# Patient Record
Sex: FEMALE | Race: WHITE | NOT HISPANIC OR LATINO | Employment: FULL TIME | URBAN - METROPOLITAN AREA
[De-identification: names, ages, dates, MRNs, and addresses within clinical notes are randomized per-mention and may not be internally consistent; named-entity substitution may affect disease eponyms.]

---

## 2017-04-07 ENCOUNTER — HOSPITAL ENCOUNTER (OUTPATIENT)
Dept: RADIOLOGY | Facility: HOSPITAL | Age: 37
Discharge: HOME | End: 2017-04-07
Attending: FAMILY MEDICINE
Payer: COMMERCIAL

## 2017-04-07 ENCOUNTER — OFFICE VISIT (OUTPATIENT)
Dept: FAMILY MEDICINE | Facility: CLINIC | Age: 37
End: 2017-04-07
Payer: COMMERCIAL

## 2017-04-07 VITALS
HEART RATE: 77 BPM | WEIGHT: 199.5 LBS | BODY MASS INDEX: 35.35 KG/M2 | TEMPERATURE: 98 F | OXYGEN SATURATION: 98 % | DIASTOLIC BLOOD PRESSURE: 80 MMHG | SYSTOLIC BLOOD PRESSURE: 100 MMHG | HEIGHT: 63 IN

## 2017-04-07 DIAGNOSIS — Z00.00 GENERAL MEDICAL EXAMINATION: ICD-10-CM

## 2017-04-07 DIAGNOSIS — R10.9 LEFT FLANK PAIN: Primary | ICD-10-CM

## 2017-04-07 DIAGNOSIS — R10.9 LEFT FLANK PAIN: ICD-10-CM

## 2017-04-07 LAB
BILIRUB SERPL-MCNC: NORMAL MG/DL
BLOOD URINE, POC: NORMAL
COLOR, POC UA: YELLOW
GLUCOSE UR QL STRIP: NORMAL
KETONES UR QL STRIP: NORMAL
LEUKOCYTE ESTERASE URINE, POC: NORMAL
NITRITE, POC UA: NORMAL
PH, POC UA: 7
PROTEIN, POC: NORMAL
SPECIFIC GRAVITY, POC UA: 1.01
UROBILINOGEN, POC UA: NORMAL

## 2017-04-07 PROCEDURE — 87088 URINE BACTERIA CULTURE: CPT

## 2017-04-07 PROCEDURE — 74020 XR ABDOMEN FLAT AND ERECT: CPT | Mod: TC,PO

## 2017-04-07 PROCEDURE — 81002 URINALYSIS NONAUTO W/O SCOPE: CPT | Mod: S$GLB,,, | Performed by: FAMILY MEDICINE

## 2017-04-07 PROCEDURE — 99204 OFFICE O/P NEW MOD 45 MIN: CPT | Mod: 25,S$GLB,, | Performed by: FAMILY MEDICINE

## 2017-04-07 PROCEDURE — 87147 CULTURE TYPE IMMUNOLOGIC: CPT

## 2017-04-07 PROCEDURE — 1160F RVW MEDS BY RX/DR IN RCRD: CPT | Mod: S$GLB,,, | Performed by: FAMILY MEDICINE

## 2017-04-07 PROCEDURE — 87086 URINE CULTURE/COLONY COUNT: CPT

## 2017-04-07 PROCEDURE — 99999 PR PBB SHADOW E&M-EST. PATIENT-LVL III: CPT | Mod: PBBFAC,,, | Performed by: FAMILY MEDICINE

## 2017-04-07 PROCEDURE — 74020 XR ABDOMEN FLAT AND ERECT: CPT | Mod: 26,,, | Performed by: RADIOLOGY

## 2017-04-07 RX ORDER — CIPROFLOXACIN 500 MG/1
500 TABLET ORAL EVERY 12 HOURS
Qty: 20 TABLET | Refills: 0 | Status: SHIPPED | OUTPATIENT
Start: 2017-04-07 | End: 2021-08-16

## 2017-04-07 NOTE — MR AVS SNAPSHOT
[facility]  [address]  [place] [address]  Phone: [phone]  Fax: [phone]                  Dede Silver   2017 2:15 PM   Office Visit    Description:  Female : 1980   Provider:  Rosetta Ellis MD   Department:  [facility]           Reason for Visit     Back Pain           Diagnoses this Visit        Comments    Left flank pain    -  Primary     General medical examination                To Do List           Future Appointments        Provider Department Dept Phone    4/10/2017 10:00 AM LAB, [facility] [phone]    2017 2:30 PM Rosetta Ellis MD [facility] [phone]      Goals (5 Years of Data)     None       These Medications        Disp Refills Start End    ciprofloxacin HCl (CIPRO) 500 MG tablet 20 tablet 0 2017     Take 1 tablet (500 mg total) by mouth every 12 (twelve) hours. - Oral    [facility] [address] [place] [address] [place] [address] AT [facility] #: [phone]         [facility]     [facility] -  Assistance  Unless otherwise directed by your provider, please contact [facility], our nurse care line that is available for  assistance.     Registered nurses in the [facility] provide: appointment scheduling, clinical advisement, health education, and other advisory services.  Call: [phone] (toll free)               Medications           Message regarding Medications     Verify the changes and/or additions to your medication regime listed below are the same as discussed with your clinician today.  If any of these changes or additions are incorrect, please notify your healthcare provider.        START taking these NEW medications        Refills    ciprofloxacin HCl (CIPRO) 500 MG tablet 0    Sig: Take 1 tablet (500 mg total) by mouth every 12 (twelve) hours.    Class: Normal    Route: Oral          "  Verify that the below list of medications is an accurate representation of the medications you are currently taking.  If none reported, the list may be blank. If incorrect, please contact your healthcare provider. Carry this list with you in case of emergency.           Current Medications     ciprofloxacin HCl (CIPRO) 500 MG tablet Take 1 tablet (500 mg total) by mouth every 12 (twelve) hours.           Clinical Reference Information           Your Vitals Were     BP Pulse Temp Height Weight Last Period    100/80 (BP Location: Right arm, Patient Position: Sitting, BP Method: Manual) 77 98 °F (36.7 °C) (Oral) 5' 3" (1.6 m) 90.5 kg (199 lb 8.3 oz) 03/22/2017    SpO2 BMI             98% 35.34 kg/m2         Blood Pressure          Most Recent Value    BP  100/80      Allergies as of 4/7/2017     No Known Allergies      Immunizations Administered on Date of Encounter - 4/7/2017     None      Orders Placed During Today's Visit      Normal Orders This Visit    POCT URINE DIPSTICK WITHOUT MICROSCOPE     Urine culture     Future Labs/Procedures Expected by Expires    CBC auto differential  4/7/2017 4/7/2018    Comprehensive metabolic panel  4/7/2017 4/7/2018    Lipid panel  4/7/2017 4/7/2018    TSH  4/7/2017 4/7/2018    Vitamin D  4/7/2017 4/7/2018    X-Ray Abdomen Flat And Erect  4/7/2017 4/7/2018      Language Assistance Services     ATTENTION: Language assistance services are available, free of charge. Please call [phone].      ATENCIÓN: Si habla español, tiene a montez disposición servicios gratuitos de asistencia lingüística. Llame al [phone].     CHÚ Ý: N?u b?n nói Ti?ng Vi?t, có các d?ch v? h? tr? ngôn ng? mi?n phí dành cho b?n. G?i s? [phone].         [facility] complies with applicable Federal civil rights laws and does not discriminate on the basis of race, color, national origin, age, disability, or sex.        "

## 2017-04-09 LAB — BACTERIA UR CULT: NORMAL

## 2017-04-11 ENCOUNTER — LAB VISIT (OUTPATIENT)
Dept: LAB | Facility: HOSPITAL | Age: 37
End: 2017-04-11
Attending: FAMILY MEDICINE
Payer: COMMERCIAL

## 2017-04-11 DIAGNOSIS — Z00.00 GENERAL MEDICAL EXAMINATION: ICD-10-CM

## 2017-04-11 LAB
25(OH)D3+25(OH)D2 SERPL-MCNC: 26 NG/ML
ALBUMIN SERPL BCP-MCNC: 3.9 G/DL
ALP SERPL-CCNC: 61 U/L
ALT SERPL W/O P-5'-P-CCNC: 12 U/L
ANION GAP SERPL CALC-SCNC: 9 MMOL/L
AST SERPL-CCNC: 10 U/L
BASOPHILS # BLD AUTO: 0.02 K/UL
BASOPHILS NFR BLD: 0.3 %
BILIRUB SERPL-MCNC: 0.3 MG/DL
BUN SERPL-MCNC: 11 MG/DL
CALCIUM SERPL-MCNC: 9.3 MG/DL
CHLORIDE SERPL-SCNC: 104 MMOL/L
CHOLEST/HDLC SERPL: 5 {RATIO}
CO2 SERPL-SCNC: 23 MMOL/L
CREAT SERPL-MCNC: 0.8 MG/DL
DIFFERENTIAL METHOD: NORMAL
EOSINOPHIL # BLD AUTO: 0.4 K/UL
EOSINOPHIL NFR BLD: 6.5 %
ERYTHROCYTE [DISTWIDTH] IN BLOOD BY AUTOMATED COUNT: 12.8 %
EST. GFR  (AFRICAN AMERICAN): >60 ML/MIN/1.73 M^2
EST. GFR  (NON AFRICAN AMERICAN): >60 ML/MIN/1.73 M^2
GLUCOSE SERPL-MCNC: 113 MG/DL
HCT VFR BLD AUTO: 40.3 %
HDL/CHOLESTEROL RATIO: 19.9 %
HDLC SERPL-MCNC: 206 MG/DL
HDLC SERPL-MCNC: 41 MG/DL
HGB BLD-MCNC: 13.8 G/DL
LDLC SERPL CALC-MCNC: 140.2 MG/DL
LYMPHOCYTES # BLD AUTO: 1.7 K/UL
LYMPHOCYTES NFR BLD: 25.8 %
MCH RBC QN AUTO: 30.9 PG
MCHC RBC AUTO-ENTMCNC: 34.2 %
MCV RBC AUTO: 90 FL
MONOCYTES # BLD AUTO: 0.4 K/UL
MONOCYTES NFR BLD: 5.6 %
NEUTROPHILS # BLD AUTO: 4 K/UL
NEUTROPHILS NFR BLD: 61.5 %
NONHDLC SERPL-MCNC: 165 MG/DL
PLATELET # BLD AUTO: 194 K/UL
PMV BLD AUTO: 10.8 FL
POTASSIUM SERPL-SCNC: 4.3 MMOL/L
PROT SERPL-MCNC: 7.5 G/DL
RBC # BLD AUTO: 4.47 M/UL
SODIUM SERPL-SCNC: 136 MMOL/L
TRIGL SERPL-MCNC: 124 MG/DL
TSH SERPL DL<=0.005 MIU/L-ACNC: 0.86 UIU/ML
WBC # BLD AUTO: 6.44 K/UL

## 2017-04-11 PROCEDURE — 36415 COLL VENOUS BLD VENIPUNCTURE: CPT | Mod: PO

## 2017-04-11 PROCEDURE — 80061 LIPID PANEL: CPT

## 2017-04-11 PROCEDURE — 82306 VITAMIN D 25 HYDROXY: CPT

## 2017-04-11 PROCEDURE — 84443 ASSAY THYROID STIM HORMONE: CPT

## 2017-04-11 PROCEDURE — 80053 COMPREHEN METABOLIC PANEL: CPT

## 2017-04-11 PROCEDURE — 85025 COMPLETE CBC W/AUTO DIFF WBC: CPT

## 2017-04-21 ENCOUNTER — OFFICE VISIT (OUTPATIENT)
Dept: FAMILY MEDICINE | Facility: CLINIC | Age: 37
End: 2017-04-21
Payer: COMMERCIAL

## 2017-04-21 VITALS
OXYGEN SATURATION: 98 % | HEART RATE: 82 BPM | DIASTOLIC BLOOD PRESSURE: 80 MMHG | BODY MASS INDEX: 34.91 KG/M2 | SYSTOLIC BLOOD PRESSURE: 100 MMHG | HEIGHT: 63 IN | WEIGHT: 197.06 LBS | TEMPERATURE: 99 F

## 2017-04-21 DIAGNOSIS — E55.9 VITAMIN D DEFICIENCY: ICD-10-CM

## 2017-04-21 DIAGNOSIS — N39.0 URINARY TRACT INFECTION WITHOUT HEMATURIA, SITE UNSPECIFIED: ICD-10-CM

## 2017-04-21 DIAGNOSIS — Z00.00 ANNUAL PHYSICAL EXAM: Primary | ICD-10-CM

## 2017-04-21 DIAGNOSIS — R10.9 LEFT FLANK PAIN: ICD-10-CM

## 2017-04-21 PROCEDURE — 96372 THER/PROPH/DIAG INJ SC/IM: CPT | Mod: S$GLB,,, | Performed by: FAMILY MEDICINE

## 2017-04-21 PROCEDURE — 99999 PR PBB SHADOW E&M-EST. PATIENT-LVL IV: CPT | Mod: PBBFAC,,, | Performed by: FAMILY MEDICINE

## 2017-04-21 PROCEDURE — 87086 URINE CULTURE/COLONY COUNT: CPT

## 2017-04-21 PROCEDURE — 99395 PREV VISIT EST AGE 18-39: CPT | Mod: 25,S$GLB,, | Performed by: FAMILY MEDICINE

## 2017-04-21 RX ORDER — ERGOCALCIFEROL 1.25 MG/1
50000 CAPSULE ORAL
Qty: 4 CAPSULE | Refills: 2 | Status: SHIPPED | OUTPATIENT
Start: 2017-04-21 | End: 2017-05-21

## 2017-04-21 RX ORDER — PENICILLIN V POTASSIUM 500 MG/1
500 TABLET, FILM COATED ORAL EVERY 8 HOURS
Qty: 15 TABLET | Refills: 0 | Status: SHIPPED | OUTPATIENT
Start: 2017-04-21 | End: 2017-04-26

## 2017-04-21 NOTE — PATIENT INSTRUCTIONS
Understanding Urinary Tract Infections (UTIs)  Most UTIs are caused by bacteria, although they may also be caused by viruses or fungi. Bacteria from the bowel are the most common source of infection. The infection may begin because of any of the following:  · Sexual activity. During sex, germs can travel from the penis, vagina, or rectum into the urethra.   · Germs on the skin outside the rectum may travel into the urethra. This is more common in women since the rectum and urethra are closer to each other than in men. Wiping from front to back after using the toilet and keeping the area clean can help prevent germs from getting to the urethra.  · Blockage of urine flow through the urinary tract. If urine sits too long, germs may begin to grow out of control.      Parts of the urinary tract  The infection can occur in any part of the urinary tract.  · The kidneys collect and store urine.  · The ureters carry urine from the kidneys to the bladder.  · The bladder holds urine until you are ready to let it out.  · The urethra carries urine from the bladder out of the body. It is shorter in women, so bacteria can move through it more easily. The urethra is longer in men, so a UTI is less likely to reach the bladder or kidneys in men.  Date Last Reviewed: 9/8/2014  © 8268-3988 [employer]. [address] [place] [address], [place] [address]. All rights reserved. This information is not intended as a substitute for professional medical care. Always follow your healthcare professional's instructions.

## 2017-04-21 NOTE — PROGRESS NOTES
Patient tolerated injection well.  Instructed to remain in lobby for 15 minutes and to report any adverse reactions right away.   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

## 2017-04-21 NOTE — MR AVS SNAPSHOT
[facility]  [address] [facility]  [place] [address]  Phone: [phone]  Fax: [phone]                  Dede Silver   2017 2:30 PM   Office Visit    Description:  Female : 1980   Provider:  Rosetta Ellis MD   Department:  [facility]           Reason for Visit     Results     Annual Exam           Diagnoses this Visit        Comments    Left flank pain    -  Primary     Urinary tract infection without hematuria, site unspecified                To Do List           Goals (5 Years of Data)     None       These Medications        Disp Refills Start End    ergocalciferol (ERGOCALCIFEROL) 50,000 unit Cap 4 capsule 2 2017    Take 1 capsule (50,000 Units total) by mouth every 7 days. - Oral    Pharmacy: [facility] [address] [place] [address] AT [facility] #: [phone]       penicillin v potassium (VEETID) 500 MG tablet 15 tablet 0 2017    Take 1 tablet (500 mg total) by mouth every 8 (eight) hours. - Oral    Pharmacy: [facility] [address] [place] [address] AT [facility] #: [phone]         [facility]     [facility] - 24/ Assistance  Unless otherwise directed by your provider, please contact [facility], our nurse care line that is available for 24/ assistance.     Registered nurses in the [facility] provide: appointment scheduling, clinical advisement, health education, and other advisory services.  Call: [phone] (toll free)               Medications           Message regarding Medications     Verify the changes and/or additions to your medication regime listed below are the same as discussed with your clinician today.  If any of these changes or additions are incorrect, please notify your healthcare provider.        START taking these NEW medications        Refills    ergocalciferol (ERGOCALCIFEROL) 50,000 unit  "Cap 2    Sig: Take 1 capsule (50,000 Units total) by mouth every 7 days.    Class: Normal    Route: Oral    penicillin v potassium (VEETID) 500 MG tablet 0    Sig: Take 1 tablet (500 mg total) by mouth every 8 (eight) hours.    Class: Normal    Route: Oral      These medications were administered today        Dose Freq    penicillin G benzathine (BICILLIN LA) injection 1.2 Million Units 1.2 Million Units Clinic/HOD 1 time    Sig: Inject 2 mLs (1.2 Million Units total) into the muscle one time.    Class: Normal    Route: Intramuscular           Verify that the below list of medications is an accurate representation of the medications you are currently taking.  If none reported, the list may be blank. If incorrect, please contact your healthcare provider. Carry this list with you in case of emergency.           Current Medications     ciprofloxacin HCl (CIPRO) 500 MG tablet Take 1 tablet (500 mg total) by mouth every 12 (twelve) hours.    ergocalciferol (ERGOCALCIFEROL) 50,000 unit Cap Take 1 capsule (50,000 Units total) by mouth every 7 days.    penicillin v potassium (VEETID) 500 MG tablet Take 1 tablet (500 mg total) by mouth every 8 (eight) hours.           Clinical Reference Information           Your Vitals Were     BP Pulse Temp Height Weight Last Period    100/80 (BP Location: Right arm, Patient Position: Sitting, BP Method: Manual) 82 98.5 °F (36.9 °C) (Oral) 5' 3" (1.6 m) 89.4 kg (197 lb 1.5 oz) 03/22/2017    SpO2 BMI             98% 34.91 kg/m2         Blood Pressure          Most Recent Value    BP  100/80      Allergies as of 4/21/2017     No Known Allergies      Immunizations Administered on Date of Encounter - 4/21/2017     None      Orders Placed During Today's Visit      Normal Orders This Visit    CULTURE, URINE     Future Labs/Procedures Expected by Expires    US Retroperitoneal Complete (Kidney and  4/21/2017 4/21/2018      Instructions      Understanding Urinary Tract Infections (UTIs)  Most UTIs " are caused by bacteria, although they may also be caused by viruses or fungi. Bacteria from the bowel are the most common source of infection. The infection may begin because of any of the following:  · Sexual activity. During sex, germs can travel from the penis, vagina, or rectum into the urethra.   · Germs on the skin outside the rectum may travel into the urethra. This is more common in women since the rectum and urethra are closer to each other than in men. Wiping from front to back after using the toilet and keeping the area clean can help prevent germs from getting to the urethra.  · Blockage of urine flow through the urinary tract. If urine sits too long, germs may begin to grow out of control.      Parts of the urinary tract  The infection can occur in any part of the urinary tract.  · The kidneys collect and store urine.  · The ureters carry urine from the kidneys to the bladder.  · The bladder holds urine until you are ready to let it out.  · The urethra carries urine from the bladder out of the body. It is shorter in women, so bacteria can move through it more easily. The urethra is longer in men, so a UTI is less likely to reach the bladder or kidneys in men.  Date Last Reviewed: 9/8/2014  © 9261-2967 [employer]. [address] [place] [address], [place] [address]. All rights reserved. This information is not intended as a substitute for professional medical care. Always follow your healthcare professional's instructions.             Language Assistance Services     ATTENTION: Language assistance services are available, free of charge. Please call [phone].      ATENCIÓN: Si habla español, tiene a montez disposición servicios gratuitos de asistencia lingüística. Llame al [phone].     [facility] Ý: N?u b?n nói Ti?ng Vi?t, có các d?ch v? h? tr? ngôn ng? mi?n phí dành cho b?n. G?i s? [phone].         [facility] complies with applicable Federal civil rights laws and does not  discriminate on the basis of race, color, national origin, age, disability, or sex.

## 2017-04-22 LAB — BACTERIA UR CULT: NO GROWTH

## 2017-04-23 NOTE — PROGRESS NOTES
Routine Office Visit    Patient Name: Dede Silver    : 1980  MRN: [id number]    Subjective:  Dede is a 36 y.o. female who presents today for     1. New patient / establish care   2. Left flank pain - started 2 weeks ago - pain occurs in her left flank whenever she urinates. She states that whenever she urinates, she feels that she has an uncomfortable sensation and painful sensation in her left flank pain. 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 She prescribed herself bactrim x 3 days and completed course of antibiotic. She has increase her fluid intake but symptoms have not improved.     Review of Systems   Constitutional: Negative for chills and fever.   HENT: Negative for congestion.    Eyes: Negative for blurred vision.   Respiratory: Negative for cough.    Cardiovascular: Negative for chest pain.   Gastrointestinal: Negative for abdominal pain, constipation, diarrhea, heartburn, nausea and vomiting.   Genitourinary: Negative for dysuria.   Musculoskeletal: Negative for myalgias.   Skin: Negative for itching and rash.   Neurological: Negative for dizziness and headaches.   Psychiatric/Behavioral: Negative for depression.       Active Problem List  There is no problem list on file for this patient.      Past Surgical History  History reviewed. No pertinent surgical history.    Family History  History reviewed. No pertinent family history.    Social History  Social History     Social History    Marital status:      Spouse name: N/A    Number of children: N/A    Years of education: N/A     Occupational History    Not on file.     Social History Main Topics    Smoking status: Never Smoker    Smokeless tobacco: Not on file    Alcohol use No    Drug use: Not on file    Sexual activity: Not on file     Other Topics Concern    Not on file     Social History Narrative       Medications and Allergies  Reviewed and updated.   Current Outpatient Prescriptions   Medication Sig     "ciprofloxacin HCl (CIPRO) 500 MG tablet Take 1 tablet (500 mg total) by mouth every 12 (twelve) hours.    ergocalciferol (ERGOCALCIFEROL) 50,000 unit Cap Take 1 capsule (50,000 Units total) by mouth every 7 days.    penicillin v potassium (VEETID) 500 MG tablet Take 1 tablet (500 mg total) by mouth every 8 (eight) hours.     No current facility-administered medications for this visit.        Physical Exam  /80 (BP Location: Right arm, Patient Position: Sitting, BP Method: Manual)  Pulse 77  Temp 98 °F (36.7 °C) (Oral)   Ht 5' 3" (1.6 m)  Wt 90.5 kg (199 lb 8.3 oz)  LMP 03/22/2017  SpO2 98%  BMI 35.34 kg/m2  Physical Exam   Constitutional: She is oriented to person, place, and time. She appears well-developed and well-nourished.   HENT:   Head: Normocephalic and atraumatic.   Eyes: Conjunctivae and EOM are normal. Pupils are equal, round, and reactive to light.   Neck: Normal range of motion. Neck supple.   Cardiovascular: Normal rate, regular rhythm and normal heart sounds.  Exam reveals no gallop and no friction rub.    No murmur heard.  Pulmonary/Chest: Breath sounds normal. No respiratory distress.   Abdominal: Soft. Bowel sounds are normal. She exhibits no distension. There is no tenderness.   Musculoskeletal: Normal range of motion.   Lymphadenopathy:     She has no cervical adenopathy.   Neurological: She is alert and oriented to person, place, and time.   Skin: Skin is warm.   Psychiatric: She has a normal mood and affect.         Assessment/Plan:  Dede Silver is a 36 y.o. female who presents today for :    Left flank pain  -     POCT URINE DIPSTICK WITHOUT MICROSCOPE  -     Urine culture  -     X-Ray Abdomen Flat And Erect; Future; Expected date: 4/7/17  -     ciprofloxacin HCl (CIPRO) 500 MG tablet; Take 1 tablet (500 mg total) by mouth every 12 (twelve) hours.  Dispense: 20 tablet; Refill: 0    General medical examination  -     CBC auto differential; Future; Expected date: 4/7/17  -     " Comprehensive metabolic panel; Future; Expected date: 4/7/17  -     Lipid panel; Future; Expected date: 4/7/17  -     Vitamin D; Future; Expected date: 4/7/17  -     TSH; Future; Expected date: 4/7/17        Return if symptoms worsen or fail to improve.

## 2017-04-23 NOTE — PROGRESS NOTES
Routine Office Visit    Patient Name: Dede Silver    : 1980  MRN: [id number]    Subjective:  Dede is a 36 y.o. female who presents today for     1. Annual visit   2. Left flank pain - still present since last visit. Pt states symptoms started 5 weeks ago. 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 She states that she only feels the pain whenever she urinates. She says it feels like the urine is refluxing up.     Review of Systems   Constitutional: Negative for chills and fever.   HENT: Negative for congestion.    Eyes: Negative for blurred vision.   Respiratory: Negative for cough.    Cardiovascular: Negative for chest pain.   Gastrointestinal: Negative for abdominal pain, constipation, diarrhea, heartburn, nausea and vomiting.   Genitourinary: Negative for dysuria.   Musculoskeletal: Negative for myalgias.   Skin: Negative for itching and rash.   Neurological: Negative for dizziness and headaches.   Psychiatric/Behavioral: Negative for depression.       Active Problem List  There is no problem list on file for this patient.      Past Surgical History  History reviewed. No pertinent surgical history.    Family History  History reviewed. No pertinent family history.    Social History  Social History     Social History    Marital status:      Spouse name: N/A    Number of children: N/A    Years of education: N/A     Occupational History    Not on file.     Social History Main Topics    Smoking status: Never Smoker    Smokeless tobacco: Not on file    Alcohol use No    Drug use: Not on file    Sexual activity: Not on file     Other Topics Concern    Not on file     Social History Narrative       Medications and Allergies  Reviewed and updated.   Current Outpatient Prescriptions   Medication Sig    ciprofloxacin HCl (CIPRO) 500 MG tablet Take 1 tablet (500 mg total) by mouth every 12 (twelve) hours.    ergocalciferol  "(ERGOCALCIFEROL) 50,000 unit Cap Take 1 capsule (50,000 Units total) by mouth every 7 days.    penicillin v potassium (VEETID) 500 MG tablet Take 1 tablet (500 mg total) by mouth every 8 (eight) hours.     No current facility-administered medications for this visit.        Physical Exam  /80 (BP Location: Right arm, Patient Position: Sitting, BP Method: Manual)  Pulse 82  Temp 98.5 °F (36.9 °C) (Oral)   Ht 5' 3" (1.6 m)  Wt 89.4 kg (197 lb 1.5 oz)  LMP 03/22/2017  SpO2 98%  BMI 34.91 kg/m2  Physical Exam   Constitutional: She is oriented to person, place, and time. She appears well-developed and well-nourished.   HENT:   Head: Normocephalic and atraumatic.   Eyes: Conjunctivae and EOM are normal. Pupils are equal, round, and reactive to light.   Neck: Normal range of motion. Neck supple.   Cardiovascular: Normal rate, regular rhythm and normal heart sounds.  Exam reveals no gallop and no friction rub.    No murmur heard.  Pulmonary/Chest: Breath sounds normal. No respiratory distress.   Abdominal: Soft. Bowel sounds are normal. She exhibits no distension. There is no tenderness.   Musculoskeletal: Normal range of motion.   Lymphadenopathy:     She has no cervical adenopathy.   Neurological: She is alert and oriented to person, place, and time.   Skin: Skin is warm.   Psychiatric: She has a normal mood and affect.         Assessment/Plan:  Dede Silver is a 36 y.o. female who presents today for :    Annual physical exam  I reviewed labs with patient     Left flank pain  -     US Retroperitoneal Complete (Kidney and; Future; Expected date: 4/21/17  -     CULTURE, URINE  -     Ambulatory referral to Urology    Urinary tract infection without hematuria, site unspecified  -     penicillin G benzathine (BICILLIN LA) injection 1.2 Million Units; Inject 2 mLs (1.2 Million Units total) into the muscle one time.  -     penicillin v potassium (VEETID) 500 MG tablet; Take 1 tablet (500 mg total) by mouth every 8 " (eight) hours.  Dispense: 15 tablet; Refill: 0  Previous culture showed strep agalactiae - pt still with pain - will treat   Will repeat culture      Vitamin D deficiency  -     ergocalciferol (ERGOCALCIFEROL) 50,000 unit Cap; Take 1 capsule (50,000 Units total) by mouth every 7 days.  Dispense: 4 capsule; Refill: 2        Return if symptoms worsen or fail to improve.

## 2017-04-25 ENCOUNTER — TELEPHONE (OUTPATIENT)
Dept: FAMILY MEDICINE | Facility: CLINIC | Age: 37
End: 2017-04-25

## 2017-04-25 NOTE — TELEPHONE ENCOUNTER
Patient has an appointment 5/9/17 1:30pm with Dr. Haji at the [facility], patient was notified.

## 2018-07-16 ENCOUNTER — OFFICE VISIT (OUTPATIENT)
Dept: DERMATOLOGY | Facility: CLINIC | Age: 38
End: 2018-07-16
Payer: COMMERCIAL

## 2018-07-16 DIAGNOSIS — D18.00 ANGIOMA: Primary | ICD-10-CM

## 2018-07-16 PROCEDURE — 99999 PR PBB SHADOW E&M-EST. PATIENT-LVL II: CPT | Mod: PBBFAC,,, | Performed by: DERMATOLOGY

## 2018-07-16 PROCEDURE — 99201 PR OFFICE/OUTPT VISIT,NEW,LEVL I: CPT | Mod: S$GLB,,, | Performed by: DERMATOLOGY

## 2018-07-16 NOTE — PROGRESS NOTES
Subjective:       Patient ID:  Dede Silver is a 37 y.o. female who presents for   Chief Complaint   Patient presents with    Mole     neck      Mole  - Initial  Affected locations: neck  Duration: 1 year  Signs / symptoms: growing  Aggravated by: nothing  Relieving factors/Treatments tried: nothing        Review of Systems   Hematologic/Lymphatic: Does not bruise/bleed easily.        Objective:    Physical Exam   Constitutional: She appears well-developed and well-nourished. No distress.   Neurological: She is alert and oriented to person, place, and time. She is not disoriented.   Psychiatric: She has a normal mood and affect.   Skin:   Areas Examined (abnormalities noted in diagram):   Head / Face Inspection Performed  Neck Inspection Performed              Diagram Legend     Erythematous scaling macule/papule c/w actinic keratosis       Vascular papule c/w angioma      Pigmented verrucoid papule/plaque c/w seborrheic keratosis      Yellow umbilicated papule c/w sebaceous hyperplasia      Irregularly shaped tan macule c/w lentigo     1-2 mm smooth white papules consistent with Milia      Movable subcutaneous cyst with punctum c/w epidermal inclusion cyst      Subcutaneous movable cyst c/w pilar cyst      Firm pink to brown papule c/w dermatofibroma      Pedunculated fleshy papule(s) c/w skin tag(s)      Evenly pigmented macule c/w junctional nevus     Mildly variegated pigmented, slightly irregular-bordered macule c/w mildly atypical nevus      Flesh colored to evenly pigmented papule c/w intradermal nevus       Pink pearly papule/plaque c/w basal cell carcinoma      Erythematous hyperkeratotic cursted plaque c/w SCC      Surgical scar with no sign of skin cancer recurrence      Open and closed comedones      Inflammatory papules and pustules      Verrucoid papule consistent consistent with wart     Erythematous eczematous patches and plaques     Dystrophic onycholytic nail with subungual debris c/w  onychomycosis     Umbilicated papule    Erythematous-base heme-crusted tan verrucoid plaque consistent with inflamed seborrheic keratosis     Erythematous Silvery Scaling Plaque c/w Psoriasis     See annotation      Assessment / Plan:        Angioma  Reassurance given to patient. No treatment is necessary.   Treatment of benign, asymptomatic lesions may be considered cosmetic.    Discussed ABCDE's of nevi.  Monitor for new mole or moles that are becoming bigger, darker, irritated, or developing irregular borders.                Follow-up if symptoms worsen or fail to improve.

## 2021-08-06 ENCOUNTER — TELEPHONE (OUTPATIENT)
Dept: NEUROLOGY | Facility: CLINIC | Age: 41
End: 2021-08-06

## 2021-08-13 ENCOUNTER — TELEPHONE (OUTPATIENT)
Dept: NEUROLOGY | Facility: CLINIC | Age: 41
End: 2021-08-13

## 2021-08-16 ENCOUNTER — DOCUMENTATION ONLY (OUTPATIENT)
Dept: NEUROLOGY | Facility: CLINIC | Age: 41
End: 2021-08-16

## 2021-08-16 ENCOUNTER — OFFICE VISIT (OUTPATIENT)
Dept: NEUROLOGY | Facility: CLINIC | Age: 41
End: 2021-08-16
Payer: COMMERCIAL

## 2021-08-16 VITALS
WEIGHT: 227.19 LBS | HEART RATE: 75 BPM | DIASTOLIC BLOOD PRESSURE: 90 MMHG | BODY MASS INDEX: 40.25 KG/M2 | SYSTOLIC BLOOD PRESSURE: 129 MMHG | HEIGHT: 63 IN

## 2021-08-16 DIAGNOSIS — D47.2 MGUS (MONOCLONAL GAMMOPATHY OF UNKNOWN SIGNIFICANCE): ICD-10-CM

## 2021-08-16 DIAGNOSIS — M25.60 MORNING STIFFNESS OF JOINTS: ICD-10-CM

## 2021-08-16 DIAGNOSIS — G62.9 POLYNEUROPATHY: Primary | ICD-10-CM

## 2021-08-16 PROCEDURE — 99205 OFFICE O/P NEW HI 60 MIN: CPT | Mod: S$PBB,,, | Performed by: STUDENT IN AN ORGANIZED HEALTH CARE EDUCATION/TRAINING PROGRAM

## 2021-08-16 PROCEDURE — 99205 PR OFFICE/OUTPT VISIT, NEW, LEVL V, 60-74 MIN: ICD-10-PCS | Mod: S$PBB,,, | Performed by: STUDENT IN AN ORGANIZED HEALTH CARE EDUCATION/TRAINING PROGRAM

## 2021-08-16 PROCEDURE — 99999 PR PBB SHADOW E&M-EST. PATIENT-LVL III: ICD-10-PCS | Mod: PBBFAC,,, | Performed by: STUDENT IN AN ORGANIZED HEALTH CARE EDUCATION/TRAINING PROGRAM

## 2021-08-16 PROCEDURE — 99999 PR PBB SHADOW E&M-EST. PATIENT-LVL III: CPT | Mod: PBBFAC,,, | Performed by: STUDENT IN AN ORGANIZED HEALTH CARE EDUCATION/TRAINING PROGRAM

## 2021-08-16 RX ORDER — IBUPROFEN 200 MG
200 TABLET ORAL
COMMUNITY
End: 2021-10-11 | Stop reason: ALTCHOICE

## 2021-08-16 RX ORDER — ASCORBIC ACID, CHOLECALCIFEROL, DL-.ALPHA.-TOCOPHEROL ACETATE, THIAMINE HYDROCHLORIDE, RIBOFLAVIN, NIACINAMIDE, PYRIDOXINE HYDROCHLORIDE, FOLIC ACID, CYANOCOBALAMIN, CALCIUM CARBONATE, FERROUS FUMARATE, ZINC OXIDE, CUPRIC SULFATE 100; 400; 30; 1.6; 1.6; 20; 3.1; 1; 12; 200; 27; 10; 2 MG/1; [IU]/1; [IU]/1; MG/1; MG/1; MG/1; MG/1; MG/1; UG/1; MG/1; MG/1; MG/1; MG/1
1 TABLET, COATED ORAL DAILY
COMMUNITY
Start: 2021-03-30 | End: 2022-08-30

## 2021-08-20 ENCOUNTER — PATIENT MESSAGE (OUTPATIENT)
Dept: NEUROLOGY | Facility: CLINIC | Age: 41
End: 2021-08-20

## 2021-08-26 ENCOUNTER — TELEPHONE (OUTPATIENT)
Dept: NEUROLOGY | Facility: CLINIC | Age: 41
End: 2021-08-26

## 2021-08-26 NOTE — TELEPHONE ENCOUNTER
----- Message from Roseanna Garza sent at 8/26/2021 10:03 AM CDT -----  Contact: self @ [phone]  Pt says she is returning Brunilda's call concerning an appt.

## 2021-09-08 ENCOUNTER — TELEPHONE (OUTPATIENT)
Dept: RHEUMATOLOGY | Facility: CLINIC | Age: 41
End: 2021-09-08

## 2021-09-14 ENCOUNTER — TELEPHONE (OUTPATIENT)
Dept: NEUROLOGY | Facility: CLINIC | Age: 41
End: 2021-09-14

## 2021-09-27 ENCOUNTER — PROCEDURE VISIT (OUTPATIENT)
Dept: NEUROLOGY | Facility: CLINIC | Age: 41
End: 2021-09-27
Payer: COMMERCIAL

## 2021-09-27 ENCOUNTER — LAB VISIT (OUTPATIENT)
Dept: LAB | Facility: OTHER | Age: 41
End: 2021-09-27
Attending: STUDENT IN AN ORGANIZED HEALTH CARE EDUCATION/TRAINING PROGRAM
Payer: COMMERCIAL

## 2021-09-27 DIAGNOSIS — G62.9 POLYNEUROPATHY: ICD-10-CM

## 2021-09-27 DIAGNOSIS — D47.2 MGUS (MONOCLONAL GAMMOPATHY OF UNKNOWN SIGNIFICANCE): ICD-10-CM

## 2021-09-27 LAB
CCP AB SER IA-ACNC: <0.5 U/ML
ESTIMATED AVG GLUCOSE: 111 MG/DL (ref 68–131)
HBA1C MFR BLD: 5.5 % (ref 4–5.6)
RHEUMATOID FACT SERPL-ACNC: <13 IU/ML (ref 0–15)

## 2021-09-27 PROCEDURE — 86431 RHEUMATOID FACTOR QUANT: CPT | Performed by: STUDENT IN AN ORGANIZED HEALTH CARE EDUCATION/TRAINING PROGRAM

## 2021-09-27 PROCEDURE — 86225 DNA ANTIBODY NATIVE: CPT | Performed by: STUDENT IN AN ORGANIZED HEALTH CARE EDUCATION/TRAINING PROGRAM

## 2021-09-27 PROCEDURE — 95913 PR NERVE CONDUCTION STUDY; 13 OR MORE STUDIES: ICD-10-PCS | Mod: S$GLB,,, | Performed by: PSYCHIATRY & NEUROLOGY

## 2021-09-27 PROCEDURE — 86618 LYME DISEASE ANTIBODY: CPT | Performed by: STUDENT IN AN ORGANIZED HEALTH CARE EDUCATION/TRAINING PROGRAM

## 2021-09-27 PROCEDURE — 86147 CARDIOLIPIN ANTIBODY EA IG: CPT | Performed by: STUDENT IN AN ORGANIZED HEALTH CARE EDUCATION/TRAINING PROGRAM

## 2021-09-27 PROCEDURE — 86235 NUCLEAR ANTIGEN ANTIBODY: CPT | Mod: 59 | Performed by: STUDENT IN AN ORGANIZED HEALTH CARE EDUCATION/TRAINING PROGRAM

## 2021-09-27 PROCEDURE — 86592 SYPHILIS TEST NON-TREP QUAL: CPT | Performed by: STUDENT IN AN ORGANIZED HEALTH CARE EDUCATION/TRAINING PROGRAM

## 2021-09-27 PROCEDURE — 84425 ASSAY OF VITAMIN B-1: CPT | Performed by: STUDENT IN AN ORGANIZED HEALTH CARE EDUCATION/TRAINING PROGRAM

## 2021-09-27 PROCEDURE — 86706 HEP B SURFACE ANTIBODY: CPT | Performed by: STUDENT IN AN ORGANIZED HEALTH CARE EDUCATION/TRAINING PROGRAM

## 2021-09-27 PROCEDURE — 95913 NRV CNDJ TEST 13/> STUDIES: CPT | Mod: S$GLB,,, | Performed by: PSYCHIATRY & NEUROLOGY

## 2021-09-27 PROCEDURE — 86803 HEPATITIS C AB TEST: CPT | Performed by: STUDENT IN AN ORGANIZED HEALTH CARE EDUCATION/TRAINING PROGRAM

## 2021-09-27 PROCEDURE — 95886 PR EMG COMPLETE, W/ NERVE CONDUCTION STUDIES, 5+ MUSCLES: ICD-10-PCS | Mod: S$GLB,,, | Performed by: PSYCHIATRY & NEUROLOGY

## 2021-09-27 PROCEDURE — 86705 HEP B CORE ANTIBODY IGM: CPT | Performed by: STUDENT IN AN ORGANIZED HEALTH CARE EDUCATION/TRAINING PROGRAM

## 2021-09-27 PROCEDURE — 87389 HIV-1 AG W/HIV-1&-2 AB AG IA: CPT | Performed by: STUDENT IN AN ORGANIZED HEALTH CARE EDUCATION/TRAINING PROGRAM

## 2021-09-27 PROCEDURE — 83036 HEMOGLOBIN GLYCOSYLATED A1C: CPT | Performed by: STUDENT IN AN ORGANIZED HEALTH CARE EDUCATION/TRAINING PROGRAM

## 2021-09-27 PROCEDURE — 82300 ASSAY OF CADMIUM: CPT | Performed by: STUDENT IN AN ORGANIZED HEALTH CARE EDUCATION/TRAINING PROGRAM

## 2021-09-27 PROCEDURE — 86200 CCP ANTIBODY: CPT | Performed by: STUDENT IN AN ORGANIZED HEALTH CARE EDUCATION/TRAINING PROGRAM

## 2021-09-27 PROCEDURE — 86235 NUCLEAR ANTIGEN ANTIBODY: CPT | Performed by: STUDENT IN AN ORGANIZED HEALTH CARE EDUCATION/TRAINING PROGRAM

## 2021-09-27 PROCEDURE — 86038 ANTINUCLEAR ANTIBODIES: CPT | Performed by: STUDENT IN AN ORGANIZED HEALTH CARE EDUCATION/TRAINING PROGRAM

## 2021-09-27 PROCEDURE — 95886 MUSC TEST DONE W/N TEST COMP: CPT | Mod: S$GLB,,, | Performed by: PSYCHIATRY & NEUROLOGY

## 2021-09-28 ENCOUNTER — TELEPHONE (OUTPATIENT)
Dept: RHEUMATOLOGY | Facility: CLINIC | Age: 41
End: 2021-09-28

## 2021-09-28 ENCOUNTER — PATIENT MESSAGE (OUTPATIENT)
Dept: RHEUMATOLOGY | Facility: CLINIC | Age: 41
End: 2021-09-28

## 2021-09-28 LAB
ANA SER QL IF: NORMAL
DSDNA AB SER-ACNC: NORMAL [IU]/ML
HBV CORE IGM SERPL QL IA: NEGATIVE
HBV SURFACE AB SER-ACNC: NEGATIVE M[IU]/ML
HCV AB SERPL QL IA: NEGATIVE
HIV 1+2 AB+HIV1 P24 AG SERPL QL IA: NEGATIVE
RPR SER QL: NORMAL

## 2021-09-29 LAB
ANTI-SSA ANTIBODY: 0.04 RATIO (ref 0–0.99)
ANTI-SSA INTERPRETATION: NEGATIVE
ANTI-SSB ANTIBODY: 0.04 RATIO (ref 0–0.99)
ANTI-SSB INTERPRETATION: NEGATIVE
ARSENIC BLD-MCNC: 1 NG/ML
CADMIUM BLD-MCNC: <0.2 NG/ML
CITY: NORMAL
COUNTY: NORMAL
GUARDIAN FIRST NAME: NORMAL
GUARDIAN LAST NAME: NORMAL
HOME PHONE: NORMAL
LEAD BLD-MCNC: 1.2 MCG/DL
MERCURY BLD-MCNC: 2 NG/ML
RACE: NORMAL
STATE: NORMAL
STREET ADDRESS: NORMAL
VENOUS/CAPILLARY: NORMAL
ZIP: NORMAL

## 2021-09-30 LAB
B BURGDOR AB SER IA-ACNC: 0.08 INDEX VALUE
CARDIOLIPIN IGG SER IA-ACNC: <9.4 GPL (ref 0–14.99)
CARDIOLIPIN IGM SER IA-ACNC: 12.3 MPL (ref 0–12.49)

## 2021-10-01 PROBLEM — G62.9 PERIPHERAL NEUROPATHY: Status: ACTIVE | Noted: 2021-10-01

## 2021-10-01 LAB — VIT B1 BLD-MCNC: 70 UG/L (ref 38–122)

## 2021-10-04 ENCOUNTER — IMMUNIZATION (OUTPATIENT)
Dept: OBSTETRICS AND GYNECOLOGY | Facility: CLINIC | Age: 41
End: 2021-10-04
Payer: COMMERCIAL

## 2021-10-04 DIAGNOSIS — Z23 NEED FOR VACCINATION: Primary | ICD-10-CM

## 2021-10-04 PROCEDURE — 0001A COVID-19, MRNA, LNP-S, PF, 30 MCG/0.3 ML DOSE VACCINE: CPT | Mod: PBBFAC | Performed by: FAMILY MEDICINE

## 2021-10-06 ENCOUNTER — TELEPHONE (OUTPATIENT)
Dept: RHEUMATOLOGY | Facility: CLINIC | Age: 41
End: 2021-10-06

## 2021-10-11 ENCOUNTER — OFFICE VISIT (OUTPATIENT)
Dept: RHEUMATOLOGY | Facility: CLINIC | Age: 41
End: 2021-10-11
Payer: COMMERCIAL

## 2021-10-11 ENCOUNTER — HOSPITAL ENCOUNTER (OUTPATIENT)
Dept: RADIOLOGY | Facility: HOSPITAL | Age: 41
Discharge: HOME | End: 2021-10-11
Attending: INTERNAL MEDICINE
Payer: COMMERCIAL

## 2021-10-11 VITALS
BODY MASS INDEX: 40.46 KG/M2 | DIASTOLIC BLOOD PRESSURE: 88 MMHG | SYSTOLIC BLOOD PRESSURE: 121 MMHG | HEIGHT: 63 IN | HEART RATE: 73 BPM | WEIGHT: 228.38 LBS

## 2021-10-11 DIAGNOSIS — M25.50 POLYARTHRALGIA: Primary | ICD-10-CM

## 2021-10-11 DIAGNOSIS — G60.9 IDIOPATHIC PERIPHERAL NEUROPATHY: ICD-10-CM

## 2021-10-11 DIAGNOSIS — M25.50 POLYARTHRALGIA: ICD-10-CM

## 2021-10-11 PROCEDURE — 1160F PR REVIEW ALL MEDS BY PRESCRIBER/CLIN PHARMACIST DOCUMENTED: ICD-10-PCS | Mod: CPTII,S$GLB,, | Performed by: INTERNAL MEDICINE

## 2021-10-11 PROCEDURE — 73130 X-RAY EXAM OF HAND: CPT | Mod: TC,50

## 2021-10-11 PROCEDURE — 3079F PR MOST RECENT DIASTOLIC BLOOD PRESSURE 80-89 MM HG: ICD-10-PCS | Mod: CPTII,S$GLB,, | Performed by: INTERNAL MEDICINE

## 2021-10-11 PROCEDURE — 3074F PR MOST RECENT SYSTOLIC BLOOD PRESSURE < 130 MM HG: ICD-10-PCS | Mod: CPTII,S$GLB,, | Performed by: INTERNAL MEDICINE

## 2021-10-11 PROCEDURE — 99203 PR OFFICE/OUTPT VISIT, NEW, LEVL III, 30-44 MIN: ICD-10-PCS | Mod: S$GLB,,, | Performed by: INTERNAL MEDICINE

## 2021-10-11 PROCEDURE — 99203 OFFICE O/P NEW LOW 30 MIN: CPT | Mod: S$GLB,,, | Performed by: INTERNAL MEDICINE

## 2021-10-11 PROCEDURE — 3044F HG A1C LEVEL LT 7.0%: CPT | Mod: CPTII,S$GLB,, | Performed by: INTERNAL MEDICINE

## 2021-10-11 PROCEDURE — 99999 PR PBB SHADOW E&M-EST. PATIENT-LVL III: CPT | Mod: PBBFAC,,, | Performed by: INTERNAL MEDICINE

## 2021-10-11 PROCEDURE — 3008F PR BODY MASS INDEX (BMI) DOCUMENTED: ICD-10-PCS | Mod: CPTII,S$GLB,, | Performed by: INTERNAL MEDICINE

## 2021-10-11 PROCEDURE — 73130 X-RAY EXAM OF HAND: CPT | Mod: 26,LT,, | Performed by: RADIOLOGY

## 2021-10-11 PROCEDURE — 73130 X-RAY EXAM OF HAND: CPT | Mod: 26,RT,, | Performed by: RADIOLOGY

## 2021-10-11 PROCEDURE — 99999 PR PBB SHADOW E&M-EST. PATIENT-LVL III: ICD-10-PCS | Mod: PBBFAC,,, | Performed by: INTERNAL MEDICINE

## 2021-10-11 PROCEDURE — 1160F RVW MEDS BY RX/DR IN RCRD: CPT | Mod: CPTII,S$GLB,, | Performed by: INTERNAL MEDICINE

## 2021-10-11 PROCEDURE — 3008F BODY MASS INDEX DOCD: CPT | Mod: CPTII,S$GLB,, | Performed by: INTERNAL MEDICINE

## 2021-10-11 PROCEDURE — 3074F SYST BP LT 130 MM HG: CPT | Mod: CPTII,S$GLB,, | Performed by: INTERNAL MEDICINE

## 2021-10-11 PROCEDURE — 3044F PR MOST RECENT HEMOGLOBIN A1C LEVEL <7.0%: ICD-10-PCS | Mod: CPTII,S$GLB,, | Performed by: INTERNAL MEDICINE

## 2021-10-11 PROCEDURE — 1159F PR MEDICATION LIST DOCUMENTED IN MEDICAL RECORD: ICD-10-PCS | Mod: CPTII,S$GLB,, | Performed by: INTERNAL MEDICINE

## 2021-10-11 PROCEDURE — 1159F MED LIST DOCD IN RCRD: CPT | Mod: CPTII,S$GLB,, | Performed by: INTERNAL MEDICINE

## 2021-10-11 PROCEDURE — 3079F DIAST BP 80-89 MM HG: CPT | Mod: CPTII,S$GLB,, | Performed by: INTERNAL MEDICINE

## 2021-10-11 PROCEDURE — 73130 PR  X-RAY HAND 3+ VW: ICD-10-PCS | Mod: 26,LT,, | Performed by: RADIOLOGY

## 2021-10-11 RX ORDER — MELOXICAM 15 MG/1
15 TABLET ORAL DAILY
Qty: 30 TABLET | Refills: 2 | Status: SHIPPED | OUTPATIENT
Start: 2021-10-11 | End: 2022-01-19

## 2021-10-11 ASSESSMENT — ROUTINE ASSESSMENT OF PATIENT INDEX DATA (RAPID3)
MDHAQ FUNCTION SCORE: 0.2
WHEN YOU AWAKENED IN THE MORNING OVER THE LAST WEEK, PLEASE INDICATE THE AMOUNT OF TIME IT TAKES UNTIL YOU ARE AS LIMBER AS YOU WILL BE FOR THE DAY: 4 HOURS
PATIENT GLOBAL ASSESSMENT SCORE: 5.5
PSYCHOLOGICAL DISTRESS SCORE: 0
TOTAL RAPID3 SCORE: 4.56
PAIN SCORE: 7.5
FATIGUE SCORE: 4
AM STIFFNESS SCORE: 1, YES

## 2021-10-13 PROBLEM — M25.50 POLYARTHRALGIA: Status: ACTIVE | Noted: 2021-10-13

## 2021-10-14 ENCOUNTER — PATIENT MESSAGE (OUTPATIENT)
Dept: RHEUMATOLOGY | Facility: CLINIC | Age: 41
End: 2021-10-14

## 2021-10-25 ENCOUNTER — IMMUNIZATION (OUTPATIENT)
Dept: OBSTETRICS AND GYNECOLOGY | Facility: CLINIC | Age: 41
End: 2021-10-25
Payer: COMMERCIAL

## 2021-10-25 DIAGNOSIS — Z23 NEED FOR VACCINATION: Primary | ICD-10-CM

## 2021-10-25 PROCEDURE — 91300 COVID-19, MRNA, LNP-S, PF, 30 MCG/0.3 ML DOSE VACCINE: CPT | Mod: PBBFAC | Performed by: FAMILY MEDICINE

## 2021-10-25 PROCEDURE — 0002A COVID-19, MRNA, LNP-S, PF, 30 MCG/0.3 ML DOSE VACCINE: CPT | Mod: PBBFAC | Performed by: FAMILY MEDICINE

## 2021-10-26 ENCOUNTER — OFFICE VISIT (OUTPATIENT)
Dept: NEUROLOGY | Facility: CLINIC | Age: 41
End: 2021-10-26
Payer: COMMERCIAL

## 2021-10-26 ENCOUNTER — OFFICE VISIT (OUTPATIENT)
Dept: DERMATOLOGY | Facility: CLINIC | Age: 41
End: 2021-10-26
Payer: COMMERCIAL

## 2021-10-26 VITALS
WEIGHT: 218.25 LBS | HEART RATE: 107 BPM | SYSTOLIC BLOOD PRESSURE: 104 MMHG | BODY MASS INDEX: 38.67 KG/M2 | HEIGHT: 63 IN | DIASTOLIC BLOOD PRESSURE: 61 MMHG

## 2021-10-26 DIAGNOSIS — L40.9 SCALP PSORIASIS: Primary | ICD-10-CM

## 2021-10-26 DIAGNOSIS — G60.3 IDIOPATHIC PROGRESSIVE POLYNEUROPATHY: Primary | ICD-10-CM

## 2021-10-26 PROCEDURE — 99215 PR OFFICE/OUTPT VISIT, EST, LEVL V, 40-54 MIN: ICD-10-PCS | Mod: S$GLB,,, | Performed by: PSYCHIATRY & NEUROLOGY

## 2021-10-26 PROCEDURE — 99999 PR PBB SHADOW E&M-EST. PATIENT-LVL III: ICD-10-PCS | Mod: PBBFAC,,, | Performed by: PSYCHIATRY & NEUROLOGY

## 2021-10-26 PROCEDURE — 99215 OFFICE O/P EST HI 40 MIN: CPT | Mod: S$GLB,,, | Performed by: PSYCHIATRY & NEUROLOGY

## 2021-10-26 PROCEDURE — 99204 PR OFFICE/OUTPT VISIT, NEW, LEVL IV, 45-59 MIN: ICD-10-PCS | Mod: S$GLB,,, | Performed by: DERMATOLOGY

## 2021-10-26 PROCEDURE — 99999 PR PBB SHADOW E&M-EST. PATIENT-LVL II: ICD-10-PCS | Mod: PBBFAC,,, | Performed by: DERMATOLOGY

## 2021-10-26 PROCEDURE — 99204 OFFICE O/P NEW MOD 45 MIN: CPT | Mod: S$GLB,,, | Performed by: DERMATOLOGY

## 2021-10-26 PROCEDURE — 3008F BODY MASS INDEX DOCD: CPT | Mod: CPTII,S$GLB,, | Performed by: PSYCHIATRY & NEUROLOGY

## 2021-10-26 PROCEDURE — 3008F PR BODY MASS INDEX (BMI) DOCUMENTED: ICD-10-PCS | Mod: CPTII,S$GLB,, | Performed by: PSYCHIATRY & NEUROLOGY

## 2021-10-26 PROCEDURE — 3044F PR MOST RECENT HEMOGLOBIN A1C LEVEL <7.0%: ICD-10-PCS | Mod: CPTII,S$GLB,, | Performed by: PSYCHIATRY & NEUROLOGY

## 2021-10-26 PROCEDURE — 3078F PR MOST RECENT DIASTOLIC BLOOD PRESSURE < 80 MM HG: ICD-10-PCS | Mod: CPTII,S$GLB,, | Performed by: PSYCHIATRY & NEUROLOGY

## 2021-10-26 PROCEDURE — 1160F RVW MEDS BY RX/DR IN RCRD: CPT | Mod: CPTII,S$GLB,, | Performed by: DERMATOLOGY

## 2021-10-26 PROCEDURE — 3074F SYST BP LT 130 MM HG: CPT | Mod: CPTII,S$GLB,, | Performed by: PSYCHIATRY & NEUROLOGY

## 2021-10-26 PROCEDURE — 1159F PR MEDICATION LIST DOCUMENTED IN MEDICAL RECORD: ICD-10-PCS | Mod: CPTII,S$GLB,, | Performed by: DERMATOLOGY

## 2021-10-26 PROCEDURE — 3044F PR MOST RECENT HEMOGLOBIN A1C LEVEL <7.0%: ICD-10-PCS | Mod: CPTII,S$GLB,, | Performed by: DERMATOLOGY

## 2021-10-26 PROCEDURE — 99999 PR PBB SHADOW E&M-EST. PATIENT-LVL II: CPT | Mod: PBBFAC,,, | Performed by: DERMATOLOGY

## 2021-10-26 PROCEDURE — 3078F DIAST BP <80 MM HG: CPT | Mod: CPTII,S$GLB,, | Performed by: PSYCHIATRY & NEUROLOGY

## 2021-10-26 PROCEDURE — 1159F MED LIST DOCD IN RCRD: CPT | Mod: CPTII,S$GLB,, | Performed by: DERMATOLOGY

## 2021-10-26 PROCEDURE — 99999 PR PBB SHADOW E&M-EST. PATIENT-LVL III: CPT | Mod: PBBFAC,,, | Performed by: PSYCHIATRY & NEUROLOGY

## 2021-10-26 PROCEDURE — 1160F PR REVIEW ALL MEDS BY PRESCRIBER/CLIN PHARMACIST DOCUMENTED: ICD-10-PCS | Mod: CPTII,S$GLB,, | Performed by: DERMATOLOGY

## 2021-10-26 PROCEDURE — 3044F HG A1C LEVEL LT 7.0%: CPT | Mod: CPTII,S$GLB,, | Performed by: DERMATOLOGY

## 2021-10-26 PROCEDURE — 3074F PR MOST RECENT SYSTOLIC BLOOD PRESSURE < 130 MM HG: ICD-10-PCS | Mod: CPTII,S$GLB,, | Performed by: PSYCHIATRY & NEUROLOGY

## 2021-10-26 PROCEDURE — 3044F HG A1C LEVEL LT 7.0%: CPT | Mod: CPTII,S$GLB,, | Performed by: PSYCHIATRY & NEUROLOGY

## 2021-10-26 RX ORDER — CLOBETASOL PROPIONATE 0.46 MG/ML
SOLUTION TOPICAL 2 TIMES DAILY
Qty: 50 ML | Refills: 3 | Status: SHIPPED | OUTPATIENT
Start: 2021-10-26 | End: 2021-11-19 | Stop reason: SDUPTHER

## 2021-10-26 RX ORDER — KETOCONAZOLE 20 MG/ML
SHAMPOO, SUSPENSION TOPICAL
Qty: 120 ML | Refills: 5 | Status: SHIPPED | OUTPATIENT
Start: 2021-10-28 | End: 2021-11-19 | Stop reason: SDUPTHER

## 2021-10-27 ENCOUNTER — PATIENT MESSAGE (OUTPATIENT)
Dept: RHEUMATOLOGY | Facility: CLINIC | Age: 41
End: 2021-10-27
Payer: COMMERCIAL

## 2021-11-18 ENCOUNTER — PATIENT MESSAGE (OUTPATIENT)
Dept: DERMATOLOGY | Facility: CLINIC | Age: 41
End: 2021-11-18
Payer: COMMERCIAL

## 2021-11-18 DIAGNOSIS — L40.9 SCALP PSORIASIS: ICD-10-CM

## 2021-11-19 RX ORDER — KETOCONAZOLE 20 MG/ML
SHAMPOO, SUSPENSION TOPICAL
Qty: 120 ML | Refills: 5 | Status: SHIPPED | OUTPATIENT
Start: 2021-11-22 | End: 2022-08-02 | Stop reason: SDUPTHER

## 2021-11-19 RX ORDER — CLOBETASOL PROPIONATE 0.46 MG/ML
SOLUTION TOPICAL 2 TIMES DAILY
Qty: 50 ML | Refills: 3 | Status: SHIPPED | OUTPATIENT
Start: 2021-11-19 | End: 2022-08-02 | Stop reason: SDUPTHER

## 2022-03-03 ENCOUNTER — TELEPHONE (OUTPATIENT)
Dept: RHEUMATOLOGY | Facility: CLINIC | Age: 42
End: 2022-03-03
Payer: COMMERCIAL

## 2022-03-03 NOTE — TELEPHONE ENCOUNTER
Left voicemail asking patient to call the office back regarding an appointment to be seen early.      ----- Message from Jennifer Arenas sent at 3/3/2022 10:34 AM CST -----  Contact: pt  Pt requesting call back for sooner appt . Epic has 6-24 for next available , [place] . Pt state that symptoms have gotten worst and needs to be seen right away. Please call               Confirmed patient's contact info below:  Contact Name: Dede Salmeronjoyce  Phone Number: [phone]

## 2022-03-08 ENCOUNTER — OFFICE VISIT (OUTPATIENT)
Dept: RHEUMATOLOGY | Facility: CLINIC | Age: 42
End: 2022-03-08
Payer: COMMERCIAL

## 2022-03-08 DIAGNOSIS — L40.50 PSORIATIC ARTHRITIS: Primary | Chronic | ICD-10-CM

## 2022-03-08 DIAGNOSIS — Z79.899 HIGH RISK MEDICATION USE: ICD-10-CM

## 2022-03-08 PROCEDURE — 1160F PR REVIEW ALL MEDS BY PRESCRIBER/CLIN PHARMACIST DOCUMENTED: ICD-10-PCS | Mod: CPTII,95,, | Performed by: INTERNAL MEDICINE

## 2022-03-08 PROCEDURE — 1159F PR MEDICATION LIST DOCUMENTED IN MEDICAL RECORD: ICD-10-PCS | Mod: CPTII,95,, | Performed by: INTERNAL MEDICINE

## 2022-03-08 PROCEDURE — 99214 OFFICE O/P EST MOD 30 MIN: CPT | Mod: 95,,, | Performed by: INTERNAL MEDICINE

## 2022-03-08 PROCEDURE — 1160F RVW MEDS BY RX/DR IN RCRD: CPT | Mod: CPTII,95,, | Performed by: INTERNAL MEDICINE

## 2022-03-08 PROCEDURE — 1159F MED LIST DOCD IN RCRD: CPT | Mod: CPTII,95,, | Performed by: INTERNAL MEDICINE

## 2022-03-08 PROCEDURE — 99214 PR OFFICE/OUTPT VISIT, EST, LEVL IV, 30-39 MIN: ICD-10-PCS | Mod: 95,,, | Performed by: INTERNAL MEDICINE

## 2022-03-08 RX ORDER — SULFASALAZINE 500 MG/1
TABLET ORAL
Qty: 126 TABLET | Refills: 0 | Status: SHIPPED | OUTPATIENT
Start: 2022-03-08 | End: 2022-04-05

## 2022-03-08 RX ORDER — SULFASALAZINE 500 MG/1
1500 TABLET ORAL 2 TIMES DAILY
Qty: 180 TABLET | Refills: 2 | Status: SHIPPED | OUTPATIENT
Start: 2022-04-05 | End: 2022-08-04

## 2022-03-08 ASSESSMENT — ROUTINE ASSESSMENT OF PATIENT INDEX DATA (RAPID3)
FATIGUE SCORE: 6.5
PATIENT GLOBAL ASSESSMENT SCORE: 5.5
TOTAL RAPID3 SCORE: 4
PSYCHOLOGICAL DISTRESS SCORE: 1.1
MDHAQ FUNCTION SCORE: 0.3
PAIN SCORE: 5.5

## 2022-03-08 NOTE — ASSESSMENT & PLAN NOTE
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

## 2022-03-08 NOTE — PROGRESS NOTES
Subjective:       Patient ID: Dede Silver is a 41 y.o. female.    Chief Complaint: Disease Management    HPI     The patient location is: [place]  The chief complaint leading to consultation is: arthritis    Visit type: audiovisual    Face to Face time with patient: 15  25  minutes of total time spent on the encounter, which includes face to face time and non-face to face time preparing to see the patient (eg, review of tests), Obtaining and/or reviewing separately obtained history, Documenting clinical information in the electronic or other health record, Independently interpreting results (not separately reported) and communicating results to the patient/family/caregiver, or Care coordination (not separately reported).         Each patient to whom he or she provides medical services by telemedicine is:  (1) informed of the relationship between the physician and patient and the respective role of any other health care provider with respect to management of the patient; and (2) notified that he or she may decline to receive medical services by telemedicine and may withdraw from such care at any time.    Notes:    Saw me Oct 2021 and had normal lab workup    Still has pain in hands  Swelling of MCP's  Taking meloxicam  Thumb CMC pain  Toes also hurt  Overall feels more pain and stiffness  Knee and heel on and off; small joints all the time; other joints on and off  Tender achilles and sometimes under heel as well  Achilles not swollen  No knee swelling  No dactylitis  Psoriasis on scalp    Sometimes adds ibuprofen or tylenol    Nursing so cannot take MTX; probably 3 more months  No intention to get more children  No Raynauds    Review of Systems   Constitutional: Negative for fever and unexpected weight change.   HENT: Negative for mouth sores and trouble swallowing.    Eyes: Negative for redness.   Respiratory: Negative for cough and shortness of breath.    Cardiovascular: Negative for chest pain.   Gastrointestinal:  Negative for constipation and diarrhea.   Genitourinary: Negative for dysuria and genital sores.   Skin: Negative for rash.   Neurological: Negative for headaches.   Hematological: Does not bruise/bleed easily.         Rapid3 Question Responses and Scores 3/8/2022   MDHAQ Score 0.3   Psychologic Score 1.1   Pain Score 5.5   When you awakened in the morning OVER THE LAST WEEK, did you feel stiff? Yes   If Yes, please indicate the number of hours until you are as limber as you will be for the day 3   Fatigue Score 6.5   Global Health Score 5.5   RAPID3 Score 4        Objective:   There were no vitals taken for this visit.     Physical Exam      Assessment:       1. Psoriatic arthritis    2. High risk medication use            Plan:       Problem List Items Addressed This Visit        Active Problems    Psoriatic arthritis - Primary (Chronic)     Symptoms have persisted and she reports scalp psoriasis  Discussed DMARD options; she is nursing for probably 3 more months so will start SSZ and get labs and f/u 3 months           Relevant Medications    sulfaSALAzine (AZULFIDINE) 500 mg Tab    sulfaSALAzine (AZULFIDINE) 500 mg Tab (Start on 4/5/2022)    Other Relevant Orders    Sedimentation rate    C-Reactive Protein      Other Visit Diagnoses     High risk medication use        Relevant Orders    Comprehensive Metabolic Panel    CBC Auto Differential

## 2022-03-24 ENCOUNTER — LAB VISIT (OUTPATIENT)
Dept: LAB | Facility: HOSPITAL | Age: 42
End: 2022-03-24
Attending: INTERNAL MEDICINE
Payer: COMMERCIAL

## 2022-03-24 ENCOUNTER — OFFICE VISIT (OUTPATIENT)
Dept: FAMILY MEDICINE | Facility: CLINIC | Age: 42
End: 2022-03-24
Payer: COMMERCIAL

## 2022-03-24 VITALS
WEIGHT: 215.63 LBS | OXYGEN SATURATION: 97 % | SYSTOLIC BLOOD PRESSURE: 110 MMHG | TEMPERATURE: 100 F | BODY MASS INDEX: 38.21 KG/M2 | HEART RATE: 85 BPM | DIASTOLIC BLOOD PRESSURE: 70 MMHG | HEIGHT: 63 IN

## 2022-03-24 DIAGNOSIS — Z79.899 HIGH RISK MEDICATION USE: ICD-10-CM

## 2022-03-24 DIAGNOSIS — R30.0 DYSURIA: ICD-10-CM

## 2022-03-24 DIAGNOSIS — R50.9 FEVER, UNSPECIFIED FEVER CAUSE: ICD-10-CM

## 2022-03-24 DIAGNOSIS — G60.3 IDIOPATHIC PROGRESSIVE POLYNEUROPATHY: ICD-10-CM

## 2022-03-24 DIAGNOSIS — N30.01 ACUTE CYSTITIS WITH HEMATURIA: Primary | ICD-10-CM

## 2022-03-24 DIAGNOSIS — R10.9 FLANK PAIN: ICD-10-CM

## 2022-03-24 DIAGNOSIS — R52 GENERALIZED BODY ACHES: ICD-10-CM

## 2022-03-24 DIAGNOSIS — L40.50 PSORIATIC ARTHRITIS: Chronic | ICD-10-CM

## 2022-03-24 LAB
ALBUMIN SERPL BCP-MCNC: 4 G/DL (ref 3.5–5.2)
ALP SERPL-CCNC: 85 U/L (ref 55–135)
ALT SERPL W/O P-5'-P-CCNC: 13 U/L (ref 10–44)
ANION GAP SERPL CALC-SCNC: 10 MMOL/L (ref 8–16)
AST SERPL-CCNC: 12 U/L (ref 10–40)
BASOPHILS # BLD AUTO: 0.01 K/UL (ref 0–0.2)
BASOPHILS NFR BLD: 0.2 % (ref 0–1.9)
BILIRUB SERPL-MCNC: 0.5 MG/DL (ref 0.1–1)
BILIRUB SERPL-MCNC: ABNORMAL MG/DL
BLOOD URINE, POC: ABNORMAL
BUN SERPL-MCNC: 13 MG/DL (ref 6–20)
CALCIUM SERPL-MCNC: 9.1 MG/DL (ref 8.7–10.5)
CHLORIDE SERPL-SCNC: 104 MMOL/L (ref 95–110)
CLARITY, POC UA: CLEAR
CO2 SERPL-SCNC: 25 MMOL/L (ref 23–29)
COLOR, POC UA: YELLOW
CREAT SERPL-MCNC: 0.8 MG/DL (ref 0.5–1.4)
CRP SERPL-MCNC: 19.4 MG/L (ref 0–8.2)
DIFFERENTIAL METHOD: ABNORMAL
EOSINOPHIL # BLD AUTO: 0.4 K/UL (ref 0–0.5)
EOSINOPHIL NFR BLD: 9 % (ref 0–8)
ERYTHROCYTE [DISTWIDTH] IN BLOOD BY AUTOMATED COUNT: 13 % (ref 11.5–14.5)
ERYTHROCYTE [SEDIMENTATION RATE] IN BLOOD BY WESTERGREN METHOD: 26 MM/HR (ref 0–36)
EST. GFR  (AFRICAN AMERICAN): >60 ML/MIN/1.73 M^2
EST. GFR  (NON AFRICAN AMERICAN): >60 ML/MIN/1.73 M^2
GLUCOSE SERPL-MCNC: 125 MG/DL (ref 70–110)
GLUCOSE UR QL STRIP: NORMAL
HCT VFR BLD AUTO: 38.8 % (ref 37–48.5)
HGB BLD-MCNC: 12.5 G/DL (ref 12–16)
IMM GRANULOCYTES # BLD AUTO: 0.01 K/UL (ref 0–0.04)
IMM GRANULOCYTES NFR BLD AUTO: 0.2 % (ref 0–0.5)
KETONES UR QL STRIP: NEGATIVE
LEUKOCYTE ESTERASE URINE, POC: ABNORMAL
LYMPHOCYTES # BLD AUTO: 1.1 K/UL (ref 1–4.8)
LYMPHOCYTES NFR BLD: 27 % (ref 18–48)
MCH RBC QN AUTO: 30.2 PG (ref 27–31)
MCHC RBC AUTO-ENTMCNC: 32.2 G/DL (ref 32–36)
MCV RBC AUTO: 94 FL (ref 82–98)
MONOCYTES # BLD AUTO: 0.3 K/UL (ref 0.3–1)
MONOCYTES NFR BLD: 6.9 % (ref 4–15)
NEUTROPHILS # BLD AUTO: 2.4 K/UL (ref 1.8–7.7)
NEUTROPHILS NFR BLD: 56.7 % (ref 38–73)
NITRITE, POC UA: NEGATIVE
NRBC BLD-RTO: 0 /100 WBC
PH, POC UA: 7
PLATELET # BLD AUTO: 170 K/UL (ref 150–450)
PMV BLD AUTO: 10.5 FL (ref 9.2–12.9)
POTASSIUM SERPL-SCNC: 4.3 MMOL/L (ref 3.5–5.1)
PROT SERPL-MCNC: 7.8 G/DL (ref 6–8.4)
PROTEIN, POC: NEGATIVE
RBC # BLD AUTO: 4.14 M/UL (ref 4–5.4)
SODIUM SERPL-SCNC: 139 MMOL/L (ref 136–145)
SPECIFIC GRAVITY, POC UA: 1.01
UROBILINOGEN, POC UA: NORMAL
WBC # BLD AUTO: 4.23 K/UL (ref 3.9–12.7)

## 2022-03-24 PROCEDURE — 3008F BODY MASS INDEX DOCD: CPT | Mod: CPTII,S$GLB,, | Performed by: NURSE PRACTITIONER

## 2022-03-24 PROCEDURE — 83520 IMMUNOASSAY QUANT NOS NONAB: CPT | Mod: 59 | Performed by: PSYCHIATRY & NEUROLOGY

## 2022-03-24 PROCEDURE — 99999 PR PBB SHADOW E&M-EST. PATIENT-LVL IV: CPT | Mod: PBBFAC,,, | Performed by: NURSE PRACTITIONER

## 2022-03-24 PROCEDURE — 99204 OFFICE O/P NEW MOD 45 MIN: CPT | Mod: 25,S$GLB,, | Performed by: NURSE PRACTITIONER

## 2022-03-24 PROCEDURE — 3074F SYST BP LT 130 MM HG: CPT | Mod: CPTII,S$GLB,, | Performed by: NURSE PRACTITIONER

## 2022-03-24 PROCEDURE — 36415 COLL VENOUS BLD VENIPUNCTURE: CPT | Mod: PO | Performed by: PSYCHIATRY & NEUROLOGY

## 2022-03-24 PROCEDURE — 86140 C-REACTIVE PROTEIN: CPT | Performed by: INTERNAL MEDICINE

## 2022-03-24 PROCEDURE — 3078F PR MOST RECENT DIASTOLIC BLOOD PRESSURE < 80 MM HG: ICD-10-PCS | Mod: CPTII,S$GLB,, | Performed by: NURSE PRACTITIONER

## 2022-03-24 PROCEDURE — 81002 URINALYSIS NONAUTO W/O SCOPE: CPT | Mod: S$GLB,,, | Performed by: NURSE PRACTITIONER

## 2022-03-24 PROCEDURE — 99204 PR OFFICE/OUTPT VISIT, NEW, LEVL IV, 45-59 MIN: ICD-10-PCS | Mod: 25,S$GLB,, | Performed by: NURSE PRACTITIONER

## 2022-03-24 PROCEDURE — 96372 PR INJECTION,THERAP/PROPH/DIAG2ST, IM OR SUBCUT: ICD-10-PCS | Mod: S$GLB,,, | Performed by: NURSE PRACTITIONER

## 2022-03-24 PROCEDURE — 1160F PR REVIEW ALL MEDS BY PRESCRIBER/CLIN PHARMACIST DOCUMENTED: ICD-10-PCS | Mod: CPTII,S$GLB,, | Performed by: NURSE PRACTITIONER

## 2022-03-24 PROCEDURE — 1159F MED LIST DOCD IN RCRD: CPT | Mod: CPTII,S$GLB,, | Performed by: NURSE PRACTITIONER

## 2022-03-24 PROCEDURE — 3078F DIAST BP <80 MM HG: CPT | Mod: CPTII,S$GLB,, | Performed by: NURSE PRACTITIONER

## 2022-03-24 PROCEDURE — 80053 COMPREHEN METABOLIC PANEL: CPT | Performed by: INTERNAL MEDICINE

## 2022-03-24 PROCEDURE — 1159F PR MEDICATION LIST DOCUMENTED IN MEDICAL RECORD: ICD-10-PCS | Mod: CPTII,S$GLB,, | Performed by: NURSE PRACTITIONER

## 2022-03-24 PROCEDURE — 87086 URINE CULTURE/COLONY COUNT: CPT | Performed by: NURSE PRACTITIONER

## 2022-03-24 PROCEDURE — 99999 PR PBB SHADOW E&M-EST. PATIENT-LVL IV: ICD-10-PCS | Mod: PBBFAC,,, | Performed by: NURSE PRACTITIONER

## 2022-03-24 PROCEDURE — 3074F PR MOST RECENT SYSTOLIC BLOOD PRESSURE < 130 MM HG: ICD-10-PCS | Mod: CPTII,S$GLB,, | Performed by: NURSE PRACTITIONER

## 2022-03-24 PROCEDURE — 81002 POCT URINE DIPSTICK WITHOUT MICROSCOPE: ICD-10-PCS | Mod: S$GLB,,, | Performed by: NURSE PRACTITIONER

## 2022-03-24 PROCEDURE — 96372 THER/PROPH/DIAG INJ SC/IM: CPT | Mod: S$GLB,,, | Performed by: NURSE PRACTITIONER

## 2022-03-24 PROCEDURE — 83516 IMMUNOASSAY NONANTIBODY: CPT | Performed by: PSYCHIATRY & NEUROLOGY

## 2022-03-24 PROCEDURE — 3008F PR BODY MASS INDEX (BMI) DOCUMENTED: ICD-10-PCS | Mod: CPTII,S$GLB,, | Performed by: NURSE PRACTITIONER

## 2022-03-24 PROCEDURE — 1160F RVW MEDS BY RX/DR IN RCRD: CPT | Mod: CPTII,S$GLB,, | Performed by: NURSE PRACTITIONER

## 2022-03-24 PROCEDURE — 85025 COMPLETE CBC W/AUTO DIFF WBC: CPT | Performed by: INTERNAL MEDICINE

## 2022-03-24 PROCEDURE — 86255 FLUORESCENT ANTIBODY SCREEN: CPT | Mod: 59 | Performed by: PSYCHIATRY & NEUROLOGY

## 2022-03-24 PROCEDURE — 85652 RBC SED RATE AUTOMATED: CPT | Performed by: INTERNAL MEDICINE

## 2022-03-24 RX ORDER — PENICILLIN V POTASSIUM 500 MG/1
500 TABLET, FILM COATED ORAL EVERY 8 HOURS
Qty: 15 TABLET | Refills: 0 | Status: SHIPPED | OUTPATIENT
Start: 2022-03-24 | End: 2022-03-29

## 2022-03-24 NOTE — PATIENT INSTRUCTIONS
If there is no improvement of symptoms with antibiotic please return to clinic. May need to do a CT scan to check for kidney stone.

## 2022-03-24 NOTE — PROGRESS NOTES
Chief Complaint  Chief Complaint   Patient presents with    Fever    Flank Pain     Started yesterday    Chills    Generalized Body Aches       HPI    HPI   Ms. Dede Silver is a 41 y.o. female with medical problems as listed below. The patient presents to clinic with c/o fever, LEFT sided flank pain, chills and body aches since yesterday. The patient also admits to dysuria.    The patient states that she has a history of UTIs and also has Psoriatic arthritis.    The patient states that she took Advil for the fever and the pain which seems to help.    PAST MEDICAL HISTORY:  Past Medical History:   Diagnosis Date    Arthritis     Fibromyalgia     Peripheral neuropathy 10/1/2021       PAST SURGICAL HISTORY:  History reviewed. No pertinent surgical history.    SOCIAL HISTORY:  Social History     Socioeconomic History    Marital status:    Tobacco Use    Smoking status: Never Smoker    Smokeless tobacco: Never Used   Substance and Sexual Activity    Alcohol use: No       FAMILY HISTORY:  Family History   Problem Relation Age of Onset    Melanoma Neg Hx        ALLERGIES AND MEDICATIONS: updated and reviewed.  Review of patient's allergies indicates:  No Known Allergies  Current Outpatient Medications   Medication Sig Dispense Refill    clobetasoL (TEMOVATE) 0.05 % external solution Apply topically 2 (two) times daily. To dry scalp x 2 weeks for flares, then weekends only 50 mL 3    ketoconazole (NIZORAL) 2 % shampoo Apply topically twice a week. To scalp x 5 minutes, then rinse 120 mL 5    meloxicam (MOBIC) 15 MG tablet TAKE 1 TABLET(15 MG) BY MOUTH EVERY DAY 30 tablet 2    sulfaSALAzine (AZULFIDINE) 500 mg Tab Take 1 tablet (500 mg total) by mouth 2 (two) times a day for 7 days, THEN 2 tablets (1,000 mg total) 2 (two) times a day for 7 days, THEN 3 tablets (1,500 mg total) 2 (two) times a day for 14 days. 126 tablet 0    [START ON 4/5/2022] sulfaSALAzine (AZULFIDINE) 500 mg Tab Take 3 tablets  "(1,500 mg total) by mouth 2 (two) times a day. 180 tablet 2     27 mg iron- 1 mg Tab Take 1 tablet by mouth once daily.      penicillin v potassium (VEETID) 500 MG tablet Take 1 tablet (500 mg total) by mouth every 8 (eight) hours. for 5 days 15 tablet 0     No current facility-administered medications for this visit.       Patient Care Team:  Rosetta Ellis MD as PCP - General (Family Medicine)    ROS  Review of Systems   Constitutional: Positive for chills and fever. Negative for fatigue and unexpected weight change.   HENT: Negative for congestion, ear discharge, ear pain and postnasal drip.    Eyes: Negative for photophobia, pain and visual disturbance.   Respiratory: Negative for cough, shortness of breath and wheezing.    Cardiovascular: Negative for chest pain, palpitations and leg swelling.   Gastrointestinal: Negative for abdominal pain, constipation, diarrhea, nausea and vomiting.   Genitourinary: Positive for dysuria and flank pain. Negative for frequency, urgency and vaginal discharge.   Musculoskeletal: Positive for myalgias. Negative for back pain, joint swelling and neck stiffness.   Skin: Negative for rash.   Neurological: Negative for weakness and headaches.   Psychiatric/Behavioral: Negative for dysphoric mood and sleep disturbance. The patient is not nervous/anxious.            Physical Exam  Vitals:    03/24/22 1647   BP: 110/70   Pulse: 85   Temp: 99.6 °F (37.6 °C)   TempSrc: Oral   SpO2: 97%   Weight: 97.8 kg (215 lb 9.8 oz)   Height: 5' 3" (1.6 m)    Body mass index is 38.19 kg/m².  Weight: 97.8 kg (215 lb 9.8 oz)   Height: 5' 3" (160 cm)     Physical Exam  Constitutional:       Appearance: She is well-developed.   HENT:      Head: Normocephalic and atraumatic.      Right Ear: External ear normal.      Left Ear: External ear normal.      Nose: Nose normal.   Eyes:      Conjunctiva/sclera: Conjunctivae normal.      Pupils: Pupils are equal, round, and reactive to light.   Neck:      " Thyroid: No thyromegaly.   Cardiovascular:      Rate and Rhythm: Normal rate.   Pulmonary:      Effort: Pulmonary effort is normal.   Abdominal:      Palpations: There is no hepatomegaly or splenomegaly.      Tenderness: There is left CVA tenderness.   Genitourinary:     Vagina: Normal.   Musculoskeletal:         General: Normal range of motion.      Cervical back: Normal range of motion and neck supple.   Skin:     General: Skin is warm and dry.      Findings: No rash.   Neurological:      Mental Status: She is alert and oriented to person, place, and time.      Cranial Nerves: No cranial nerve deficit.   Psychiatric:         Behavior: Behavior normal.         Thought Content: Thought content normal.         Judgment: Judgment normal.         Health Maintenance       Date Due Completion Date    Cervical Cancer Screening Never done ---    Influenza Vaccine (1) 09/01/2021 12/28/2009    COVID-19 Vaccine (3 - Booster for Pfizer series) 03/25/2022 10/25/2021    TETANUS VACCINE 04/01/2031 4/1/2021      Health maintenance reviewed at this time.    Assessment & Plan  Acute cystitis with hematuria  -     penicillin G benzathine (BICILLIN [place]) injection 1.2 Million Units  -     penicillin v potassium (VEETID) 500 MG tablet; Take 1 tablet (500 mg total) by mouth every 8 (eight) hours. for 5 days  Dispense: 15 tablet; Refill: 0  -     Urine culture    Previous culture showed strep agalactiae; the patient states the she prefers the PCN due cipro not working last time.    Explained to the patient that due to the nature of the pain and location that we can not r/o kidney stone. Offered to do imagining at this time. Patient wanted to be treated for UTI first and then if symptoms not improving she will f/u.    Return precautions discussed    Flank pain  -     POCT URINE DIPSTICK WITHOUT MICROSCOPE    Dysuria  -     POCT URINE DIPSTICK WITHOUT MICROSCOPE    Generalized body aches/Fever, unspecified fever cause  Continue ibuprofen and  tylenol               Follow-up: Follow up if symptoms worsen or fail to improve.

## 2022-03-24 NOTE — PROGRESS NOTES
Patient tolerate Bicillin L-A 1.2 units injection. Patient advise to wait 15 min after injection  for assessment of any posssible side effects.

## 2022-03-26 LAB — BACTERIA UR CULT: NO GROWTH

## 2022-03-28 LAB
GLIADIN PEPTIDE IGA SER-ACNC: 5 UNITS
GLIADIN PEPTIDE IGG SER-ACNC: 2 UNITS
IGA SERPL-MCNC: 191 MG/DL (ref 70–400)
TTG IGA SER-ACNC: 4 UNITS
TTG IGG SER-ACNC: 3 UNITS

## 2022-03-28 NOTE — PROGRESS NOTES
Good morning,    Your culture came back with no growth. I hope you are feeling better!    Please contact me if you have any additional concerns.    Sincerely,    Yoli Estrada

## 2022-04-04 DIAGNOSIS — L40.50 PSORIATIC ARTHRITIS: Chronic | ICD-10-CM

## 2022-04-04 RX ORDER — SULFASALAZINE 500 MG/1
1500 TABLET ORAL 2 TIMES DAILY
Qty: 126 TABLET | Refills: 0 | OUTPATIENT
Start: 2022-04-04

## 2022-04-05 ENCOUNTER — PATIENT MESSAGE (OUTPATIENT)
Dept: NEUROLOGY | Facility: CLINIC | Age: 42
End: 2022-04-05
Payer: COMMERCIAL

## 2022-04-05 LAB — SENSORY NEUROPATHY PROFILE: NORMAL

## 2022-04-06 ENCOUNTER — PATIENT MESSAGE (OUTPATIENT)
Dept: RHEUMATOLOGY | Facility: CLINIC | Age: 42
End: 2022-04-06
Payer: COMMERCIAL

## 2022-04-12 ENCOUNTER — PATIENT MESSAGE (OUTPATIENT)
Dept: NEUROLOGY | Facility: CLINIC | Age: 42
End: 2022-04-12
Payer: COMMERCIAL

## 2022-04-12 ENCOUNTER — TELEPHONE (OUTPATIENT)
Dept: NEUROLOGY | Facility: CLINIC | Age: 42
End: 2022-04-12
Payer: COMMERCIAL

## 2022-04-12 NOTE — TELEPHONE ENCOUNTER
----- Message from Priscilla Jaime sent at 4/12/2022 11:33 AM CDT -----  Regarding: Appt Request  Name of Who is Calling: REX GOLDSTEIN [[id number]]           What is the request in detail: Patient is requesting a call back to schedule an appointment. Please assist.           Can the clinic reply by [facility]: NO           What Number [facility] Back [facility]: [phone]

## 2022-04-29 ENCOUNTER — PATIENT MESSAGE (OUTPATIENT)
Dept: RHEUMATOLOGY | Facility: CLINIC | Age: 42
End: 2022-04-29
Payer: COMMERCIAL

## 2022-05-02 ENCOUNTER — TELEPHONE (OUTPATIENT)
Dept: FAMILY MEDICINE | Facility: CLINIC | Age: 42
End: 2022-05-02
Payer: COMMERCIAL

## 2022-05-03 ENCOUNTER — TELEPHONE (OUTPATIENT)
Dept: FAMILY MEDICINE | Facility: CLINIC | Age: 42
End: 2022-05-03

## 2022-05-03 ENCOUNTER — OFFICE VISIT (OUTPATIENT)
Dept: FAMILY MEDICINE | Facility: CLINIC | Age: 42
End: 2022-05-03
Payer: COMMERCIAL

## 2022-05-03 DIAGNOSIS — R10.13 EPIGASTRIC ABDOMINAL PAIN: Primary | ICD-10-CM

## 2022-05-03 PROCEDURE — 99213 OFFICE O/P EST LOW 20 MIN: CPT | Mod: 95,,, | Performed by: FAMILY MEDICINE

## 2022-05-03 PROCEDURE — 99213 PR OFFICE/OUTPT VISIT, EST, LEVL III, 20-29 MIN: ICD-10-PCS | Mod: 95,,, | Performed by: FAMILY MEDICINE

## 2022-05-03 PROCEDURE — 1159F PR MEDICATION LIST DOCUMENTED IN MEDICAL RECORD: ICD-10-PCS | Mod: CPTII,95,, | Performed by: FAMILY MEDICINE

## 2022-05-03 PROCEDURE — 1159F MED LIST DOCD IN RCRD: CPT | Mod: CPTII,95,, | Performed by: FAMILY MEDICINE

## 2022-05-03 PROCEDURE — 1160F RVW MEDS BY RX/DR IN RCRD: CPT | Mod: CPTII,95,, | Performed by: FAMILY MEDICINE

## 2022-05-03 PROCEDURE — 1160F PR REVIEW ALL MEDS BY PRESCRIBER/CLIN PHARMACIST DOCUMENTED: ICD-10-PCS | Mod: CPTII,95,, | Performed by: FAMILY MEDICINE

## 2022-05-03 RX ORDER — PANTOPRAZOLE SODIUM 40 MG/1
40 TABLET, DELAYED RELEASE ORAL DAILY
Qty: 30 TABLET | Refills: 11 | Status: SHIPPED | OUTPATIENT
Start: 2022-05-03 | End: 2022-08-30 | Stop reason: SDUPTHER

## 2022-05-03 RX ORDER — ONDANSETRON 4 MG/1
4 TABLET, ORALLY DISINTEGRATING ORAL EVERY 8 HOURS PRN
Qty: 30 TABLET | Refills: 11 | Status: SHIPPED | OUTPATIENT
Start: 2022-05-03 | End: 2023-02-27

## 2022-05-03 NOTE — PROGRESS NOTES
Assessment & Plan  Epigastric abdominal pain  -     pantoprazole (PROTONIX) 40 MG tablet; Take 1 tablet (40 mg total) by mouth once daily.  Dispense: 30 tablet; Refill: 11  -     ondansetron (ZOFRAN-ODT) 4 MG TbDL; Take 1 tablet (4 mg total) by mouth every 8 (eight) hours as needed (nausea).  Dispense: 30 tablet; Refill: 11  -     H. pylori antigen, stool; Future; Expected date: 05/03/2022  -     Ambulatory referral/consult to Gastroenterology; Future; Expected date: 05/10/2022      Discussed with patient the most likely etiology of her abdominal pain is either PUD, gastritis, or GERD caused by chronic NSAID use; H. Pylori is another possibility; also discussed the lesser likely possibility of gallbladder disease. Recommended patient to discuss possible alternative tx for psoriatic arthritis with her Rheumatologist. Will obtain H. Pylori stool test and then start Protonix qd prn and Zofran prn for nausea. Referral to GI for further evaluation, as she may need an EGD if symptoms do not improve.     The patient location is:  Patient Home   The chief complaint leading to consultation is: noted below  Visit type: Virtual visit with synchronous audio and video  Total time spent with patient: 10 minutes  Each patient to whom he or she provides medical services by telemedicine is:  (1) informed of the relationship between the physician and patient and the respective role of any other health care provider with respect to management of the patient; and (2) notified that he or she may decline to receive medical services by telemedicine and may withdraw from such care at any time.    Follow-up: Follow up if symptoms worsen or fail to improve.    ______________________________________________________________________    Chief Complaint  Chief Complaint   Patient presents with    Abdominal Pain       HPI  Dede Silver is a 41 y.o. female with multiple medical diagnoses as listed in the medical history and problem list that  "presents via a virtual visit c/o epigastric and LUQ abdominal pain that began 10 days ago. Pain is occurs about 20-30 minutes after eating foods, but denies specific dietary triggers. Pain is described as "discomfort" and "stabbing". Associated with nausea. Took Nexium with some mild relief. Patient has a history psoriatic arthritis for which she has taken meloxicam for quite some time and is currently taking sulfasalazine.    Answers for HPI/ROS submitted by the patient on 5/3/2022  Chronicity: new  Onset: in the past 7 days  Onset quality: gradual  Frequency: daily  Episode duration: 12 hours  Progression since onset: waxing and waning  Pain location: LUQ, epigastric region  Pain - numeric: 5/10  Pain quality: dull, a sensation of fullness  anorexia: Yes  belching: Yes  flatus: No  hematochezia: No  melena: No  Aggravated by: eating  Relieved by: belching  Pain severity: moderate  Treatments tried: proton pump inhibitors  Improvement on treatment: mild  abdominal surgery: No  colon cancer: No  Crohn's disease: No  gallstones: No  GERD: No  irritable bowel syndrome: No  kidney stones: No  pancreatitis: No  PUD: No  ulcerative colitis: No  UTI: No          PAST MEDICAL HISTORY:  Past Medical History:   Diagnosis Date    Arthritis     Fibromyalgia     Peripheral neuropathy 10/1/2021       PAST SURGICAL HISTORY:  History reviewed. No pertinent surgical history.    SOCIAL HISTORY:  Social History     Socioeconomic History    Marital status:    Tobacco Use    Smoking status: Never Smoker    Smokeless tobacco: Never Used   Substance and Sexual Activity    Alcohol use: No       FAMILY HISTORY:  Family History   Problem Relation Age of Onset    Melanoma Neg Hx        ALLERGIES AND MEDICATIONS: updated and reviewed.  Review of patient's allergies indicates:  No Known Allergies  Current Outpatient Medications   Medication Sig Dispense Refill    clobetasoL (TEMOVATE) 0.05 % external solution Apply topically 2 " (two) times daily. To dry scalp x 2 weeks for flares, then weekends only 50 mL 3    ketoconazole (NIZORAL) 2 % shampoo Apply topically twice a week. To scalp x 5 minutes, then rinse 120 mL 5    meloxicam (MOBIC) 15 MG tablet TAKE 1 TABLET(15 MG) BY MOUTH EVERY DAY 30 tablet 2    ondansetron (ZOFRAN-ODT) 4 MG TbDL Take 1 tablet (4 mg total) by mouth every 8 (eight) hours as needed (nausea). 30 tablet 11    pantoprazole (PROTONIX) 40 MG tablet Take 1 tablet (40 mg total) by mouth once daily. 30 tablet 11    sulfaSALAzine (AZULFIDINE) 500 mg Tab Take 3 tablets (1,500 mg total) by mouth 2 (two) times a day. 180 tablet 2     27 mg iron- 1 mg Tab Take 1 tablet by mouth once daily.       No current facility-administered medications for this visit.         ROS  Review of Systems   Constitutional: Negative for activity change, appetite change and fever.   Gastrointestinal: Positive for abdominal pain and nausea. Negative for constipation, diarrhea and vomiting.   Genitourinary: Negative for dysuria, frequency and hematuria.   Musculoskeletal: Positive for arthralgias. Negative for myalgias.   Neurological: Negative for headaches.           Physical Exam  Physical Exam  Constitutional:       General: She is not in acute distress.  HENT:      Head: Normocephalic and atraumatic.   Neurological:      Mental Status: She is alert. Mental status is at baseline.   Psychiatric:         Mood and Affect: Mood normal.         Behavior: Behavior normal.         *Physical exam limited by virtual visit.    Health Maintenance       Date Due Completion Date    Cervical Cancer Screening Never done ---    COVID-19 Vaccine (3 - Booster for Pfizer series) 03/25/2022 10/25/2021    Influenza Vaccine (Season Ended) 09/01/2022 12/28/2009    TETANUS VACCINE 04/01/2031 4/1/2021

## 2022-05-03 NOTE — TELEPHONE ENCOUNTER
Pt notified to come to clinic to  stool specimen kit. Directions of kit given to pt. Pt verbalized understanding.

## 2022-05-03 NOTE — TELEPHONE ENCOUNTER
----- Message from Monie Sandoval DO sent at 5/3/2022  7:52 AM CDT -----  Please call patient to  H. Pylori stool kit

## 2022-05-03 NOTE — TELEPHONE ENCOUNTER
Attempted to contact pt regarding message below. No answer, left VM instructing pt to contact clinic.

## 2022-05-04 ENCOUNTER — LAB VISIT (OUTPATIENT)
Dept: LAB | Facility: HOSPITAL | Age: 42
End: 2022-05-04
Attending: FAMILY MEDICINE
Payer: COMMERCIAL

## 2022-05-04 DIAGNOSIS — R10.13 EPIGASTRIC ABDOMINAL PAIN: ICD-10-CM

## 2022-05-04 PROCEDURE — 87338 HPYLORI STOOL AG IA: CPT | Performed by: FAMILY MEDICINE

## 2022-05-05 ENCOUNTER — TELEPHONE (OUTPATIENT)
Dept: FAMILY MEDICINE | Facility: CLINIC | Age: 42
End: 2022-05-05
Payer: COMMERCIAL

## 2022-05-11 ENCOUNTER — PATIENT MESSAGE (OUTPATIENT)
Dept: NEUROLOGY | Facility: CLINIC | Age: 42
End: 2022-05-11
Payer: COMMERCIAL

## 2022-05-11 LAB
AMPHIPHYSIN AB TITR SER: NEGATIVE TITER
CV2 IGG TITR SER: NEGATIVE TITER
GLIAL NUC TYPE 1 AB TITR SER: NEGATIVE TITER
HU1 AB TITR SER: NEGATIVE TITER
HU2 AB TITR SER IF: NEGATIVE TITER
HU3 AB TITR SER: NEGATIVE TITER
IMMUNOLOGIST REVIEW: NORMAL
NACHR AB SER-SCNC: NORMAL NMOL/ML
PAVAL REFLEX TEST ADDED: NORMAL
PCA-1 AB TITR SER: NEGATIVE TITER
PCA-TR AB TITR SER: NEGATIVE TITER
PURKINJE CELL CYTOPLASMIC AB TYPE2: NEGATIVE TITER
VGCC-P/Q BIND AB SER-SCNC: 0 NMOL/L
VGKC AB SER-SCNC: 0 NMOL/L

## 2022-05-12 LAB
H PYLORI AG STL QL IA: DETECTED
SPECIMEN SOURCE: ABNORMAL

## 2022-05-13 ENCOUNTER — PATIENT MESSAGE (OUTPATIENT)
Dept: FAMILY MEDICINE | Facility: CLINIC | Age: 42
End: 2022-05-13
Payer: COMMERCIAL

## 2022-05-13 DIAGNOSIS — A04.8 H. PYLORI INFECTION: Primary | ICD-10-CM

## 2022-05-13 RX ORDER — CLARITHROMYCIN 500 MG/1
500 TABLET, FILM COATED ORAL EVERY 12 HOURS
Qty: 28 TABLET | Refills: 0 | Status: SHIPPED | OUTPATIENT
Start: 2022-05-13 | End: 2022-05-27

## 2022-05-13 RX ORDER — AMOXICILLIN 500 MG/1
1000 TABLET, FILM COATED ORAL EVERY 12 HOURS
Qty: 56 TABLET | Refills: 0 | Status: SHIPPED | OUTPATIENT
Start: 2022-05-13 | End: 2022-05-27

## 2022-05-13 NOTE — TELEPHONE ENCOUNTER
Please inform patient of positive H. Pylori test. I sent in 2 weeks course of amoxicillin, clarithromycin, and she should take her Protonix twice a day during this time frame. We need to retest her stool 2 weeks after she has finished treatment. She can  stool kit and resubmit at that time. Keep follow up with GI in June.

## 2022-05-16 ENCOUNTER — PATIENT OUTREACH (OUTPATIENT)
Dept: ADMINISTRATIVE | Facility: OTHER | Age: 42
End: 2022-05-16
Payer: COMMERCIAL

## 2022-05-16 NOTE — PROGRESS NOTES
Health Maintenance Due   Topic Date Due    Cervical Cancer Screening  Never done    COVID-19 Vaccine (3 - Booster for Pfizer series) 03/25/2022     Updates were requested from care everywhere.  Chart was reviewed for overdue Proactive [facility] Encounters (JACK) topics (CRS, Breast Cancer Screening, Eye exam)  Health Maintenance has been updated.  [facility] immunization registry triggered.  Immunizations were reconciled.

## 2022-05-17 ENCOUNTER — OFFICE VISIT (OUTPATIENT)
Dept: GASTROENTEROLOGY | Facility: CLINIC | Age: 42
End: 2022-05-17
Payer: COMMERCIAL

## 2022-05-17 VITALS
DIASTOLIC BLOOD PRESSURE: 81 MMHG | WEIGHT: 214.63 LBS | HEART RATE: 85 BPM | SYSTOLIC BLOOD PRESSURE: 117 MMHG | BODY MASS INDEX: 38.01 KG/M2

## 2022-05-17 DIAGNOSIS — R10.13 EPIGASTRIC ABDOMINAL PAIN: ICD-10-CM

## 2022-05-17 DIAGNOSIS — A04.8 H. PYLORI INFECTION: Primary | ICD-10-CM

## 2022-05-17 PROCEDURE — 99999 PR PBB SHADOW E&M-EST. PATIENT-LVL IV: ICD-10-PCS | Mod: PBBFAC,,, | Performed by: NURSE PRACTITIONER

## 2022-05-17 PROCEDURE — 1160F PR REVIEW ALL MEDS BY PRESCRIBER/CLIN PHARMACIST DOCUMENTED: ICD-10-PCS | Mod: CPTII,S$GLB,, | Performed by: NURSE PRACTITIONER

## 2022-05-17 PROCEDURE — 1159F PR MEDICATION LIST DOCUMENTED IN MEDICAL RECORD: ICD-10-PCS | Mod: CPTII,S$GLB,, | Performed by: NURSE PRACTITIONER

## 2022-05-17 PROCEDURE — 99203 OFFICE O/P NEW LOW 30 MIN: CPT | Mod: S$GLB,,, | Performed by: NURSE PRACTITIONER

## 2022-05-17 PROCEDURE — 3079F DIAST BP 80-89 MM HG: CPT | Mod: CPTII,S$GLB,, | Performed by: NURSE PRACTITIONER

## 2022-05-17 PROCEDURE — 99203 PR OFFICE/OUTPT VISIT, NEW, LEVL III, 30-44 MIN: ICD-10-PCS | Mod: S$GLB,,, | Performed by: NURSE PRACTITIONER

## 2022-05-17 PROCEDURE — 3008F BODY MASS INDEX DOCD: CPT | Mod: CPTII,S$GLB,, | Performed by: NURSE PRACTITIONER

## 2022-05-17 PROCEDURE — 1160F RVW MEDS BY RX/DR IN RCRD: CPT | Mod: CPTII,S$GLB,, | Performed by: NURSE PRACTITIONER

## 2022-05-17 PROCEDURE — 3008F PR BODY MASS INDEX (BMI) DOCUMENTED: ICD-10-PCS | Mod: CPTII,S$GLB,, | Performed by: NURSE PRACTITIONER

## 2022-05-17 PROCEDURE — 3074F SYST BP LT 130 MM HG: CPT | Mod: CPTII,S$GLB,, | Performed by: NURSE PRACTITIONER

## 2022-05-17 PROCEDURE — 99999 PR PBB SHADOW E&M-EST. PATIENT-LVL IV: CPT | Mod: PBBFAC,,, | Performed by: NURSE PRACTITIONER

## 2022-05-17 PROCEDURE — 3074F PR MOST RECENT SYSTOLIC BLOOD PRESSURE < 130 MM HG: ICD-10-PCS | Mod: CPTII,S$GLB,, | Performed by: NURSE PRACTITIONER

## 2022-05-17 PROCEDURE — 3079F PR MOST RECENT DIASTOLIC BLOOD PRESSURE 80-89 MM HG: ICD-10-PCS | Mod: CPTII,S$GLB,, | Performed by: NURSE PRACTITIONER

## 2022-05-17 PROCEDURE — 1159F MED LIST DOCD IN RCRD: CPT | Mod: CPTII,S$GLB,, | Performed by: NURSE PRACTITIONER

## 2022-05-17 NOTE — PROGRESS NOTES
Subjective:       Patient ID: Dede Silver is a 41 y.o. female.    Chief Complaint: Abdominal Pain    42 y/o female referred by PCP for abdominal pain. Patient reports worsening abdominal pain and nausea for almost one month. Diagnosed with H. Pylori infection via stool antigen test on 5/12/2022. Patient states she was unaware of test results and did not start antibiotic therapy. Per chart review PCP sent patient msg via My Chart 5/13/2022 regarding treatment plan. Patient will start therapy today.     Abdominal Pain  This is a new problem. The current episode started more than 1 month ago. The problem occurs intermittently. The pain is located in the epigastric region. The quality of the pain is aching. Associated symptoms include nausea. Pertinent negatives include no constipation, diarrhea, fever, hematuria, melena or vomiting. The pain is aggravated by eating. The pain is relieved by nothing. She has tried proton pump inhibitors for the symptoms. The treatment provided mild relief.       Past Medical History:   Diagnosis Date    Arthritis     Fibromyalgia     Peripheral neuropathy 10/1/2021       History reviewed. No pertinent surgical history.    Family History   Problem Relation Age of Onset    Melanoma Neg Hx        Social History     Socioeconomic History    Marital status:    Tobacco Use    Smoking status: Never Smoker    Smokeless tobacco: Never Used   Substance and Sexual Activity    Alcohol use: No       Review of Systems   Constitutional: Negative for appetite change, fever and unexpected weight change.   HENT: Negative for trouble swallowing.    Respiratory: Negative for shortness of breath.    Cardiovascular: Negative for chest pain.   Gastrointestinal: Positive for abdominal pain and nausea. Negative for blood in stool, constipation, diarrhea, melena and vomiting.   Genitourinary: Negative for hematuria.   Neurological: Negative for dizziness and weakness.   Hematological: Negative for  adenopathy. Does not bruise/bleed easily.   Psychiatric/Behavioral: Negative for dysphoric mood.         Objective:     Vitals:    05/17/22 0842   BP: 117/81   BP Location: Right arm   Patient Position: Sitting   BP Method: Medium (Manual)   Pulse: 85   Weight: 97.3 kg (214 lb 9.6 oz)          Physical Exam  Constitutional:       General: She is not in acute distress.     Appearance: Normal appearance. She is not ill-appearing.   HENT:      Head: Normocephalic.   Eyes:      Conjunctiva/sclera: Conjunctivae normal.      Pupils: Pupils are equal, round, and reactive to light.   Pulmonary:      Effort: Pulmonary effort is normal. No respiratory distress.   Musculoskeletal:         General: Normal range of motion.      Cervical back: Normal range of motion.   Skin:     General: Skin is warm and dry.   Neurological:      Mental Status: She is alert and oriented to person, place, and time.   Psychiatric:         Mood and Affect: Mood normal.         Behavior: Behavior normal.               Assessment:         ICD-10-CM ICD-9-CM   1. H. pylori infection  A04.8 041.86   2. Epigastric abdominal pain  R10.13 789.06       Plan:       H. pylori infection; Epigastric abdominal pain  -     Complete Clarithromycin triple therapy as previously prescribed        -     Recommend test to confirm eradication at least four weeks         after completion of antibiotic treatment. PPIs should be held for         one to two weeks prior to testing to reduce false-negative         results.     Follow up in about 6 weeks (around 6/28/2022) for If symptoms worsen or fail to improve, medication management.     Patient's Medications   New Prescriptions    No medications on file   Previous Medications    AMOXICILLIN (AMOXIL) 500 MG TAB    Take 2 tablets (1,000 mg total) by mouth every 12 (twelve) hours. for 14 days    CLARITHROMYCIN (BIAXIN) 500 MG TABLET    Take 1 tablet (500 mg total) by mouth every 12 (twelve) hours. for 14 days    CLOBETASOL  (TEMOVATE) 0.05 % EXTERNAL SOLUTION    Apply topically 2 (two) times daily. To dry scalp x 2 weeks for flares, then weekends only    KETOCONAZOLE (NIZORAL) 2 % SHAMPOO    Apply topically twice a week. To scalp x 5 minutes, then rinse    MELOXICAM (MOBIC) 15 MG TABLET    TAKE 1 TABLET(15 MG) BY MOUTH EVERY DAY    ONDANSETRON (ZOFRAN-ODT) 4 MG TBDL    Take 1 tablet (4 mg total) by mouth every 8 (eight) hours as needed (nausea).    PANTOPRAZOLE (PROTONIX) 40 MG TABLET    Take 1 tablet (40 mg total) by mouth once daily.    SULFASALAZINE (AZULFIDINE) 500 MG TAB    Take 3 tablets (1,500 mg total) by mouth 2 (two) times a day.     27 MG IRON- 1 MG TAB    Take 1 tablet by mouth once daily.   Modified Medications    No medications on file   Discontinued Medications    No medications on file

## 2022-05-23 ENCOUNTER — PATIENT MESSAGE (OUTPATIENT)
Dept: NEUROLOGY | Facility: CLINIC | Age: 42
End: 2022-05-23
Payer: COMMERCIAL

## 2022-05-23 ENCOUNTER — PATIENT MESSAGE (OUTPATIENT)
Dept: RHEUMATOLOGY | Facility: CLINIC | Age: 42
End: 2022-05-23
Payer: COMMERCIAL

## 2022-05-23 DIAGNOSIS — L40.50 PSORIATIC ARTHRITIS: Primary | ICD-10-CM

## 2022-05-23 RX ORDER — CELECOXIB 200 MG/1
200 CAPSULE ORAL DAILY
Qty: 30 CAPSULE | Refills: 2 | Status: SHIPPED | OUTPATIENT
Start: 2022-05-23 | End: 2022-08-30

## 2022-05-30 ENCOUNTER — OFFICE VISIT (OUTPATIENT)
Dept: RHEUMATOLOGY | Facility: CLINIC | Age: 42
End: 2022-05-30
Payer: COMMERCIAL

## 2022-05-30 DIAGNOSIS — M06.4 INFLAMMATORY POLYARTHRITIS: ICD-10-CM

## 2022-05-30 DIAGNOSIS — L40.50 PSORIATIC ARTHRITIS: Primary | Chronic | ICD-10-CM

## 2022-05-30 PROCEDURE — 1160F PR REVIEW ALL MEDS BY PRESCRIBER/CLIN PHARMACIST DOCUMENTED: ICD-10-PCS | Mod: CPTII,95,, | Performed by: INTERNAL MEDICINE

## 2022-05-30 PROCEDURE — 99214 OFFICE O/P EST MOD 30 MIN: CPT | Mod: 95,,, | Performed by: INTERNAL MEDICINE

## 2022-05-30 PROCEDURE — 1159F PR MEDICATION LIST DOCUMENTED IN MEDICAL RECORD: ICD-10-PCS | Mod: CPTII,95,, | Performed by: INTERNAL MEDICINE

## 2022-05-30 PROCEDURE — 1160F RVW MEDS BY RX/DR IN RCRD: CPT | Mod: CPTII,95,, | Performed by: INTERNAL MEDICINE

## 2022-05-30 PROCEDURE — 99214 PR OFFICE/OUTPT VISIT, EST, LEVL IV, 30-39 MIN: ICD-10-PCS | Mod: 95,,, | Performed by: INTERNAL MEDICINE

## 2022-05-30 PROCEDURE — 1159F MED LIST DOCD IN RCRD: CPT | Mod: CPTII,95,, | Performed by: INTERNAL MEDICINE

## 2022-05-30 RX ORDER — PREDNISONE 10 MG/1
10-20 TABLET ORAL DAILY PRN
Qty: 20 TABLET | Refills: 0 | Status: SHIPPED | OUTPATIENT
Start: 2022-05-30 | End: 2023-02-27

## 2022-05-30 RX ORDER — HYDROXYCHLOROQUINE SULFATE 200 MG/1
200 TABLET, FILM COATED ORAL 2 TIMES DAILY
Qty: 60 TABLET | Refills: 5 | Status: SHIPPED | OUTPATIENT
Start: 2022-05-30 | End: 2022-06-29

## 2022-05-30 ASSESSMENT — ROUTINE ASSESSMENT OF PATIENT INDEX DATA (RAPID3)
PSYCHOLOGICAL DISTRESS SCORE: 0
MDHAQ FUNCTION SCORE: 0.3
WHEN YOU AWAKENED IN THE MORNING OVER THE LAST WEEK, PLEASE INDICATE THE AMOUNT OF TIME IT TAKES UNTIL YOU ARE AS LIMBER AS YOU WILL BE FOR THE DAY: 5 HOURS
PAIN SCORE: 5.5
TOTAL RAPID3 SCORE: 4.33
TOTAL RAPID3 SCORE: 4.33
PSYCHOLOGICAL DISTRESS SCORE: 0
AM STIFFNESS SCORE: 1, YES
FATIGUE SCORE: 7
FATIGUE SCORE: 7
PATIENT GLOBAL ASSESSMENT SCORE: 6.5
PATIENT GLOBAL ASSESSMENT SCORE: 6.5
MDHAQ FUNCTION SCORE: 0.3
PAIN SCORE: 5.5

## 2022-05-30 NOTE — ASSESSMENT & PLAN NOTE
Seen virtually today so I still have not verified that she has psoriasis but by hx still has sx of inflammatory arthritis; joana had parial improvement on SSZ but ongoing flare ups requiring prn NSAID and too much GI sx to take NSAID daily (which she says helped)    Impression possible progression of inflammatory arthritis    Rec:  Add  mg/d to SSZ ; may end up on triple Rx (SSZ, HCQ, MTX)  RTC me in person in  3 months so I can examine skin (or schedule with a woman derm MD if needed to do this)  Send info on anti-inflammatory diet    Repeat ESR, CRP, CBC tomorrow [place] to check inflammation and monitor CBC on SSZ  RTC me 3 mo with lab

## 2022-05-30 NOTE — PROGRESS NOTES
Rapid3 Question Responses and Scores 5/30/2022   MDHAQ Score 0.3   Psychologic Score 0   Pain Score 5.5   When you awakened in the morning OVER THE LAST WEEK, did you feel stiff? Yes   If Yes, please indicate the number of hours until you are as limber as you will be for the day 5   Fatigue Score 7   Global Health Score 6.5   RAPID3 Score 4.33     Answers for HPI/ROS submitted by the patient on 5/30/2022  fever: No  eye redness: No  mouth sores: No  headaches: No  shortness of breath: No  chest pain: No  trouble swallowing: No  diarrhea: No  constipation: No  unexpected weight change: No  genital sore: No  dysuria: No  During the last 3 days, have you had a skin rash?: No  Bruises or bleeds easily: No  cough: No

## 2022-05-30 NOTE — PATIENT INSTRUCTIONS
Add hydroxychloroquine 200 mg twice a day to sulfasalazine 1500 mg twice a day  ( [email or web address]://[email or web address]/I-Am-A/Patient-[email or web address]/Hydroxychloroquine-Plaquenil )    Dermatology consultation to verify possible psoriasis; if this is documented then other medicines may control symptoms better (e.g. Tremfya, Enbrel)    Learn more about anti-inflammatory diet:   Dr Khurram Henley, [facility]. Maintaining a Healthy Immune System: What you can do to help  [email or web address]_Booklet_[email or web address]  Also visit [email or web address]/anti-[email or web address]-[email or web address]

## 2022-05-30 NOTE — PROGRESS NOTES
Subjective:       Patient ID: Dede Silver is a 41 y.o. female.    Chief Complaint: Disease Management    HPI   The patient location is: [place]  The chief complaint leading to consultation is: arthritis mgt    Visit type: audiovisual (lost audio during visit)    Face to Face time with patient: 20  30 minutes of total time spent on the encounter, which includes face to face time and non-face to face time preparing to see the patient (eg, review of tests), Obtaining and/or reviewing separately obtained history, Documenting clinical information in the electronic or other health record, Independently interpreting results (not separately reported) and communicating results to the patient/family/caregiver, or Care coordination (not separately reported).         Each patient to whom he or she provides medical services by telemedicine is:  (1) informed of the relationship between the physician and patient and the respective role of any other health care provider with respect to management of the patient; and (2) notified that he or she may decline to receive medical services by telemedicine and may withdraw from such care at any time.    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 Right Dorsal Ulnar cutaneous neuropathy   2. Right Sural neuropathy   3. 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      Also saw hematology for kappa light chains 2021; normal CBC and other labs including CARMEN/RF/CCP     No family here    6 mo  to 17 y/o     Breastfeeding ; avoiding meds     March 2021 started SSZ; 1500 bid  After 1 mo of SSZ she felt it helped, but then more flare ups of pain    1st MP pain and swelling  Other MPS sometimes swollen   Sometimes pain ankle or knee but has not noted swelling; usually comes for 1-2 days then better  Difficulty walking when ankles and tendons and bottom of feet are tender    Took meloxicam which helped but stopped about 2 weeks ago due to stomach pain  Omeprazole did not help until stopped meloxicam  I gave celecoxib which she takes prn    Currently on clarithromicin and amoxicillin for helicobacter 3 weeks ago     Will travel to [place] end of July for 2 weeks.    Review of Systems   Constitutional: Negative for fever and unexpected weight change.   HENT: Negative for mouth sores and trouble swallowing.    Eyes: Negative for redness.   Respiratory: Negative for cough and shortness of breath.    Cardiovascular: Negative for chest pain.   Gastrointestinal: Negative for constipation and diarrhea.   Genitourinary: Negative for dysuria and genital sores.   Skin: Negative for rash.   Neurological: Negative for headaches.   Hematological: Does not bruise/bleed easily.       Rapid3 Question Responses and Scores 5/30/2022   MDHAQ Score 0.3   Psychologic Score 0   Pain Score 5.5   When you awakened in the morning OVER THE LAST WEEK, did you feel stiff? Yes   If Yes, please indicate the number of hours until you are as limber as you will be for the day 5   Fatigue Score 7   Global Health Score 6.5   RAPID3 Score 4.33        Objective:   There were no vitals taken for this visit.     Physical Exam      Assessment:       1. Psoriatic arthritis            Plan:       Problem List Items Addressed This Visit        Active Problems    Psoriatic arthritis - Primary (Chronic)     Seen virtually today so I still have not verified that she has psoriasis but by hx still has sx of inflammatory arthritis; joana had parial  improvement on SSZ but ongoing flare ups requiring prn NSAID and too much GI sx to take NSAID daily (which she says helped)    Impression possible progression of inflammatory arthritis    Rec:  Add  mg/d to SSZ ; may end up on triple Rx (SSZ, HCQ, MTX)  RTC me in person in  3 months so I can examine skin (or schedule with a woman derm MD if needed to do this)  Send info on anti-inflammatory diet    Repeat ESR, CRP, CBC tomorrow [place] to check inflammation and monitor CBC on SSZ  RTC me 3 mo with lab

## 2022-08-02 ENCOUNTER — OFFICE VISIT (OUTPATIENT)
Dept: DERMATOLOGY | Facility: CLINIC | Age: 42
End: 2022-08-02
Payer: COMMERCIAL

## 2022-08-02 DIAGNOSIS — L40.9 SCALP PSORIASIS: Primary | ICD-10-CM

## 2022-08-02 DIAGNOSIS — B08.4 HAND, FOOT AND MOUTH DISEASE (HFMD): ICD-10-CM

## 2022-08-02 PROCEDURE — 99214 PR OFFICE/OUTPT VISIT, EST, LEVL IV, 30-39 MIN: ICD-10-PCS | Mod: S$GLB,,, | Performed by: DERMATOLOGY

## 2022-08-02 PROCEDURE — 99999 PR PBB SHADOW E&M-EST. PATIENT-LVL III: ICD-10-PCS | Mod: PBBFAC,,, | Performed by: DERMATOLOGY

## 2022-08-02 PROCEDURE — 1159F MED LIST DOCD IN RCRD: CPT | Mod: CPTII,S$GLB,, | Performed by: DERMATOLOGY

## 2022-08-02 PROCEDURE — 99999 PR PBB SHADOW E&M-EST. PATIENT-LVL III: CPT | Mod: PBBFAC,,, | Performed by: DERMATOLOGY

## 2022-08-02 PROCEDURE — 99214 OFFICE O/P EST MOD 30 MIN: CPT | Mod: S$GLB,,, | Performed by: DERMATOLOGY

## 2022-08-02 PROCEDURE — 1160F RVW MEDS BY RX/DR IN RCRD: CPT | Mod: CPTII,S$GLB,, | Performed by: DERMATOLOGY

## 2022-08-02 PROCEDURE — 1160F PR REVIEW ALL MEDS BY PRESCRIBER/CLIN PHARMACIST DOCUMENTED: ICD-10-PCS | Mod: CPTII,S$GLB,, | Performed by: DERMATOLOGY

## 2022-08-02 PROCEDURE — 1159F PR MEDICATION LIST DOCUMENTED IN MEDICAL RECORD: ICD-10-PCS | Mod: CPTII,S$GLB,, | Performed by: DERMATOLOGY

## 2022-08-02 RX ORDER — HYDROXYZINE HYDROCHLORIDE 25 MG/1
25 TABLET, FILM COATED ORAL NIGHTLY
Qty: 30 TABLET | Refills: 2 | Status: SHIPPED | OUTPATIENT
Start: 2022-08-02 | End: 2022-09-01

## 2022-08-02 RX ORDER — CLOBETASOL PROPIONATE 0.46 MG/ML
SOLUTION TOPICAL 2 TIMES DAILY
Qty: 50 ML | Refills: 3 | Status: SHIPPED | OUTPATIENT
Start: 2022-08-02 | End: 2022-10-31

## 2022-08-02 RX ORDER — KETOCONAZOLE 20 MG/ML
SHAMPOO, SUSPENSION TOPICAL
Qty: 120 ML | Refills: 5 | Status: SHIPPED | OUTPATIENT
Start: 2022-08-04 | End: 2023-08-31 | Stop reason: SDUPTHER

## 2022-08-02 RX ORDER — TRIAMCINOLONE ACETONIDE 1 MG/G
CREAM TOPICAL 2 TIMES DAILY
Qty: 80 G | Refills: 1 | Status: SHIPPED | OUTPATIENT
Start: 2022-08-02 | End: 2022-10-28

## 2022-08-02 NOTE — PROGRESS NOTES
Subjective:       Patient ID:  Dede Silver is a 41 y.o. female who presents for   Chief Complaint   Patient presents with    Rash     Palms  Wrist  Soles of feet     Rash - Initial  Affected locations: right wrist, left wrist, right foot, left foot, right hand and left hand  Duration: 1 day  Signs / symptoms: itching  Severity: severe  Timing: constant  Aggravated by: picking, scratching and friction (hot water)  Relieving factors/Treatments tried: nothing  Improvement on treatment: no relief    hx of scalp psoriasis was given ketoconazole shampoo and clobetasol solution at last visit.  Seeing rheum for arthritis.  Scalp psoriasis is well controlled but still comes and goes.  Needs refills.    New rash started yesterday on palms and soles. Also with a crust on her lower lip and 2 shallow ulcerations on tip of her tongue.    Otherwise, tired, as she recently was in [place] and flew back yesterday. Denies fevers, chills, headache, myalgias.    She noticed the first spot of the rash start on the left palm about 1 week ago.    Kids not sick at home, no known sick contacts.  Itching is main complaint for this rash.      Review of Systems   Skin: Positive for itching and rash.        Objective:    Physical Exam   Constitutional: She appears well-developed and well-nourished. No distress.   HENT:   Mouth/Throat:       Neurological: She is alert and oriented to person, place, and time. She is not disoriented.   Psychiatric: She has a normal mood and affect.   Skin:   Areas Examined (abnormalities noted in diagram):   RLE Inspected  LLE Inspection Performed  Nails and Digits Inspection Performed              Diagram Legend     Erythematous scaling macule/papule c/w actinic keratosis       Vascular papule c/w angioma      Pigmented verrucoid papule/plaque c/w seborrheic keratosis      Yellow umbilicated papule c/w sebaceous hyperplasia      Irregularly shaped tan macule c/w lentigo     1-2 mm smooth white papules consistent  with Milia      Movable subcutaneous cyst with punctum c/w epidermal inclusion cyst      Subcutaneous movable cyst c/w pilar cyst      Firm pink to brown papule c/w dermatofibroma      Pedunculated fleshy papule(s) c/w skin tag(s)      Evenly pigmented macule c/w junctional nevus     Mildly variegated pigmented, slightly irregular-bordered macule c/w mildly atypical nevus      Flesh colored to evenly pigmented papule c/w intradermal nevus       Pink pearly papule/plaque c/w basal cell carcinoma      Erythematous hyperkeratotic cursted plaque c/w SCC      Surgical scar with no sign of skin cancer recurrence      Open and closed comedones      Inflammatory papules and pustules      Verrucoid papule consistent consistent with wart     Erythematous eczematous patches and plaques     Dystrophic onycholytic nail with subungual debris c/w onychomycosis     Umbilicated papule    Erythematous-base heme-crusted tan verrucoid plaque consistent with inflamed seborrheic keratosis     Erythematous Silvery Scaling Plaque c/w Psoriasis     See annotation      Assessment / Plan:        Scalp psoriasis  -     clobetasoL (TEMOVATE) 0.05 % external solution; Apply topically 2 (two) times daily. To dry scalp x 2 weeks for flares, then weekends only  Dispense: 50 mL; Refill: 3  -     ketoconazole (NIZORAL) 2 % shampoo; Apply topically twice a week. To scalp x 5 minutes, then rinse  Dispense: 120 mL; Refill: 5    Hand, foot and mouth disease (HFMD)  Vs other viral exanthem - palms/soles/mouth-  anticipate may get worse before getting better as just began yesterday - prednisone is an option if need be - pt will let me know.    -     triamcinolone acetonide 0.1% (KENALOG) 0.1 % cream; Apply topically 2 (two) times daily. To rash on hands and feet until resolved  Dispense: 80 g; Refill: 1  -     hydrOXYzine HCL (ATARAX) 25 MG tablet; Take 1 tablet (25 mg total) by mouth every evening. Prn pruritus. Do not drive or operate machinery while  taking this medicine.  Dispense: 30 tablet; Refill: 2             Follow up if symptoms worsen or fail to improve.

## 2022-08-03 DIAGNOSIS — L40.50 PSORIATIC ARTHRITIS: Chronic | ICD-10-CM

## 2022-08-04 RX ORDER — SULFASALAZINE 500 MG/1
TABLET ORAL
Qty: 180 TABLET | Refills: 0 | Status: SHIPPED | OUTPATIENT
Start: 2022-08-04 | End: 2022-08-08 | Stop reason: SDUPTHER

## 2022-08-08 ENCOUNTER — LAB VISIT (OUTPATIENT)
Dept: LAB | Facility: HOSPITAL | Age: 42
End: 2022-08-08
Attending: INTERNAL MEDICINE
Payer: COMMERCIAL

## 2022-08-08 ENCOUNTER — PATIENT MESSAGE (OUTPATIENT)
Dept: DERMATOLOGY | Facility: CLINIC | Age: 42
End: 2022-08-08
Payer: COMMERCIAL

## 2022-08-08 ENCOUNTER — PATIENT MESSAGE (OUTPATIENT)
Dept: RHEUMATOLOGY | Facility: CLINIC | Age: 42
End: 2022-08-08
Payer: COMMERCIAL

## 2022-08-08 DIAGNOSIS — L40.50 PSORIATIC ARTHRITIS: Chronic | ICD-10-CM

## 2022-08-08 DIAGNOSIS — Z79.899 HIGH RISK MEDICATION USE: ICD-10-CM

## 2022-08-08 LAB
ALBUMIN SERPL BCP-MCNC: 4.3 G/DL (ref 3.5–5.2)
ALP SERPL-CCNC: 80 U/L (ref 55–135)
ALT SERPL W/O P-5'-P-CCNC: 13 U/L (ref 10–44)
ANION GAP SERPL CALC-SCNC: 10 MMOL/L (ref 8–16)
AST SERPL-CCNC: 14 U/L (ref 10–40)
BASOPHILS # BLD AUTO: 0.05 K/UL (ref 0–0.2)
BASOPHILS NFR BLD: 0.7 % (ref 0–1.9)
BILIRUB SERPL-MCNC: 0.3 MG/DL (ref 0.1–1)
BUN SERPL-MCNC: 10 MG/DL (ref 6–20)
CALCIUM SERPL-MCNC: 9.5 MG/DL (ref 8.7–10.5)
CHLORIDE SERPL-SCNC: 100 MMOL/L (ref 95–110)
CO2 SERPL-SCNC: 26 MMOL/L (ref 23–29)
CREAT SERPL-MCNC: 0.7 MG/DL (ref 0.5–1.4)
CRP SERPL-MCNC: 4.4 MG/L (ref 0–8.2)
DIFFERENTIAL METHOD: ABNORMAL
EOSINOPHIL # BLD AUTO: 0.5 K/UL (ref 0–0.5)
EOSINOPHIL NFR BLD: 7.1 % (ref 0–8)
ERYTHROCYTE [DISTWIDTH] IN BLOOD BY AUTOMATED COUNT: 12.5 % (ref 11.5–14.5)
ERYTHROCYTE [SEDIMENTATION RATE] IN BLOOD BY PHOTOMETRIC METHOD: 7 MM/HR (ref 0–36)
EST. GFR  (NO RACE VARIABLE): >60 ML/MIN/1.73 M^2
GLUCOSE SERPL-MCNC: 101 MG/DL (ref 70–110)
HCT VFR BLD AUTO: 36.9 % (ref 37–48.5)
HGB BLD-MCNC: 12.1 G/DL (ref 12–16)
IMM GRANULOCYTES # BLD AUTO: 0.02 K/UL (ref 0–0.04)
IMM GRANULOCYTES NFR BLD AUTO: 0.3 % (ref 0–0.5)
LYMPHOCYTES # BLD AUTO: 2.4 K/UL (ref 1–4.8)
LYMPHOCYTES NFR BLD: 33.9 % (ref 18–48)
MCH RBC QN AUTO: 30 PG (ref 27–31)
MCHC RBC AUTO-ENTMCNC: 32.8 G/DL (ref 32–36)
MCV RBC AUTO: 91 FL (ref 82–98)
MONOCYTES # BLD AUTO: 0.5 K/UL (ref 0.3–1)
MONOCYTES NFR BLD: 6.5 % (ref 4–15)
NEUTROPHILS # BLD AUTO: 3.7 K/UL (ref 1.8–7.7)
NEUTROPHILS NFR BLD: 51.5 % (ref 38–73)
NRBC BLD-RTO: 0 /100 WBC
PLATELET # BLD AUTO: 272 K/UL (ref 150–450)
PMV BLD AUTO: 10.1 FL (ref 9.2–12.9)
POTASSIUM SERPL-SCNC: 4.1 MMOL/L (ref 3.5–5.1)
PROT SERPL-MCNC: 7.7 G/DL (ref 6–8.4)
RBC # BLD AUTO: 4.04 M/UL (ref 4–5.4)
SODIUM SERPL-SCNC: 136 MMOL/L (ref 136–145)
WBC # BLD AUTO: 7.09 K/UL (ref 3.9–12.7)

## 2022-08-08 PROCEDURE — 85652 RBC SED RATE AUTOMATED: CPT | Performed by: INTERNAL MEDICINE

## 2022-08-08 PROCEDURE — 36415 COLL VENOUS BLD VENIPUNCTURE: CPT | Mod: PO | Performed by: INTERNAL MEDICINE

## 2022-08-08 PROCEDURE — 80053 COMPREHEN METABOLIC PANEL: CPT | Performed by: INTERNAL MEDICINE

## 2022-08-08 PROCEDURE — 86140 C-REACTIVE PROTEIN: CPT | Performed by: INTERNAL MEDICINE

## 2022-08-08 PROCEDURE — 85025 COMPLETE CBC W/AUTO DIFF WBC: CPT | Performed by: INTERNAL MEDICINE

## 2022-08-08 RX ORDER — SULFASALAZINE 500 MG/1
TABLET ORAL
Qty: 180 TABLET | Refills: 2 | Status: SHIPPED | OUTPATIENT
Start: 2022-08-08 | End: 2022-08-30 | Stop reason: SDUPTHER

## 2022-08-08 RX ORDER — SULFASALAZINE 500 MG/1
TABLET ORAL
Qty: 180 TABLET | Refills: 0 | Status: CANCELLED | OUTPATIENT
Start: 2022-08-08

## 2022-08-11 ENCOUNTER — PATIENT MESSAGE (OUTPATIENT)
Dept: DERMATOLOGY | Facility: CLINIC | Age: 42
End: 2022-08-11
Payer: COMMERCIAL

## 2022-08-30 ENCOUNTER — OFFICE VISIT (OUTPATIENT)
Dept: RHEUMATOLOGY | Facility: CLINIC | Age: 42
End: 2022-08-30
Payer: COMMERCIAL

## 2022-08-30 DIAGNOSIS — R10.13 EPIGASTRIC ABDOMINAL PAIN: ICD-10-CM

## 2022-08-30 DIAGNOSIS — Z79.899 HIGH RISK MEDICATION USE: ICD-10-CM

## 2022-08-30 DIAGNOSIS — L40.50 PSORIATIC ARTHRITIS: Primary | Chronic | ICD-10-CM

## 2022-08-30 PROCEDURE — 99214 PR OFFICE/OUTPT VISIT, EST, LEVL IV, 30-39 MIN: ICD-10-PCS | Mod: 95,,, | Performed by: INTERNAL MEDICINE

## 2022-08-30 PROCEDURE — 1159F PR MEDICATION LIST DOCUMENTED IN MEDICAL RECORD: ICD-10-PCS | Mod: CPTII,95,, | Performed by: INTERNAL MEDICINE

## 2022-08-30 PROCEDURE — 99214 OFFICE O/P EST MOD 30 MIN: CPT | Mod: 95,,, | Performed by: INTERNAL MEDICINE

## 2022-08-30 PROCEDURE — 1160F RVW MEDS BY RX/DR IN RCRD: CPT | Mod: CPTII,95,, | Performed by: INTERNAL MEDICINE

## 2022-08-30 PROCEDURE — 1160F PR REVIEW ALL MEDS BY PRESCRIBER/CLIN PHARMACIST DOCUMENTED: ICD-10-PCS | Mod: CPTII,95,, | Performed by: INTERNAL MEDICINE

## 2022-08-30 PROCEDURE — 1159F MED LIST DOCD IN RCRD: CPT | Mod: CPTII,95,, | Performed by: INTERNAL MEDICINE

## 2022-08-30 RX ORDER — SULFASALAZINE 500 MG/1
TABLET ORAL
Qty: 180 TABLET | Refills: 2 | Status: SHIPPED | OUTPATIENT
Start: 2022-08-30 | End: 2022-09-06

## 2022-08-30 RX ORDER — HYDROXYCHLOROQUINE SULFATE 200 MG/1
200 TABLET, FILM COATED ORAL 2 TIMES DAILY
Qty: 60 TABLET | Refills: 5 | Status: SHIPPED | OUTPATIENT
Start: 2022-08-30 | End: 2023-02-24

## 2022-08-30 RX ORDER — PANTOPRAZOLE SODIUM 40 MG/1
40 TABLET, DELAYED RELEASE ORAL DAILY
Qty: 30 TABLET | Refills: 11 | Status: SHIPPED | OUTPATIENT
Start: 2022-08-30 | End: 2023-02-27

## 2022-08-30 ASSESSMENT — ROUTINE ASSESSMENT OF PATIENT INDEX DATA (RAPID3)
TOTAL RAPID3 SCORE: 2.22
PATIENT GLOBAL ASSESSMENT SCORE: 3.5
MDHAQ FUNCTION SCORE: 0.2
PSYCHOLOGICAL DISTRESS SCORE: 0
PAIN SCORE: 2.5
FATIGUE SCORE: 2.5

## 2022-08-30 NOTE — PROGRESS NOTES
Answers submitted by the patient for this visit:  Rheumatology Questionnaire (Submitted on 8/30/2022)  fever: No  eye redness: No  mouth sores: No  headaches: No  shortness of breath: No  chest pain: No  trouble swallowing: No  diarrhea: No  constipation: No  unexpected weight change: No  genital sore: No  dysuria: No  During the last 3 days, have you had a skin rash?: No  Bruises or bleeds easily: No  cough: No

## 2022-08-30 NOTE — ASSESSMENT & PLAN NOTE
Clinically improved subjectively with addition of HCQ to SSZ   No AE to meds though recently had an unusual rash on hands and feet after travel to [place]    Take occasional Celebrex prn    Will refill meds and cont dual SSZ/HCQ Rx of inflammatory arthritis, likely psoriatic arthritis    Repeat lab in 3 and 6 mo for SSZ  RTC me 6 mo

## 2022-08-30 NOTE — PROGRESS NOTES
Subjective:       Patient ID: Dede Silver is a 41 y.o. female.    Chief Complaint: Disease Management    HPI  The patient location is: [place]  The chief complaint leading to consultation is: PsA mgt    Visit type: audiovisual    Face to Face time with patient: 20  30 minutes of total time spent on the encounter, which includes face to face time and non-face to face time preparing to see the patient (eg, review of tests), Obtaining and/or reviewing separately obtained history, Documenting clinical information in the electronic or other health record, Independently interpreting results (not separately reported) and communicating results to the patient/family/caregiver, or Care coordination (not separately reported).         Each patient to whom he or she provides medical services by telemedicine is:  (1) informed of the relationship between the physician and patient and the respective role of any other health care provider with respect to management of the patient; and (2) notified that he or she may decline to receive medical services by telemedicine and may withdraw from such care at any time.    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 Right Dorsal Ulnar cutaneous neuropathy   2. Right Sural neuropathy   3. 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    [facility] May 2022: HCQ added to SSZ  She says that arthritis has improved since adding HCQ to SSZ  Almost no fatigue and minimal joint pain    She went to [place] in July  When got back she developed rash and itching in thumb and both feet   Derm gave topical triamcinolone ; suspected hand/foot/mouth disease; itching resolved after one week; skin lesions desquamated  No other skin lesions at this time    Review of Systems   Constitutional:  Negative for fever and unexpected weight change.   HENT:  Negative for mouth sores and trouble swallowing.    Eyes:  Negative for redness.   Respiratory:  Negative for cough and shortness of breath.    Cardiovascular:  Negative for chest pain.   Gastrointestinal:  Negative for constipation and diarrhea.   Genitourinary:  Negative for dysuria and genital sores.   Skin:  Negative for rash.   Neurological:  Negative for headaches.   Hematological:  Does not bruise/bleed easily.         Rapid3 Question Responses and Scores 8/30/2022   MDHAQ Score 0.2   Psychologic Score 0   Pain Score 2.5   When you awakened in the morning OVER THE LAST WEEK, did you feel stiff? No   If Yes, please indicate the number of hours until you are as limber as you will be for the day 1   Fatigue Score 2.5   Global Health Score 3.5   RAPID3 Score 2.22      Objective:   There were no vitals taken for this visit.     Physical Exam      Assessment:       1. Psoriatic arthritis    2. Epigastric abdominal pain    3. High risk medication use            Plan:       Problem List Items Addressed This Visit          Active Problems    Psoriatic arthritis - Primary (Chronic)     Clinically improved subjectively with addition of HCQ to SSZ   No AE to meds though recently had an unusual rash on hands and feet after travel to [place]    Take occasional Celebrex prn    Will refill meds and cont  dual SSZ/HCQ Rx of inflammatory arthritis, likely psoriatic arthritis    Repeat lab in 3 and 6 mo for SSZ  RTC me 6 mo         Relevant Medications    sulfaSALAzine (AZULFIDINE) 500 mg Tab    hydrOXYchloroQUINE (PLAQUENIL) 200 mg tablet    High risk medication use     No lab AE to meds  Will need to plan eye checks annually while on HCQ           Other Visit Diagnoses       Epigastric abdominal pain        Relevant Medications    pantoprazole (PROTONIX) 40 MG tablet

## 2022-10-05 DIAGNOSIS — Z12.31 OTHER SCREENING MAMMOGRAM: ICD-10-CM

## 2022-10-06 ENCOUNTER — PATIENT MESSAGE (OUTPATIENT)
Dept: RHEUMATOLOGY | Facility: CLINIC | Age: 42
End: 2022-10-06
Payer: COMMERCIAL

## 2022-10-11 ENCOUNTER — PATIENT MESSAGE (OUTPATIENT)
Dept: ADMINISTRATIVE | Facility: HOSPITAL | Age: 42
End: 2022-10-11
Payer: COMMERCIAL

## 2022-10-28 ENCOUNTER — OFFICE VISIT (OUTPATIENT)
Dept: RHEUMATOLOGY | Facility: CLINIC | Age: 42
End: 2022-10-28
Payer: COMMERCIAL

## 2022-10-28 DIAGNOSIS — Z79.899 HIGH RISK MEDICATION USE: ICD-10-CM

## 2022-10-28 DIAGNOSIS — L40.50 PSORIATIC ARTHRITIS: Primary | Chronic | ICD-10-CM

## 2022-10-28 DIAGNOSIS — L40.9 PSORIASIS OF SCALP: ICD-10-CM

## 2022-10-28 PROCEDURE — 99214 OFFICE O/P EST MOD 30 MIN: CPT | Mod: 95,,, | Performed by: INTERNAL MEDICINE

## 2022-10-28 PROCEDURE — 1160F RVW MEDS BY RX/DR IN RCRD: CPT | Mod: CPTII,95,, | Performed by: INTERNAL MEDICINE

## 2022-10-28 PROCEDURE — 1159F MED LIST DOCD IN RCRD: CPT | Mod: CPTII,95,, | Performed by: INTERNAL MEDICINE

## 2022-10-28 PROCEDURE — 1159F PR MEDICATION LIST DOCUMENTED IN MEDICAL RECORD: ICD-10-PCS | Mod: CPTII,95,, | Performed by: INTERNAL MEDICINE

## 2022-10-28 PROCEDURE — 1160F PR REVIEW ALL MEDS BY PRESCRIBER/CLIN PHARMACIST DOCUMENTED: ICD-10-PCS | Mod: CPTII,95,, | Performed by: INTERNAL MEDICINE

## 2022-10-28 PROCEDURE — 99214 PR OFFICE/OUTPT VISIT, EST, LEVL IV, 30-39 MIN: ICD-10-PCS | Mod: 95,,, | Performed by: INTERNAL MEDICINE

## 2022-10-28 RX ORDER — FOLIC ACID 1 MG/1
1 TABLET ORAL DAILY
Qty: 90 TABLET | Refills: 3 | Status: SHIPPED | OUTPATIENT
Start: 2022-10-28 | End: 2023-08-31 | Stop reason: SDUPTHER

## 2022-10-28 RX ORDER — METHOTREXATE 2.5 MG/1
12.5 TABLET ORAL
Qty: 20 TABLET | Refills: 2 | Status: SHIPPED | OUTPATIENT
Start: 2022-10-28 | End: 2022-10-31

## 2022-10-28 NOTE — ASSESSMENT & PLAN NOTE
Symptoms have worsened and also c/o hyperpigmentation likely due to hydroxychloroquine   Discussed traditional DMARD vs biologic  She is Not planning further children  Will stop hydroxychloroquine and add methotrexate 12.5 mg/week and folic acid 1 mg/d  Will continue sulfasalazine for now  Has lab scheduled in Nov and Feb  Needs f/u visit in January or Feb to assess response to mtx

## 2022-10-28 NOTE — PROGRESS NOTES
Subjective:       Patient ID: Dede Silver is a 42 y.o. female.    Chief Complaint: Disease Management    HPI  The patient location is: home  The chief complaint leading to consultation is: Arth mgt    Visit type: audiovisual    Face to Face time with patient: 20  30 minutes of total time spent on the encounter, which includes face to face time and non-face to face time preparing to see the patient (eg, review of tests), Obtaining and/or reviewing separately obtained history, Documenting clinical information in the electronic or other health record, Independently interpreting results (not separately reported) and communicating results to the patient/family/caregiver, or Care coordination (not separately reported).         Each patient to whom he or she provides medical services by telemedicine is:  (1) informed of the relationship between the physician and patient and the respective role of any other health care provider with respect to management of the patient; and (2) notified that he or she may decline to receive medical services by telemedicine and may withdraw from such care at any time.    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 Right Dorsal Ulnar cutaneous neuropathy   2. Right Sural neuropathy   3. 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     [facility] Aug 30 2022: reported some improvement after HCQ added to sulfasalazine in May    Still with fatigue and pain and intermittent flare up of pain  Pain R thigh when lies on that side  C/o brown pigment on cheeks  Describes pain upper lateral anterior thigh    Has had psoriasis of scalp    Review of Systems   Constitutional:  Negative for fever and unexpected weight change.   HENT:  Negative for mouth sores and trouble swallowing.    Eyes:  Negative for redness.   Respiratory:  Negative for cough and shortness of breath.    Cardiovascular:  Negative for chest pain.   Gastrointestinal:  Negative for constipation and diarrhea.   Genitourinary:  Negative for dysuria and genital sores.   Skin:  Negative for rash.   Neurological:  Negative for headaches.   Hematological:  Does not bruise/bleed easily.       Rapid3 Question Responses and Scores 10/28/2022   MDHAQ Score 0.2   Psychologic Score 0   Pain Score 6.5   When you awakened in the morning OVER THE LAST WEEK, did you feel stiff? Yes   If Yes, please indicate the number of hours until you are as limber as you will be for the day 3   Fatigue Score 7   Global Health Score 6.5   RAPID3 Score 4.56      Objective:   There were no vitals taken for this visit.     Physical Exam      Assessment:       1. Psoriatic arthritis    2. Psoriasis of scalp    3. High risk medication use            Plan:       Problem List Items Addressed This Visit          Active Problems    Psoriatic arthritis - Primary (Chronic)     Symptoms have worsened and also c/o hyperpigmentation likely due to hydroxychloroquine   Discussed traditional DMARD vs biologic  She is Not planning further children  Will stop hydroxychloroquine and add methotrexate 12.5 mg/week and folic acid 1 mg/d  Will continue sulfasalazine for now  Has lab scheduled in Nov and Feb  Needs f/u  visit in January or Feb to assess response to mtx         Relevant Medications    methotrexate 2.5 MG Tab    High risk medication use     Hyperpigmentation likely due to hydroxychloroquine    No issues with sulfasalazine    Not planning more children and willing to take methotrexate          Relevant Medications    folic acid (FOLVITE) 1 MG tablet    Psoriasis of scalp    Relevant Medications    methotrexate 2.5 MG Tab   o

## 2022-10-28 NOTE — PROGRESS NOTES
Rapid3 Question Responses and Scores 10/28/2022   MDHAQ Score 0.2   Psychologic Score 0   Pain Score 6.5   When you awakened in the morning OVER THE LAST WEEK, did you feel stiff? Yes   If Yes, please indicate the number of hours until you are as limber as you will be for the day 3   Fatigue Score 7   Global Health Score 6.5   RAPID3 Score 4.56     Answers submitted by the patient for this visit:  Rheumatology Questionnaire (Submitted on 10/28/2022)  fever: No  eye redness: No  mouth sores: No  headaches: No  shortness of breath: No  chest pain: No  trouble swallowing: No  diarrhea: No  constipation: No  unexpected weight change: No  genital sore: No  dysuria: No  During the last 3 days, have you had a skin rash?: No  Bruises or bleeds easily: No  cough: No

## 2022-10-28 NOTE — ASSESSMENT & PLAN NOTE
Hyperpigmentation likely due to hydroxychloroquine    No issues with sulfasalazine    Not planning more children and willing to take methotrexate

## 2022-11-30 ENCOUNTER — LAB VISIT (OUTPATIENT)
Dept: LAB | Facility: HOSPITAL | Age: 42
End: 2022-11-30
Attending: INTERNAL MEDICINE
Payer: COMMERCIAL

## 2022-11-30 DIAGNOSIS — L40.50 PSORIATIC ARTHRITIS: Chronic | ICD-10-CM

## 2022-11-30 DIAGNOSIS — Z79.899 HIGH RISK MEDICATION USE: ICD-10-CM

## 2022-11-30 LAB
ALBUMIN SERPL BCP-MCNC: 4.4 G/DL (ref 3.5–5.2)
ALP SERPL-CCNC: 56 U/L (ref 55–135)
ALT SERPL W/O P-5'-P-CCNC: 9 U/L (ref 10–44)
ANION GAP SERPL CALC-SCNC: 8 MMOL/L (ref 8–16)
AST SERPL-CCNC: 12 U/L (ref 10–40)
BASOPHILS # BLD AUTO: 0.03 K/UL (ref 0–0.2)
BASOPHILS NFR BLD: 0.4 % (ref 0–1.9)
BILIRUB SERPL-MCNC: 0.7 MG/DL (ref 0.1–1)
BUN SERPL-MCNC: 14 MG/DL (ref 6–20)
CALCIUM SERPL-MCNC: 9.8 MG/DL (ref 8.7–10.5)
CHLORIDE SERPL-SCNC: 104 MMOL/L (ref 95–110)
CO2 SERPL-SCNC: 26 MMOL/L (ref 23–29)
CREAT SERPL-MCNC: 0.7 MG/DL (ref 0.5–1.4)
CRP SERPL-MCNC: 1.7 MG/L (ref 0–8.2)
DIFFERENTIAL METHOD: ABNORMAL
EOSINOPHIL # BLD AUTO: 0.1 K/UL (ref 0–0.5)
EOSINOPHIL NFR BLD: 1.6 % (ref 0–8)
ERYTHROCYTE [DISTWIDTH] IN BLOOD BY AUTOMATED COUNT: 13.4 % (ref 11.5–14.5)
ERYTHROCYTE [SEDIMENTATION RATE] IN BLOOD BY PHOTOMETRIC METHOD: 10 MM/HR (ref 0–36)
EST. GFR  (NO RACE VARIABLE): >60 ML/MIN/1.73 M^2
GLUCOSE SERPL-MCNC: 90 MG/DL (ref 70–110)
HCT VFR BLD AUTO: 37.3 % (ref 37–48.5)
HGB BLD-MCNC: 11.7 G/DL (ref 12–16)
IMM GRANULOCYTES # BLD AUTO: 0.02 K/UL (ref 0–0.04)
IMM GRANULOCYTES NFR BLD AUTO: 0.3 % (ref 0–0.5)
LYMPHOCYTES # BLD AUTO: 2.4 K/UL (ref 1–4.8)
LYMPHOCYTES NFR BLD: 35 % (ref 18–48)
MCH RBC QN AUTO: 31.5 PG (ref 27–31)
MCHC RBC AUTO-ENTMCNC: 31.4 G/DL (ref 32–36)
MCV RBC AUTO: 101 FL (ref 82–98)
MONOCYTES # BLD AUTO: 0.4 K/UL (ref 0.3–1)
MONOCYTES NFR BLD: 6.3 % (ref 4–15)
NEUTROPHILS # BLD AUTO: 3.8 K/UL (ref 1.8–7.7)
NEUTROPHILS NFR BLD: 56.4 % (ref 38–73)
NRBC BLD-RTO: 0 /100 WBC
PLATELET # BLD AUTO: 247 K/UL (ref 150–450)
PMV BLD AUTO: 10.4 FL (ref 9.2–12.9)
POTASSIUM SERPL-SCNC: 4.5 MMOL/L (ref 3.5–5.1)
PROT SERPL-MCNC: 7.5 G/DL (ref 6–8.4)
RBC # BLD AUTO: 3.71 M/UL (ref 4–5.4)
SODIUM SERPL-SCNC: 138 MMOL/L (ref 136–145)
WBC # BLD AUTO: 6.71 K/UL (ref 3.9–12.7)

## 2022-11-30 PROCEDURE — 85025 COMPLETE CBC W/AUTO DIFF WBC: CPT | Performed by: INTERNAL MEDICINE

## 2022-11-30 PROCEDURE — 36415 COLL VENOUS BLD VENIPUNCTURE: CPT | Mod: PO | Performed by: INTERNAL MEDICINE

## 2022-11-30 PROCEDURE — 86140 C-REACTIVE PROTEIN: CPT | Performed by: INTERNAL MEDICINE

## 2022-11-30 PROCEDURE — 85652 RBC SED RATE AUTOMATED: CPT | Performed by: INTERNAL MEDICINE

## 2022-11-30 PROCEDURE — 80053 COMPREHEN METABOLIC PANEL: CPT | Performed by: INTERNAL MEDICINE

## 2022-12-08 DIAGNOSIS — L40.50 PSORIATIC ARTHRITIS: ICD-10-CM

## 2022-12-08 RX ORDER — SULFASALAZINE 500 MG/1
1500 TABLET ORAL 2 TIMES DAILY
Qty: 180 TABLET | Refills: 2 | Status: SHIPPED | OUTPATIENT
Start: 2022-12-08 | End: 2023-02-27 | Stop reason: SDUPTHER

## 2022-12-08 RX ORDER — CELECOXIB 200 MG/1
200 CAPSULE ORAL DAILY PRN
Qty: 30 CAPSULE | Refills: 2 | Status: SHIPPED | OUTPATIENT
Start: 2022-12-08 | End: 2023-02-07

## 2022-12-28 ENCOUNTER — PATIENT MESSAGE (OUTPATIENT)
Dept: RHEUMATOLOGY | Facility: CLINIC | Age: 42
End: 2022-12-28
Payer: COMMERCIAL

## 2022-12-28 DIAGNOSIS — Z79.899 HIGH RISK MEDICATION USE: Primary | ICD-10-CM

## 2023-01-04 ENCOUNTER — LAB VISIT (OUTPATIENT)
Dept: LAB | Facility: HOSPITAL | Age: 43
End: 2023-01-04
Attending: INTERNAL MEDICINE
Payer: COMMERCIAL

## 2023-01-04 DIAGNOSIS — Z79.899 HIGH RISK MEDICATION USE: ICD-10-CM

## 2023-01-04 LAB
ALBUMIN SERPL BCP-MCNC: 4.3 G/DL (ref 3.5–5.2)
ALP SERPL-CCNC: 66 U/L (ref 55–135)
ALT SERPL W/O P-5'-P-CCNC: 8 U/L (ref 10–44)
AST SERPL-CCNC: 11 U/L (ref 10–40)
BILIRUB DIRECT SERPL-MCNC: 0.2 MG/DL (ref 0.1–0.3)
BILIRUB SERPL-MCNC: 0.5 MG/DL (ref 0.1–1)
PROT SERPL-MCNC: 7.4 G/DL (ref 6–8.4)

## 2023-01-04 PROCEDURE — 36415 COLL VENOUS BLD VENIPUNCTURE: CPT | Mod: PO | Performed by: INTERNAL MEDICINE

## 2023-01-04 PROCEDURE — 80076 HEPATIC FUNCTION PANEL: CPT | Performed by: INTERNAL MEDICINE

## 2023-01-04 PROCEDURE — 85025 COMPLETE CBC W/AUTO DIFF WBC: CPT | Performed by: INTERNAL MEDICINE

## 2023-01-05 LAB
BASOPHILS # BLD AUTO: 0.03 K/UL (ref 0–0.2)
BASOPHILS NFR BLD: 0.4 % (ref 0–1.9)
DIFFERENTIAL METHOD: ABNORMAL
EOSINOPHIL # BLD AUTO: 0.1 K/UL (ref 0–0.5)
EOSINOPHIL NFR BLD: 1 % (ref 0–8)
ERYTHROCYTE [DISTWIDTH] IN BLOOD BY AUTOMATED COUNT: 14.3 % (ref 11.5–14.5)
HCT VFR BLD AUTO: 34.9 % (ref 37–48.5)
HGB BLD-MCNC: 11 G/DL (ref 12–16)
IMM GRANULOCYTES # BLD AUTO: 0.02 K/UL (ref 0–0.04)
IMM GRANULOCYTES NFR BLD AUTO: 0.3 % (ref 0–0.5)
LYMPHOCYTES # BLD AUTO: 2.1 K/UL (ref 1–4.8)
LYMPHOCYTES NFR BLD: 27.1 % (ref 18–48)
MCH RBC QN AUTO: 31.5 PG (ref 27–31)
MCHC RBC AUTO-ENTMCNC: 31.5 G/DL (ref 32–36)
MCV RBC AUTO: 100 FL (ref 82–98)
MONOCYTES # BLD AUTO: 0.4 K/UL (ref 0.3–1)
MONOCYTES NFR BLD: 5.1 % (ref 4–15)
NEUTROPHILS # BLD AUTO: 5.2 K/UL (ref 1.8–7.7)
NEUTROPHILS NFR BLD: 66.1 % (ref 38–73)
NRBC BLD-RTO: 0 /100 WBC
PLATELET # BLD AUTO: 219 K/UL (ref 150–450)
PMV BLD AUTO: 10.6 FL (ref 9.2–12.9)
RBC # BLD AUTO: 3.49 M/UL (ref 4–5.4)
WBC # BLD AUTO: 7.8 K/UL (ref 3.9–12.7)

## 2023-01-09 ENCOUNTER — PATIENT MESSAGE (OUTPATIENT)
Dept: RHEUMATOLOGY | Facility: CLINIC | Age: 43
End: 2023-01-09
Payer: COMMERCIAL

## 2023-01-09 DIAGNOSIS — L40.50 PSORIATIC ARTHRITIS: Chronic | ICD-10-CM

## 2023-01-09 DIAGNOSIS — L40.9 PSORIASIS OF SCALP: ICD-10-CM

## 2023-01-09 DIAGNOSIS — L40.50 PSORIATIC ARTHRITIS: ICD-10-CM

## 2023-01-09 RX ORDER — METHOTREXATE 2.5 MG/1
20 TABLET ORAL
Qty: 40 TABLET | Refills: 2 | Status: SHIPPED | OUTPATIENT
Start: 2023-01-09 | End: 2023-02-27 | Stop reason: SDUPTHER

## 2023-02-22 ENCOUNTER — LAB VISIT (OUTPATIENT)
Dept: LAB | Facility: HOSPITAL | Age: 43
End: 2023-02-22
Attending: INTERNAL MEDICINE
Payer: COMMERCIAL

## 2023-02-22 DIAGNOSIS — Z79.899 HIGH RISK MEDICATION USE: ICD-10-CM

## 2023-02-22 DIAGNOSIS — L40.50 PSORIATIC ARTHRITIS: Chronic | ICD-10-CM

## 2023-02-22 LAB
ALBUMIN SERPL BCP-MCNC: 3.9 G/DL (ref 3.5–5.2)
ALP SERPL-CCNC: 55 U/L (ref 55–135)
ALT SERPL W/O P-5'-P-CCNC: 9 U/L (ref 10–44)
ANION GAP SERPL CALC-SCNC: 7 MMOL/L (ref 8–16)
AST SERPL-CCNC: 9 U/L (ref 10–40)
BASOPHILS # BLD AUTO: 0.04 K/UL (ref 0–0.2)
BASOPHILS NFR BLD: 0.6 % (ref 0–1.9)
BILIRUB SERPL-MCNC: 0.4 MG/DL (ref 0.1–1)
BUN SERPL-MCNC: 14 MG/DL (ref 6–20)
CALCIUM SERPL-MCNC: 9.3 MG/DL (ref 8.7–10.5)
CHLORIDE SERPL-SCNC: 105 MMOL/L (ref 95–110)
CO2 SERPL-SCNC: 25 MMOL/L (ref 23–29)
CREAT SERPL-MCNC: 0.8 MG/DL (ref 0.5–1.4)
CRP SERPL-MCNC: 2.4 MG/L (ref 0–8.2)
DIFFERENTIAL METHOD: ABNORMAL
EOSINOPHIL # BLD AUTO: 0.1 K/UL (ref 0–0.5)
EOSINOPHIL NFR BLD: 1 % (ref 0–8)
ERYTHROCYTE [DISTWIDTH] IN BLOOD BY AUTOMATED COUNT: 13.7 % (ref 11.5–14.5)
ERYTHROCYTE [SEDIMENTATION RATE] IN BLOOD BY PHOTOMETRIC METHOD: 17 MM/HR (ref 0–36)
EST. GFR  (NO RACE VARIABLE): >60 ML/MIN/1.73 M^2
GLUCOSE SERPL-MCNC: 95 MG/DL (ref 70–110)
HCT VFR BLD AUTO: 35.6 % (ref 37–48.5)
HGB BLD-MCNC: 10.9 G/DL (ref 12–16)
IMM GRANULOCYTES # BLD AUTO: 0.02 K/UL (ref 0–0.04)
IMM GRANULOCYTES NFR BLD AUTO: 0.3 % (ref 0–0.5)
LYMPHOCYTES # BLD AUTO: 2 K/UL (ref 1–4.8)
LYMPHOCYTES NFR BLD: 29.7 % (ref 18–48)
MCH RBC QN AUTO: 29.4 PG (ref 27–31)
MCHC RBC AUTO-ENTMCNC: 30.6 G/DL (ref 32–36)
MCV RBC AUTO: 96 FL (ref 82–98)
MONOCYTES # BLD AUTO: 0.4 K/UL (ref 0.3–1)
MONOCYTES NFR BLD: 6.6 % (ref 4–15)
NEUTROPHILS # BLD AUTO: 4.2 K/UL (ref 1.8–7.7)
NEUTROPHILS NFR BLD: 61.8 % (ref 38–73)
NRBC BLD-RTO: 0 /100 WBC
PLATELET # BLD AUTO: 245 K/UL (ref 150–450)
PMV BLD AUTO: 10.4 FL (ref 9.2–12.9)
POTASSIUM SERPL-SCNC: 4.3 MMOL/L (ref 3.5–5.1)
PROT SERPL-MCNC: 7.2 G/DL (ref 6–8.4)
RBC # BLD AUTO: 3.71 M/UL (ref 4–5.4)
SODIUM SERPL-SCNC: 137 MMOL/L (ref 136–145)
WBC # BLD AUTO: 6.71 K/UL (ref 3.9–12.7)

## 2023-02-22 PROCEDURE — 85652 RBC SED RATE AUTOMATED: CPT | Performed by: INTERNAL MEDICINE

## 2023-02-22 PROCEDURE — 86140 C-REACTIVE PROTEIN: CPT | Performed by: INTERNAL MEDICINE

## 2023-02-22 PROCEDURE — 85025 COMPLETE CBC W/AUTO DIFF WBC: CPT | Performed by: INTERNAL MEDICINE

## 2023-02-22 PROCEDURE — 80053 COMPREHEN METABOLIC PANEL: CPT | Performed by: INTERNAL MEDICINE

## 2023-02-22 PROCEDURE — 36415 COLL VENOUS BLD VENIPUNCTURE: CPT | Mod: PO | Performed by: INTERNAL MEDICINE

## 2023-02-27 ENCOUNTER — OFFICE VISIT (OUTPATIENT)
Dept: RHEUMATOLOGY | Facility: CLINIC | Age: 43
End: 2023-02-27
Payer: COMMERCIAL

## 2023-02-27 DIAGNOSIS — L40.50 PSORIATIC ARTHRITIS: Primary | Chronic | ICD-10-CM

## 2023-02-27 DIAGNOSIS — Z79.899 HIGH RISK MEDICATION USE: ICD-10-CM

## 2023-02-27 DIAGNOSIS — R10.13 EPIGASTRIC ABDOMINAL PAIN: ICD-10-CM

## 2023-02-27 DIAGNOSIS — L40.9 PSORIASIS OF SCALP: ICD-10-CM

## 2023-02-27 DIAGNOSIS — Z79.1 NSAID LONG-TERM USE: ICD-10-CM

## 2023-02-27 PROCEDURE — 99214 OFFICE O/P EST MOD 30 MIN: CPT | Mod: 95,,, | Performed by: INTERNAL MEDICINE

## 2023-02-27 PROCEDURE — 99214 PR OFFICE/OUTPT VISIT, EST, LEVL IV, 30-39 MIN: ICD-10-PCS | Mod: 95,,, | Performed by: INTERNAL MEDICINE

## 2023-02-27 PROCEDURE — 1159F MED LIST DOCD IN RCRD: CPT | Mod: CPTII,95,, | Performed by: INTERNAL MEDICINE

## 2023-02-27 PROCEDURE — 1160F PR REVIEW ALL MEDS BY PRESCRIBER/CLIN PHARMACIST DOCUMENTED: ICD-10-PCS | Mod: CPTII,95,, | Performed by: INTERNAL MEDICINE

## 2023-02-27 PROCEDURE — 1159F PR MEDICATION LIST DOCUMENTED IN MEDICAL RECORD: ICD-10-PCS | Mod: CPTII,95,, | Performed by: INTERNAL MEDICINE

## 2023-02-27 PROCEDURE — 1160F RVW MEDS BY RX/DR IN RCRD: CPT | Mod: CPTII,95,, | Performed by: INTERNAL MEDICINE

## 2023-02-27 RX ORDER — PANTOPRAZOLE SODIUM 40 MG/1
40 TABLET, DELAYED RELEASE ORAL DAILY
Qty: 30 TABLET | Refills: 11 | Status: SHIPPED | OUTPATIENT
Start: 2023-02-27 | End: 2024-03-17

## 2023-02-27 RX ORDER — ONDANSETRON 4 MG/1
4 TABLET, ORALLY DISINTEGRATING ORAL EVERY 8 HOURS PRN
Qty: 30 TABLET | Refills: 11 | Status: CANCELLED | OUTPATIENT
Start: 2023-02-27

## 2023-02-27 RX ORDER — METHOTREXATE 2.5 MG/1
20 TABLET ORAL
Qty: 40 TABLET | Refills: 2 | Status: SHIPPED | OUTPATIENT
Start: 2023-02-27 | End: 2023-06-30 | Stop reason: SDUPTHER

## 2023-02-27 RX ORDER — CELECOXIB 200 MG/1
200 CAPSULE ORAL DAILY PRN
Qty: 30 CAPSULE | Refills: 2 | Status: SHIPPED | OUTPATIENT
Start: 2023-02-27 | End: 2023-06-07 | Stop reason: SDUPTHER

## 2023-02-27 RX ORDER — SULFASALAZINE 500 MG/1
1500 TABLET ORAL 2 TIMES DAILY
Qty: 180 TABLET | Refills: 2 | Status: SHIPPED | OUTPATIENT
Start: 2023-02-27 | End: 2023-05-01 | Stop reason: SDUPTHER

## 2023-02-27 NOTE — ASSESSMENT & PLAN NOTE
No treatment related adverse effects; will continue to monitor for drug toxicity with lab q3m    Re-emphasized importance of folic acid    Advise also iron supplement for anemia  Continue ppi while on daily celebrex

## 2023-02-27 NOTE — ASSESSMENT & PLAN NOTE
Psoriatic arthritis now improved on combination of methotrexate plus sulfasalazine and Celebrex; still some symptoms but much improved    Labs ok so will continue this for now  Repeat lab and RTC me 3 mo

## 2023-02-27 NOTE — PROGRESS NOTES
Subjective:       Patient ID: Dede Silver is a 42 y.o. female.    Chief Complaint: Disease Management    The patient location is: home/ [place]  The chief complaint leading to consultation is: psoriatic arthritis     Visit type: audiovisual    Face to Face time with patient: 20  25  minutes of total time spent on the encounter, which includes face to face time and non-face to face time preparing to see the patient (eg, review of tests), Obtaining and/or reviewing separately obtained history, Documenting clinical information in the electronic or other health record, Independently interpreting results (not separately reported) and communicating results to the patient/family/caregiver, or Care coordination (not separately reported).         Each patient to whom he or she provides medical services by telemedicine is:  (1) informed of the relationship between the physician and patient and the respective role of any other health care provider with respect to management of the patient; and (2) notified that he or she may decline to receive medical services by telemedicine and may withdraw from such care at any time.    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 Right Dorsal Ulnar cutaneous neuropathy   2. Right Sural neuropathy   3. 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      Also saw hematology for kappa light chains 2021; normal CBC and other labs including CARMEN/RF/CCP     No family here    6 mo to 15 y/o       2021 started SSZ; 1500 bid  May 2022 HCQ added  Oct 2022 stopped  hydroxychloroquine and added methotrexate  Celebrex also      Swelling improved since last visit in October 2022  Able to make a fist now  Morning stiffness much improved    Some nausea on methotrexate but not bad  Some loss of appetite but not bad yet  Lost 35 lb with healthier , low carb diet    A lot of headache in January but better in February    Now major issue is walking; can walk 10 minutes or 15 minutes then anterior thigh pain    Review of Systems   Constitutional:  Negative for fever and unexpected weight change.   HENT:  Negative for mouth sores and trouble swallowing.         No Dry mouth   Eyes:  Negative for redness.        No Dry eyes   Respiratory:  Negative for cough and shortness of breath.    Cardiovascular:  Negative for chest pain.   Gastrointestinal:  Negative for abdominal pain, constipation and diarrhea.   Genitourinary:  Negative for dysuria and genital sores.   Skin:  Negative for rash.   Neurological:  Negative for headaches.   Hematological:  Negative for adenopathy. Does not bruise/bleed easily.   Psychiatric/Behavioral:  Negative for sleep disturbance.        Rapid3 Question Responses and Scores 2/27/2023   MDHAQ Score 0.6   Psychologic Score 0   Pain Score 3   When you awakened in the morning OVER THE LAST WEEK, did you feel stiff? No   If Yes, please indicate the number of hours until you are as limber as you will be for the day -   Fatigue Score 4   Global Health Score 4   RAPID3 Score 3      Objective:   There were no vitals taken for this visit.     Physical Exam      Assessment:       1. Psoriatic arthritis    2. Epigastric abdominal pain    3. Psoriasis of scalp    4. High risk medication use              Plan:       Problem List Items Addressed This Visit          Active Problems    Psoriatic arthritis - Primary (Chronic)    Relevant Medications    methotrexate 2.5 MG Tab    sulfaSALAzine (AZULFIDINE) 500 mg Tab    celecoxib (CELEBREX) 200 MG capsule    Other Relevant Orders     Ambulatory referral/consult to Physical/Occupational Therapy    Sedimentation rate    C-Reactive Protein    High risk medication use    Relevant Orders    Comprehensive Metabolic Panel    CBC Auto Differential    Psoriasis of scalp    Relevant Medications    methotrexate 2.5 MG Tab     Other Visit Diagnoses       Epigastric abdominal pain

## 2023-03-03 ENCOUNTER — OFFICE VISIT (OUTPATIENT)
Dept: FAMILY MEDICINE | Facility: CLINIC | Age: 43
End: 2023-03-03
Payer: COMMERCIAL

## 2023-03-03 VITALS
DIASTOLIC BLOOD PRESSURE: 60 MMHG | TEMPERATURE: 98 F | HEART RATE: 90 BPM | SYSTOLIC BLOOD PRESSURE: 120 MMHG | BODY MASS INDEX: 34.91 KG/M2 | OXYGEN SATURATION: 96 % | HEIGHT: 63 IN | WEIGHT: 197.06 LBS

## 2023-03-03 DIAGNOSIS — Z79.899 DRUG-INDUCED IMMUNODEFICIENCY: ICD-10-CM

## 2023-03-03 DIAGNOSIS — L40.50 PSORIATIC ARTHRITIS: Chronic | ICD-10-CM

## 2023-03-03 DIAGNOSIS — M25.551 RIGHT HIP PAIN: ICD-10-CM

## 2023-03-03 DIAGNOSIS — E55.9 VITAMIN D DEFICIENCY: ICD-10-CM

## 2023-03-03 DIAGNOSIS — D84.821 DRUG-INDUCED IMMUNODEFICIENCY: ICD-10-CM

## 2023-03-03 DIAGNOSIS — Z12.31 ENCOUNTER FOR SCREENING MAMMOGRAM FOR MALIGNANT NEOPLASM OF BREAST: ICD-10-CM

## 2023-03-03 DIAGNOSIS — Z00.00 ROUTINE MEDICAL EXAM: Primary | ICD-10-CM

## 2023-03-03 PROCEDURE — 3078F PR MOST RECENT DIASTOLIC BLOOD PRESSURE < 80 MM HG: ICD-10-PCS | Mod: CPTII,S$GLB,, | Performed by: NURSE PRACTITIONER

## 2023-03-03 PROCEDURE — 1160F RVW MEDS BY RX/DR IN RCRD: CPT | Mod: CPTII,S$GLB,, | Performed by: NURSE PRACTITIONER

## 2023-03-03 PROCEDURE — 1159F PR MEDICATION LIST DOCUMENTED IN MEDICAL RECORD: ICD-10-PCS | Mod: CPTII,S$GLB,, | Performed by: NURSE PRACTITIONER

## 2023-03-03 PROCEDURE — 1160F PR REVIEW ALL MEDS BY PRESCRIBER/CLIN PHARMACIST DOCUMENTED: ICD-10-PCS | Mod: CPTII,S$GLB,, | Performed by: NURSE PRACTITIONER

## 2023-03-03 PROCEDURE — 99396 PR PREVENTIVE VISIT,EST,40-64: ICD-10-PCS | Mod: S$GLB,,, | Performed by: NURSE PRACTITIONER

## 2023-03-03 PROCEDURE — 99396 PREV VISIT EST AGE 40-64: CPT | Mod: S$GLB,,, | Performed by: NURSE PRACTITIONER

## 2023-03-03 PROCEDURE — 99999 PR PBB SHADOW E&M-EST. PATIENT-LVL IV: CPT | Mod: PBBFAC,,, | Performed by: NURSE PRACTITIONER

## 2023-03-03 PROCEDURE — 3008F BODY MASS INDEX DOCD: CPT | Mod: CPTII,S$GLB,, | Performed by: NURSE PRACTITIONER

## 2023-03-03 PROCEDURE — 3078F DIAST BP <80 MM HG: CPT | Mod: CPTII,S$GLB,, | Performed by: NURSE PRACTITIONER

## 2023-03-03 PROCEDURE — 3008F PR BODY MASS INDEX (BMI) DOCUMENTED: ICD-10-PCS | Mod: CPTII,S$GLB,, | Performed by: NURSE PRACTITIONER

## 2023-03-03 PROCEDURE — 99999 PR PBB SHADOW E&M-EST. PATIENT-LVL IV: ICD-10-PCS | Mod: PBBFAC,,, | Performed by: NURSE PRACTITIONER

## 2023-03-03 PROCEDURE — 3074F PR MOST RECENT SYSTOLIC BLOOD PRESSURE < 130 MM HG: ICD-10-PCS | Mod: CPTII,S$GLB,, | Performed by: NURSE PRACTITIONER

## 2023-03-03 PROCEDURE — 3074F SYST BP LT 130 MM HG: CPT | Mod: CPTII,S$GLB,, | Performed by: NURSE PRACTITIONER

## 2023-03-03 PROCEDURE — 1159F MED LIST DOCD IN RCRD: CPT | Mod: CPTII,S$GLB,, | Performed by: NURSE PRACTITIONER

## 2023-03-03 NOTE — PROGRESS NOTES
History of Present Illness   Dede Silver is a 42 y.o. woman with medical history as listed below who presents today for routine physical exam. It has been >1 year since her last physical. She is taking medications as prescribed without perceived SE. Complaints today: right hip pain located in groin that occurs with walking 10 minutes or greater not relieved with Tylenol. Her rheumatologist recently referred her to PT for this. She has no additional complaints. Pertinent negatives as listed in ROS. We will address HM today.    Past Medical History:   Diagnosis Date    Arthritis     Fibromyalgia     Peripheral neuropathy 10/1/2021       History reviewed. No pertinent surgical history.    Social History     Socioeconomic History    Marital status:    Tobacco Use    Smoking status: Never    Smokeless tobacco: Never   Substance and Sexual Activity    Alcohol use: No       Family History   Problem Relation Age of Onset    Melanoma Neg Hx        Review of Systems  Review of Systems   Constitutional:  Negative for malaise/fatigue and weight loss.   HENT:  Negative for hearing loss and tinnitus.    Eyes:  Negative for blurred vision and double vision.   Respiratory:  Negative for shortness of breath and wheezing.    Cardiovascular:  Negative for chest pain, palpitations, orthopnea, claudication and leg swelling.   Gastrointestinal:  Negative for blood in stool and melena.   Genitourinary:  Negative for flank pain and hematuria.   Musculoskeletal:  Positive for back pain and joint pain (right hip). Negative for neck pain.   Skin:  Negative for itching and rash.   Neurological:  Negative for dizziness, loss of consciousness and headaches.   Endo/Heme/Allergies:  Negative for polydipsia.   Psychiatric/Behavioral:  Negative for depression. The patient is not nervous/anxious and does not have insomnia.      A complete review of systems was otherwise negative.    Physical Exam  /60   Pulse 90   Temp 97.6 °F (36.4  "°C)   Ht 5' 3" (1.6 m)   Wt 89.4 kg (197 lb 1.5 oz)   SpO2 96%   BMI 34.91 kg/m²   General appearance: alert, appears stated age, cooperative, and no distress  Head: Normocephalic, without obvious abnormality, atraumatic  Eyes: negative findings: lids and lashes normal and conjunctivae and sclerae normal  Ears: normal TM's and external ear canals both ears  Nose: Nares normal. Septum midline. Mucosa normal. No drainage or sinus tenderness.  Throat: lips, mucosa, and tongue normal; teeth and gums normal  Neck: no carotid bruit, no JVD, supple, symmetrical, trachea midline, and thyroid not enlarged, symmetric, no tenderness/mass/nodules  Back: symmetric, no curvature. ROM normal. No CVA tenderness.  Lungs: clear to auscultation bilaterally  Heart: regular rate and rhythm, S1, S2 normal, no murmur, click, rub or gallop  Abdomen: soft, non-tender; bowel sounds normal; no masses,  no organomegaly  Extremities: extremities normal, atraumatic, no cyanosis or edema  Pulses: 2+ and symmetric  Skin: Skin color, texture, turgor normal. No rashes or lesions  Lymph nodes: Cervical, supraclavicular, and axillary nodes normal.  Neurologic: Grossly normal  Musculoskeletal: right hip exhibits FROM with no TTP, shortening, rotation, crepitus, or other abnormality    Assessment/Plan  Routine medical exam  Age appropriate screening recommendations and HM were discussed and updated.  Discussed importance of heart healthy diet and exercise.  Labs as below.  Follow-up as scheduled to establish care with PCP.  -     Lipid Panel; Future; Expected date: 03/03/2023  -     Vitamin D; Future; Expected date: 03/03/2023    Psoriatic arthritis  The current medical regimen is effective;  continue present plan and medications.  Followed by rheumatology.    Vitamin D deficiency  The current medical regimen is effective;  continue present plan and medications.  -     Vitamin D; Future; Expected date: 03/03/2023    Right hip pain  Proceed with " x-ray to rule out avascular necrosis.  Proceed with plan for physical therapy.  -     X-Ray Hip 2 or 3 views Right (with Pelvis when performed); Future; Expected date: 03/03/2023    Drug-induced immunodeficiency  The current medical regimen is effective;  continue present plan and medications.    Encounter for screening mammogram for malignant neoplasm of breast  Routine screening.  -     Mammo Digital Screening Bilat; Future; Expected date: 03/03/2023    Patient has verbalized understanding and is in agreement with plan of care.    Follow up for as scheduled to establish care with PCP.

## 2023-03-21 ENCOUNTER — HOSPITAL ENCOUNTER (OUTPATIENT)
Dept: RADIOLOGY | Facility: HOSPITAL | Age: 43
Discharge: HOME | End: 2023-03-21
Attending: NURSE PRACTITIONER
Payer: COMMERCIAL

## 2023-03-21 DIAGNOSIS — Z12.31 ENCOUNTER FOR SCREENING MAMMOGRAM FOR MALIGNANT NEOPLASM OF BREAST: ICD-10-CM

## 2023-03-21 DIAGNOSIS — M25.551 RIGHT HIP PAIN: ICD-10-CM

## 2023-03-21 PROCEDURE — 77063 MAMMO DIGITAL SCREENING BILAT WITH TOMO: ICD-10-PCS | Mod: 26,,, | Performed by: RADIOLOGY

## 2023-03-21 PROCEDURE — 73502 XR HIP WITH PELVIS WHEN PERFORMED, 2 OR 3  VIEWS RIGHT: ICD-10-PCS | Mod: 26,RT,, | Performed by: RADIOLOGY

## 2023-03-21 PROCEDURE — 77063 BREAST TOMOSYNTHESIS BI: CPT | Mod: 26,,, | Performed by: RADIOLOGY

## 2023-03-21 PROCEDURE — 73502 X-RAY EXAM HIP UNI 2-3 VIEWS: CPT | Mod: TC,FY,PO,RT

## 2023-03-21 PROCEDURE — 77067 SCR MAMMO BI INCL CAD: CPT | Mod: 26,,, | Performed by: RADIOLOGY

## 2023-03-21 PROCEDURE — 73502 X-RAY EXAM HIP UNI 2-3 VIEWS: CPT | Mod: 26,RT,, | Performed by: RADIOLOGY

## 2023-03-21 PROCEDURE — 77067 MAMMO DIGITAL SCREENING BILAT WITH TOMO: ICD-10-PCS | Mod: 26,,, | Performed by: RADIOLOGY

## 2023-03-21 PROCEDURE — 77067 SCR MAMMO BI INCL CAD: CPT | Mod: TC,PO

## 2023-04-18 DIAGNOSIS — L40.50 PSORIATIC ARTHRITIS: Chronic | ICD-10-CM

## 2023-04-18 RX ORDER — SULFASALAZINE 500 MG/1
1500 TABLET ORAL 2 TIMES DAILY
Qty: 180 TABLET | Refills: 2 | Status: CANCELLED | OUTPATIENT
Start: 2023-04-18

## 2023-05-01 ENCOUNTER — PATIENT MESSAGE (OUTPATIENT)
Dept: RHEUMATOLOGY | Facility: CLINIC | Age: 43
End: 2023-05-01
Payer: COMMERCIAL

## 2023-05-01 DIAGNOSIS — L40.50 PSORIATIC ARTHRITIS: Chronic | ICD-10-CM

## 2023-05-01 RX ORDER — SULFASALAZINE 500 MG/1
1500 TABLET ORAL 2 TIMES DAILY
Qty: 180 TABLET | Refills: 2 | Status: SHIPPED | OUTPATIENT
Start: 2023-05-01 | End: 2023-07-24 | Stop reason: SDUPTHER

## 2023-05-03 ENCOUNTER — CLINICAL SUPPORT (OUTPATIENT)
Dept: REHABILITATION | Facility: HOSPITAL | Age: 43
End: 2023-05-03
Attending: INTERNAL MEDICINE
Payer: COMMERCIAL

## 2023-05-03 DIAGNOSIS — M25.552 BILATERAL HIP PAIN: ICD-10-CM

## 2023-05-03 DIAGNOSIS — L40.50 PSORIATIC ARTHRITIS: Chronic | ICD-10-CM

## 2023-05-03 DIAGNOSIS — R29.898 WEAKNESS OF BOTH LOWER EXTREMITIES: ICD-10-CM

## 2023-05-03 DIAGNOSIS — M25.551 BILATERAL HIP PAIN: ICD-10-CM

## 2023-05-03 PROCEDURE — 97161 PT EVAL LOW COMPLEX 20 MIN: CPT | Mod: PN

## 2023-05-03 PROCEDURE — 97110 THERAPEUTIC EXERCISES: CPT | Mod: PN

## 2023-05-03 NOTE — PATIENT INSTRUCTIONS
"Hamstring Stretch (Seated)    Sit on a raised flat surface where you can prop your affected leg up on it such as a treatment table, couch or bed.       While keeping your knee straight to slightly bent, slowly lean forward and reach your hands towards your foot until a gentle stretch is felt along the back of your knee/thigh. Hold for 15 seconds and then return to starting position and repeat 3 times on each leg, twice daily.       Straight Leg Raise     While lying on your back, flex your foot towards your nose, squeeze your quad and raise up your leg with a straight knee, KEEPING YOUR KNEE AS STRAIGHT AS POSSIBLE.  Keep the opposite knee bent with the foot planted on the ground.  Repeat 5 times left leg per set. Do 5 sets per session for a total of 25 times. Slowly working your way up to performing 10 sets, 3 times, for a total of 30 repetitions. Do 2 sessions per day.       Gluteal Bridges      While lying on your back with knees bent, tighten your lower abdominal muscles, squeeze your buttocks and then raise your buttocks off the floor/bed as creating a "Bridge" with your body. Hold and then lower yourself and repeat.    Repeat 3 sets of 10 repetitions for a total of 30 times.     Sidelying Clamshells    While lying on your side with your knees bent and an elastic band wrapped around your knees, draw up the top knee while keeping contact of your feet together as shown.     Do not let your pelvis roll back during the lifting movement.      Repeat 3 sets of 10 repetitions for a total of 30 times on each leg.       Long Arc Quads    Attach a looped elastic band to your ankle and to the opposite foot.    Next, draw your lower leg upwards to a straighten knee position while your other foot anchors the band.     Hold for 3 seconds and then slowly lower leg back to starting position. keep thigh rotated slightly out throughout exercise.  Repeat all repetitions on one leg and then repeat on other leg.     Repeat 3 sets " of 10 repetitions for a total of 30 times on each leg.     Sit to Stands    Start by scooting close to the front of the chair.  Then lean forward and place your hands on your thighs. Rise up to standing using your hands for support.     Sit back down using your hands for support on your thighs and then repeat.     Repeat 3 sets of 10 repetitions for a total of 30 times.

## 2023-05-03 NOTE — PROGRESS NOTES
See Initial Evaluation in Treatment section.   
Additional Notes: Patient consent was obtained to proceed with the visit and recommended plan of care after discussion of all risks and benefits, including the risks of COVID-19 exposure.
Detail Level: Simple

## 2023-05-08 NOTE — PLAN OF CARE
[facility]  Physical Therapy Initial Evaluation    Name: Dede Silver  Clinic Number: [id number]    Therapy Diagnosis:   Encounter Diagnoses   Name Primary?    Psoriatic arthritis     Bilateral hip pain     Weakness of both lower extremities      Physician: Tim Sawant, *    Physician Orders: PT Eval and Treat  Medical Diagnosis from Referral: L40.50 (ICD-10-CM) - Psoriatic arthritis: Evaluate and treat anterior thigh pain / quadriceps tendinitis  Evaluation Date: 5/3/2023  Authorization Period Expiration: 2/27/2024  Plan of Care Expiration: 5/3/2023 to 7/31/2023  Visit # / Visits authorized: 1/1    FOTO: 1/5    Time In: 7:03 AM  Time Out: 8:00 AM  Total Billable Time: 57 minutes (Low Complexity Evaluation)    Precautions: Psoriatic arthritis     Subjective     Date of onset: ~June 2022    History of current condition - Dede reports: she has right hip pain that prevents her from sleeping on the R side. She can't walk too much and she feels heavy all the time. Attributes her pain to her psoriatic arthritis diagnosis. During a flare up she can't walk more than 10-15 minutes. For awhile she couldn't make groceries because it was a lot of walking and tires her out. Left hip hurts as well but not as much as the right side. [place] last year and stayed on an airplane for ~15 hours and assumed the pain came from the flight. Waited the pain out, but it never improved. Points to the pain as being her anterolateral hip and groin. Describes the pain as tender, dull, and aching. Has numbness and tingling but states it's unrelated. Also endorses low back pain for the last 3 years from her psoriatic arthritis. States she has flare-ups about every 3 months, but she recently had 2 flare-ups in the last few weeks due to not having her medication from the pharmacy.      Medical History:   Past Medical History:   Diagnosis Date    Arthritis     Fibromyalgia     Peripheral neuropathy  10/1/2021       Surgical History:   Dede Silver  has no past surgical history on file.    Medications:   Dede has a current medication list which includes the following prescription(s): celecoxib, clobetasol, folic acid, ketoconazole, methotrexate, pantoprazole, and sulfasalazine.    Allergies:   Review of patient's allergies indicates:  No Known Allergies     Imaging:   X-Ray Right Hip (3/21/2023):  FINDINGS:  Osseous structures appear intact without evidence of fracture or osseous destructive process.  No apparent dislocation.     No advanced degenerative change or significant joint space narrowing.    Prior Therapy: None  Social History:  lives with their family; 1 story home  Occupation: None  Prior Level of Function: Intermittent hip pain from psoriatic arthritis   Current Level of Function: Increased hip pain with walking, stairs, performing household tasks.     Pain:  Current 2/10, worst 5/10, best 2/10   Location: right anterolateral hip and groin   Description: tender, dull and aching  Aggravating Factors: Standing, walking, stairs, ADL's  Easing Factors: rest    Pts goals: To make muscles more strong and to be more functional, not just controlling the pain.     Objective     Posture: Increased thoracic kyphosis and increased lumbar lordosis  Palpation: NTTP  Sensation: Intact    Range of Motion/Strength:     Thoracolumbar AROM Pain/Dysfunction with Movement   Flexion 55    Extension 10 Stiffness   Right side bending 15    Left side bending 15    Right rotation 75% limited    Left rotation 75% limited      Hip Right Left Pain/Dysfunction with Movement   AROM/PROM      Flexion WNL WNL    Extension WNL WNL    Abduction WNL WNL    Adduction WNl WNL    Internal Rotation 25% limited 25% limited    External Rotation WNL WNL      Knee Right Left Pain/Dysfunction with Movement   AROM/PROM      Flexion WNL WNL    Extension WNL WNL        All below testing performed in seated position. Gait belt used for  "quadriceps testing.    Lower extremity strength with [employer] handheld dynamometer Right  (Kgf) Left  (Kgf) Pain/dysfunction with movement   (approx 4 sec hold w/ max contraction)   Hip flexion 4.2* 3.5     Hip abduction 3.9  4.5     Hip IR 3.9* 2.9    Hip ER 3.1* 2.9    Hip Ext 3.3 3.1    Quadriceps 4.2 3.4    Hamstrings 4.3 3.3         Joint Mobility:   - Hip:  limited lateral and inferior glide on R as compared to L      Flexibility:   Hamstring 90/90 Right: -40 degrees  Hamstring 90/90 Left: -30 degrees    Special Tests:     Test Right Left Pain/Dysfunction with Movement   DEIRDRE (-) (-)    Scouring (+) (+)    FADDIR (+) (+)        CMS Impairment/Limitation/Restriction for FOTO Hip Survey    Therapist reviewed FOTO scores for Dede Silver on 5/3/2023.   FOTO documents entered into [employer] - see Media section.    Current Limitation Score: 64%  Predicted Limitation Score: 40%  Category: Mobility         TREATMENT     Treatment Time In: 7:40 AM  Treatment Time Out: 8:00 AM  Total Treatment time separate from Evaluation: 20 minutes    Dede received therapeutic exercises to develop strength, endurance, and flexibility for 15 minutes including:  DL Gluteal Bridges: 10x, 5" holds  Supine SLR's: 10x, B  Long-Sit HS Stretch: Reviewed  Sidelying Clamshells: 10x, GTB  LAQ's: 10x, GTB  Sit to Stands: 10x, high/low mat    Dede received the following manual therapy techniques: Joint mobilizations were applied to the: R Hip for 5 minutes, including:  Lateral femoral glides with belt  Inferolateral femoral glides with belt     Home Exercises and Patient Education Provided    Education provided:   - Importance and role of physical therapy  - Proper body mechanics when performing HEP    Written Home Exercises Provided: yes.  Exercises were reviewed and Dede was able to demonstrate them prior to the end of the session.  Dede demonstrated good  understanding of the education provided.     See EMR under Patient Instructions for " exercises provided 5/3/2023.    Assessment     Dede is a 42 y.o. female referred to outpatient Physical Therapy with a medical diagnosis of psoriatic arthritis. Pt presents to PT with pain, decreased bilateral hip ROM, decreased strength and flexibility of bilateral lower extremities, poor posture, impaired balance and gait, and functional deficits with ambulation, stairs, going from sitting to standing, and performance of ADL's. Patient presents with signs and symptoms consistent with joint pain from psoriatic arthritis and would benefit from participating in killed physical therapy to address deficits listed above.      Pt prognosis is Fair.   Pt will benefit from skilled outpatient Physical Therapy to address the deficits stated above and in the chart below, provide pt/family education, and to maximize pt's level of independence.     Plan of care discussed with patient: Yes  Pt's spiritual, cultural and educational needs considered and pt agreeable to plan of care and goals as stated below:     Anticipated Barriers for therapy: COVID-19      Medical Necessity is demonstrated by the following  History  Co-morbidities and personal factors that may impact the plan of care Co-morbidities:   Peripheral neuropathy, psoriasis of scalp, drug-induced immunodeficiency, psoriatic arthritis, bilateral hip pain    Personal Factors:   no deficits     moderate   Examination  Body Structures and Functions, activity limitations and participation restrictions that may impact the plan of care Body Regions:   back  lower extremities    Body Systems:    ROM  strength  balance  gait  transfers  transitions  motor control  motor learning    Participation Restrictions:   None    Activity limitations:   Learning and applying knowledge  no deficits    General Tasks and Commands  no deficits    Communication  no deficits    Mobility  walking  sitting    Self care  no deficits    Domestic Life  shopping  cooking  doing house work  (cleaning house, washing dishes, laundry)    Interactions/Relationships  no deficits    Life Areas  no deficits    Community and Social Life  community life  recreation and leisure         low   Clinical Presentation evolving clinical presentation with changing clinical characteristics low   Decision Making/ Complexity Score: low         Goals:    Short Term Goals (6 weeks):  1. Patient to be independent with initial HEP to supplement therapy sessions.   2. Patient to improve impaired mm groups to 75% LSI to improve strength for functional mobility.   3. Patient to report decreased pain at worst to less than or equal to 3/10.     Long Term Goals (12 weeks):   1. Patient to be independent with updated HEP to supplement therapy sessions.   2. Patient to improve impaired mm groups to greater than or equal to 95% LSI to improve strength for functional mobility.  3. Patient to report no pain an B hips.   4. Patient to return to PLOF with no pain.        Plan     Plan of care Certification: 5/3/2023 to 7/31/2023.    Outpatient Physical Therapy 2 times weekly for 12 weeks to include the following interventions: Electrical Stimulation , FDN prn, Gait Training, Manual Therapy, Moist Heat/ Ice, Neuromuscular Re-ed, Patient Education, Therapeutic Activities, Therapeutic Exercise, and Kinesiotape prn.     Lauren Amaya, PT, DPT

## 2023-05-12 ENCOUNTER — CLINICAL SUPPORT (OUTPATIENT)
Dept: REHABILITATION | Facility: HOSPITAL | Age: 43
End: 2023-05-12
Attending: INTERNAL MEDICINE
Payer: COMMERCIAL

## 2023-05-12 DIAGNOSIS — M25.552 BILATERAL HIP PAIN: Primary | ICD-10-CM

## 2023-05-12 DIAGNOSIS — R29.898 WEAKNESS OF BOTH LOWER EXTREMITIES: ICD-10-CM

## 2023-05-12 DIAGNOSIS — M25.551 BILATERAL HIP PAIN: Primary | ICD-10-CM

## 2023-05-12 PROCEDURE — 97140 MANUAL THERAPY 1/> REGIONS: CPT | Mod: PN,CQ

## 2023-05-12 PROCEDURE — 97110 THERAPEUTIC EXERCISES: CPT | Mod: PN,CQ

## 2023-05-12 NOTE — PROGRESS NOTES
"[facility]   Physical Therapy Treatment Note      Name: Dede Silver  Clinic Number: [id number]    Therapy Diagnosis: No diagnosis found.  Physician: Tim Sawant, *    Visit Date: 5/12/2023  Therapy Diagnosis:        Encounter Diagnoses   Name Primary?    Psoriatic arthritis      Bilateral hip pain      Weakness of both lower extremities        Physician: Tim Sawant, *     Physician Orders: PT Eval and Treat  Medical Diagnosis from Referral: L40.50 (ICD-10-CM) - Psoriatic arthritis: Evaluate and treat anterior thigh pain / quadriceps tendinitis  Evaluation Date: 5/3/2023  Authorization Period Expiration: 2/27/2024  Plan of Care Expiration: 5/3/2023 to 7/31/2023  Visit # / Visits authorized: 2/eval + 20     PTA Visit: 1/5      FOTO: 1/5     Time In: 10:00 am   Time Out: 10:58 am   Total Billable Time: 55 minutes (3TE, 1MT)     Precautions: Psoriatic arthritis          Subjective     Pt reports: She was pretty sore and stiff after the initial evaluation.  However, she was able to do her HEP once before she had a flare up.  After the flare up, she was so fatigued that she wasn't able to do her exercises.   She  was somewhat  compliant with home exercise program.  Response to previous treatment: a little sore  Functional change: NA    Pain: 6/10  Location: bilateral hips      Objective      Objective Measures updated at progress report unless specified.     Treatment     Dede received the treatments listed below:    Dede received therapeutic exercises to develop strength, endurance, and flexibility for 50 minutes including:  DL Gluteal Bridges: 2x10, 5" holds  Supine SLR's: 2x10 , B  Long-Sit HS Stretch: Reviewed  Sidelying Clamshells: 2x10, GTB  LAQ's: 2x10, GTB  Sit to Stands: 3x10, high/low mat     Dede received the following manual therapy techniques: Joint mobilizations were applied to the: R Hip for 10 minutes, including:  Lateral femoral glides with " belt  Inferolateral femoral glides with belt      Home Exercises and Patient Education Provided     Education provided:   - Importance and role of physical therapy  - Proper body mechanics when performing HEP     Written Home Exercises Provided: yes.  Exercises were reviewed and Dede was able to demonstrate them prior to the end of the session.  Dede demonstrated good  understanding of the education provided.      See EMR under Patient Instructions for exercises provided 5/3/2023.     Assessment   Dede presented to PT for her first visit following initial eval.  Increased previously established exercises to an additional # of sets.  Dede was able to perform the progressions, but displayed moderate fatigue within the last few reps of each set. She required occasional rest breaks secondary to increased fatigue, but no reports of increased pain or discomfort. Provided verbal and tactile cues for Dede to avoid posterior lumbar rotation during clamshells which she was able to correct. Will continue to progress B LE strength as tolerated.         Pt prognosis is Fair.   Pt will benefit from skilled outpatient Physical Therapy to address the deficits stated above and in the chart below, provide pt/family education, and to maximize pt's level of independence.      Plan of care discussed with patient: Yes  Pt's spiritual, cultural and educational needs considered and pt agreeable to plan of care and goals as stated below:      Anticipated Barriers for therapy: COVID-19        Goals:   Short Term Goals (6 weeks):  1. Patient to be independent with initial HEP to supplement therapy sessions.   2. Patient to improve impaired mm groups to 75% LSI to improve strength for functional mobility.   3. Patient to report decreased pain at worst to less than or equal to 3/10.      Long Term Goals (12 weeks):   1. Patient to be independent with updated HEP to supplement therapy sessions.   2. Patient to improve impaired mm  groups to greater than or equal to 95% LSI to improve strength for functional mobility.  3. Patient to report no pain an B hips.   4. Patient to return to PLOF with no pain.        Plan     Continue to improve damien LE strength and hip mobility.     Amirah Christopher, PTA

## 2023-05-17 ENCOUNTER — CLINICAL SUPPORT (OUTPATIENT)
Dept: REHABILITATION | Facility: HOSPITAL | Age: 43
End: 2023-05-17
Attending: INTERNAL MEDICINE
Payer: COMMERCIAL

## 2023-05-17 DIAGNOSIS — M25.551 BILATERAL HIP PAIN: Primary | ICD-10-CM

## 2023-05-17 DIAGNOSIS — M25.552 BILATERAL HIP PAIN: Primary | ICD-10-CM

## 2023-05-17 DIAGNOSIS — R29.898 WEAKNESS OF BOTH LOWER EXTREMITIES: ICD-10-CM

## 2023-05-17 PROCEDURE — 97110 THERAPEUTIC EXERCISES: CPT | Mod: PN

## 2023-05-17 NOTE — PROGRESS NOTES
"[facility]   Physical Therapy Treatment Note      Name: Dede Silver  Clinic Number: [id number]    Therapy Diagnosis:   Encounter Diagnoses   Name Primary?    Bilateral hip pain Yes    Weakness of both lower extremities      Physician: Tim Sawant, *    Visit Date: 5/17/2023  Therapy Diagnosis:        Encounter Diagnoses   Name Primary?    Psoriatic arthritis      Bilateral hip pain      Weakness of both lower extremities        Physician: Tim Sawant, *     Physician Orders: PT Eval and Treat  Medical Diagnosis from Referral: L40.50 (ICD-10-CM) - Psoriatic arthritis: Evaluate and treat anterior thigh pain / quadriceps tendinitis  Evaluation Date: 5/3/2023  Authorization Period Expiration: 12/31/2023  Plan of Care Expiration: 5/3/2023 to 7/31/2023  Visit # / Visits authorized: 2/20 (+Evaluation)     PTA Visit: 0/5    FOTO: 3/5     Time In: 7:15 AM   Time Out: 8:00 AM   Total Billable Time: 45 minutes     Precautions: Psoriatic arthritis       Subjective     Pt reports: She feels increased soreness when performing her exercises to where she can't do them at home for a few days. Does feel like the therapy is overall helping, besides the soreness.   She  was somewhat  compliant with home exercise program.  Response to previous treatment: a little sore  Functional change: NA    Pain: 6/10  Location: bilateral hips      Objective      Objective Measures updated at progress report unless specified.     Treatment     Dede received the treatments listed below:      Dede received the following manual therapy techniques: Joint mobilizations were applied to the: R Hip for 5 minutes, including:  Lateral femoral glides with belt  Inferolateral femoral glides with belt       Dede received therapeutic exercises to develop strength, endurance, and flexibility for 40 minutes including:  DL Gluteal Bridges: 3x10, 5" holds  Supine SLR's: 3x10 , B  Sidelying Clamshells: 3x10, " GTB  Patient education    Not Performed Today:  LAQ's: 2x10, GTB  Sit to Stands: 3x10, high/low mat          Home Exercises and Patient Education Provided     Education provided:   - Importance and role of physical therapy  - Proper body mechanics when performing HEP     Written Home Exercises Provided: yes.  Exercises were reviewed and Dede was able to demonstrate them prior to the end of the session.  Dede demonstrated good  understanding of the education provided.      See EMR under Patient Instructions for exercises provided 5/3/2023.     Assessment     Dede presented to PT with reports of soreness following each session, but overall functional improvements. Session limited due to patient requiring extra time to perform exercises due to increasing repetitions. Tolerated fairly with no increases in pain noted. Overall patient continues to be a good candidate for skilled physical therapy to address strength deficits.         Pt prognosis is Fair.   Pt will benefit from skilled outpatient Physical Therapy to address the deficits stated above and in the chart below, provide pt/family education, and to maximize pt's level of independence.      Plan of care discussed with patient: Yes  Pt's spiritual, cultural and educational needs considered and pt agreeable to plan of care and goals as stated below:      Anticipated Barriers for therapy: COVID-19        Goals:   Short Term Goals (6 weeks):  1. Patient to be independent with initial HEP to supplement therapy sessions.   2. Patient to improve impaired mm groups to 75% LSI to improve strength for functional mobility.   3. Patient to report decreased pain at worst to less than or equal to 3/10.      Long Term Goals (12 weeks):   1. Patient to be independent with updated HEP to supplement therapy sessions.   2. Patient to improve impaired mm groups to greater than or equal to 95% LSI to improve strength for functional mobility.  3. Patient to report no pain an B  hips.   4. Patient to return to PLOF with no pain.        Plan     Continue to improve damien LE strength and hip mobility.     Lauren Amaya, PT

## 2023-05-19 ENCOUNTER — CLINICAL SUPPORT (OUTPATIENT)
Dept: REHABILITATION | Facility: HOSPITAL | Age: 43
End: 2023-05-19
Attending: INTERNAL MEDICINE
Payer: COMMERCIAL

## 2023-05-19 DIAGNOSIS — M25.551 BILATERAL HIP PAIN: Primary | ICD-10-CM

## 2023-05-19 DIAGNOSIS — R29.898 WEAKNESS OF BOTH LOWER EXTREMITIES: ICD-10-CM

## 2023-05-19 DIAGNOSIS — M25.552 BILATERAL HIP PAIN: Primary | ICD-10-CM

## 2023-05-19 PROCEDURE — 97110 THERAPEUTIC EXERCISES: CPT | Mod: PN

## 2023-05-19 NOTE — PROGRESS NOTES
[facility]   Physical Therapy Treatment Note      Name: Dede Silver  Clinic Number: [id number]    Therapy Diagnosis:   Encounter Diagnoses   Name Primary?    Bilateral hip pain Yes    Weakness of both lower extremities      Physician: Tim Sawant, *    Visit Date: 5/19/2023  Therapy Diagnosis:        Encounter Diagnoses   Name Primary?    Psoriatic arthritis      Bilateral hip pain      Weakness of both lower extremities        Physician: Tim Sawant, *     Physician Orders: PT Eval and Treat  Medical Diagnosis from Referral: L40.50 (ICD-10-CM) - Psoriatic arthritis: Evaluate and treat anterior thigh pain / quadriceps tendinitis  Evaluation Date: 5/3/2023  Authorization Period Expiration: 12/31/2023  Plan of Care Expiration: 5/3/2023 to 7/31/2023  Visit # / Visits authorized: 3/20 (+Evaluation)     PTA Visit: 0/5    FOTO: 4/5     Time In: 7:05 AM   Time Out: 7:35 AM   Total Billable Time: 30 minutes     Precautions: Psoriatic arthritis       Subjective     Pt reports: Not feeling well today. Her whole body feels heavy due to a flare-up.  After last session she slept almost the whole day after feeling very tired. Had a lot of house work to perform yesterday due to sleeping most of Wednesday. Took medicine this morning due to her flare-up, but it won't kick in for another 2 hours. Would like to shorten today's session due to fatigue.   She  was somewhat  compliant with home exercise program.  Response to previous treatment: a little sore  Functional change: NA    Pain: 6/10  Location: bilateral hips      Objective      Objective Measures updated at progress report unless specified.     Treatment     Dede received the treatments listed below:      Dede received the following manual therapy techniques: Joint mobilizations were applied to the: R Hip for 0 minutes, including:  Lateral femoral glides with belt  Inferolateral femoral glides with belt       Dede received  "therapeutic exercises to develop strength, endurance, and flexibility for 30 minutes including:  Sit to Stands: 3x5, gold chair  LAQ's: 2x10, GTB, B  DL Gluteal Bridges: 3x5, 5" holds  Supine SLR's: 3x5 , B  Sidelying Clamshells: 5x, B, GTB  Patient education       Home Exercises and Patient Education Provided     Education provided:   - Importance and role of physical therapy  - Proper body mechanics when performing HEP     Written Home Exercises Provided: yes.  Exercises were reviewed and Dede was able to demonstrate them prior to the end of the session.  Dede demonstrated good  understanding of the education provided.      See EMR under Patient Instructions for exercises provided 5/3/2023.     Assessment     Dede presented to therapy with reports of a current flare-up and her entire body feeling heavy and painful. Modified today's session with less repetitions of each exercise and performed to tolerance. Tolerated majority of session fairly, however clamshells were very difficult. Educated patient to decrease repetitions at home and to take increased breaks when performing HEP at home. Overall patient continues to be a good candidate for skilled physical therapy to address strength and endurance deficits.         Pt prognosis is Fair.   Pt will benefit from skilled outpatient Physical Therapy to address the deficits stated above and in the chart below, provide pt/family education, and to maximize pt's level of independence.      Plan of care discussed with patient: Yes  Pt's spiritual, cultural and educational needs considered and pt agreeable to plan of care and goals as stated below:      Anticipated Barriers for therapy: COVID-19        Goals:   Short Term Goals (6 weeks):  1. Patient to be independent with initial HEP to supplement therapy sessions.   2. Patient to improve impaired mm groups to 75% LSI to improve strength for functional mobility.   3. Patient to report decreased pain at worst to less " than or equal to 3/10.      Long Term Goals (12 weeks):   1. Patient to be independent with updated HEP to supplement therapy sessions.   2. Patient to improve impaired mm groups to greater than or equal to 95% LSI to improve strength for functional mobility.  3. Patient to report no pain an B hips.   4. Patient to return to PLOF with no pain.        Plan     Continue to improve damien LE strength and hip mobility.     Lauren Amaya, PT

## 2023-05-24 ENCOUNTER — CLINICAL SUPPORT (OUTPATIENT)
Dept: REHABILITATION | Facility: HOSPITAL | Age: 43
End: 2023-05-24
Attending: INTERNAL MEDICINE
Payer: COMMERCIAL

## 2023-05-24 DIAGNOSIS — M25.552 BILATERAL HIP PAIN: Primary | ICD-10-CM

## 2023-05-24 DIAGNOSIS — R29.898 WEAKNESS OF BOTH LOWER EXTREMITIES: ICD-10-CM

## 2023-05-24 DIAGNOSIS — M25.551 BILATERAL HIP PAIN: Primary | ICD-10-CM

## 2023-05-24 PROCEDURE — 97110 THERAPEUTIC EXERCISES: CPT | Mod: PN

## 2023-05-24 NOTE — PROGRESS NOTES
"[facility]   Physical Therapy Treatment Note      Name: Dede Silver  Clinic Number: [id number]    Therapy Diagnosis:   Encounter Diagnoses   Name Primary?    Bilateral hip pain Yes    Weakness of both lower extremities      Physician: Tim Sawant, *    Visit Date: 5/24/2023  Therapy Diagnosis:        Encounter Diagnoses   Name Primary?    Psoriatic arthritis      Bilateral hip pain      Weakness of both lower extremities        Physician: Tim Sawant, *     Physician Orders: PT Eval and Treat  Medical Diagnosis from Referral: L40.50 (ICD-10-CM) - Psoriatic arthritis: Evaluate and treat anterior thigh pain / quadriceps tendinitis  Evaluation Date: 5/3/2023  Authorization Period Expiration: 12/31/2023  Plan of Care Expiration: 5/3/2023 to 7/31/2023  Visit # / Visits authorized: 4/20 (+Evaluation)     PTA Visit: 0/5    FOTO: 5/5 NEXT SESSION     Time In: 7:05 AM   Time Out: 7:50 AM   Total Billable Time: 45 minutes     Precautions: Psoriatic arthritis       Subjective     Pt reports: Feeling less fatigued than last session. Having some pain and heaviness in bilateral anterior hips.   She  was somewhat  compliant with home exercise program.  Response to previous treatment: a little sore  Functional change: NA    Pain: 6/10  Location: bilateral hips      Objective      Objective Measures updated at progress report unless specified.     Treatment     Dede received the treatments listed below:      Dede received the following manual therapy techniques: Joint mobilizations were applied to the: R Hip for 0 minutes, including:  Lateral femoral glides with belt  Inferolateral femoral glides with belt       Dede received therapeutic exercises to develop strength, endurance, and flexibility for 45 minutes including:  Sit to Stands: 2x10, high/low mat  LAQ's: 2x10, GTB, B  DL Gluteal Bridges: 3x5, 5" holds  Supine SLR's: 3x5 , B  Supine Clamshells: 3x10, B, BlueTB  Prone Rectus " "Stretch: 15"x5, 1/2 [facility]  Patient education       Home Exercises and Patient Education Provided     Education provided:   - Importance and role of physical therapy  - Proper body mechanics when performing HEP     Written Home Exercises Provided: yes.  Exercises were reviewed and Dede was able to demonstrate them prior to the end of the session.  Dede demonstrated good  understanding of the education provided.      See EMR under Patient Instructions for exercises provided 5/3/2023.     Assessment     Dede presented to therapy with reports of improvements in symptoms since last session, but continues to have heaviness feeling in hips and pain pointing to ASIS. Tolerated session fairly with ability to return to sets of 10 repetitions before fatigue. Implemented hip flexor stretching as patient reported tightness in pain in region. Decreased ability to hold yoga stretch straps due to wrist pain. Overall patient is making minimal progress due to intermittent flare-ups of PA. Educated patient to decrease repetitions at home and to take increased breaks when performing HEP at home.         Pt prognosis is Fair.   Pt will benefit from skilled outpatient Physical Therapy to address the deficits stated above and in the chart below, provide pt/family education, and to maximize pt's level of independence.      Plan of care discussed with patient: Yes  Pt's spiritual, cultural and educational needs considered and pt agreeable to plan of care and goals as stated below:      Anticipated Barriers for therapy: COVID-19        Goals:   Short Term Goals (6 weeks):  1. Patient to be independent with initial HEP to supplement therapy sessions.   2. Patient to improve impaired mm groups to 75% LSI to improve strength for functional mobility.   3. Patient to report decreased pain at worst to less than or equal to 3/10.      Long Term Goals (12 weeks):   1. Patient to be independent with updated HEP to supplement therapy sessions. "   2. Patient to improve impaired mm groups to greater than or equal to 95% LSI to improve strength for functional mobility.  3. Patient to report no pain an B hips.   4. Patient to return to PLOF with no pain.        Plan     Continue to improve damien LE strength and hip mobility.     Lauren Amaya, PT

## 2023-05-29 ENCOUNTER — LAB VISIT (OUTPATIENT)
Dept: LAB | Facility: HOSPITAL | Age: 43
End: 2023-05-29
Payer: COMMERCIAL

## 2023-05-29 DIAGNOSIS — Z79.899 HIGH RISK MEDICATION USE: ICD-10-CM

## 2023-05-29 DIAGNOSIS — L40.50 PSORIATIC ARTHRITIS: Chronic | ICD-10-CM

## 2023-05-29 DIAGNOSIS — E55.9 VITAMIN D DEFICIENCY: ICD-10-CM

## 2023-05-29 DIAGNOSIS — Z00.00 ROUTINE MEDICAL EXAM: ICD-10-CM

## 2023-05-29 LAB
25(OH)D3+25(OH)D2 SERPL-MCNC: 31 NG/ML (ref 30–96)
ALBUMIN SERPL BCP-MCNC: 4.1 G/DL (ref 3.5–5.2)
ALP SERPL-CCNC: 55 U/L (ref 55–135)
ALT SERPL W/O P-5'-P-CCNC: 10 U/L (ref 10–44)
ANION GAP SERPL CALC-SCNC: 7 MMOL/L (ref 8–16)
AST SERPL-CCNC: 10 U/L (ref 10–40)
BASOPHILS # BLD AUTO: 0.04 K/UL (ref 0–0.2)
BASOPHILS NFR BLD: 0.7 % (ref 0–1.9)
BILIRUB SERPL-MCNC: 0.5 MG/DL (ref 0.1–1)
BUN SERPL-MCNC: 12 MG/DL (ref 6–20)
CALCIUM SERPL-MCNC: 9.1 MG/DL (ref 8.7–10.5)
CHLORIDE SERPL-SCNC: 103 MMOL/L (ref 95–110)
CHOLEST SERPL-MCNC: 223 MG/DL (ref 120–199)
CHOLEST/HDLC SERPL: 5.6 {RATIO} (ref 2–5)
CO2 SERPL-SCNC: 26 MMOL/L (ref 23–29)
CREAT SERPL-MCNC: 0.7 MG/DL (ref 0.5–1.4)
CRP SERPL-MCNC: 2.6 MG/L (ref 0–8.2)
DIFFERENTIAL METHOD: ABNORMAL
EOSINOPHIL # BLD AUTO: 0.1 K/UL (ref 0–0.5)
EOSINOPHIL NFR BLD: 1.7 % (ref 0–8)
ERYTHROCYTE [DISTWIDTH] IN BLOOD BY AUTOMATED COUNT: 15.7 % (ref 11.5–14.5)
ERYTHROCYTE [SEDIMENTATION RATE] IN BLOOD BY PHOTOMETRIC METHOD: 13 MM/HR (ref 0–36)
EST. GFR  (NO RACE VARIABLE): >60 ML/MIN/1.73 M^2
GLUCOSE SERPL-MCNC: 107 MG/DL (ref 70–110)
HCT VFR BLD AUTO: 34.5 % (ref 37–48.5)
HDLC SERPL-MCNC: 40 MG/DL (ref 40–75)
HDLC SERPL: 17.9 % (ref 20–50)
HGB BLD-MCNC: 10.9 G/DL (ref 12–16)
IMM GRANULOCYTES # BLD AUTO: 0.01 K/UL (ref 0–0.04)
IMM GRANULOCYTES NFR BLD AUTO: 0.2 % (ref 0–0.5)
LDLC SERPL CALC-MCNC: 152.8 MG/DL (ref 63–159)
LYMPHOCYTES # BLD AUTO: 2 K/UL (ref 1–4.8)
LYMPHOCYTES NFR BLD: 32.7 % (ref 18–48)
MCH RBC QN AUTO: 29.5 PG (ref 27–31)
MCHC RBC AUTO-ENTMCNC: 31.6 G/DL (ref 32–36)
MCV RBC AUTO: 94 FL (ref 82–98)
MONOCYTES # BLD AUTO: 0.4 K/UL (ref 0.3–1)
MONOCYTES NFR BLD: 6 % (ref 4–15)
NEUTROPHILS # BLD AUTO: 3.5 K/UL (ref 1.8–7.7)
NEUTROPHILS NFR BLD: 58.7 % (ref 38–73)
NONHDLC SERPL-MCNC: 183 MG/DL
NRBC BLD-RTO: 0 /100 WBC
PLATELET # BLD AUTO: 244 K/UL (ref 150–450)
PMV BLD AUTO: 10.1 FL (ref 9.2–12.9)
POTASSIUM SERPL-SCNC: 4.5 MMOL/L (ref 3.5–5.1)
PROT SERPL-MCNC: 7.3 G/DL (ref 6–8.4)
RBC # BLD AUTO: 3.69 M/UL (ref 4–5.4)
SODIUM SERPL-SCNC: 136 MMOL/L (ref 136–145)
TRIGL SERPL-MCNC: 151 MG/DL (ref 30–150)
WBC # BLD AUTO: 5.96 K/UL (ref 3.9–12.7)

## 2023-05-29 PROCEDURE — 85025 COMPLETE CBC W/AUTO DIFF WBC: CPT | Performed by: INTERNAL MEDICINE

## 2023-05-29 PROCEDURE — 80053 COMPREHEN METABOLIC PANEL: CPT | Performed by: INTERNAL MEDICINE

## 2023-05-29 PROCEDURE — 36415 COLL VENOUS BLD VENIPUNCTURE: CPT | Mod: PO | Performed by: NURSE PRACTITIONER

## 2023-05-29 PROCEDURE — 82306 VITAMIN D 25 HYDROXY: CPT | Performed by: NURSE PRACTITIONER

## 2023-05-29 PROCEDURE — 85652 RBC SED RATE AUTOMATED: CPT | Performed by: INTERNAL MEDICINE

## 2023-05-29 PROCEDURE — 86140 C-REACTIVE PROTEIN: CPT | Performed by: INTERNAL MEDICINE

## 2023-05-29 PROCEDURE — 80061 LIPID PANEL: CPT | Performed by: NURSE PRACTITIONER

## 2023-06-01 ENCOUNTER — CLINICAL SUPPORT (OUTPATIENT)
Dept: REHABILITATION | Facility: HOSPITAL | Age: 43
End: 2023-06-01
Attending: INTERNAL MEDICINE
Payer: COMMERCIAL

## 2023-06-01 DIAGNOSIS — M25.552 BILATERAL HIP PAIN: Primary | ICD-10-CM

## 2023-06-01 DIAGNOSIS — R29.898 WEAKNESS OF BOTH LOWER EXTREMITIES: ICD-10-CM

## 2023-06-01 DIAGNOSIS — M25.551 BILATERAL HIP PAIN: Primary | ICD-10-CM

## 2023-06-01 PROCEDURE — 97110 THERAPEUTIC EXERCISES: CPT | Mod: PN,CQ

## 2023-06-01 NOTE — PROGRESS NOTES
[facility]   Physical Therapy Treatment Note      Name: Ddee joyce  Clinic Number: [id number]    Therapy Diagnosis:   Encounter Diagnoses   Name Primary?    Bilateral hip pain Yes    Weakness of both lower extremities        Physician: Tim Sawant, *    Visit Date: 6/1/2023  Therapy Diagnosis:        Encounter Diagnoses   Name Primary?    Psoriatic arthritis      Bilateral hip pain      Weakness of both lower extremities        Physician: Tim Sawant, *     Physician Orders: PT Eval and Treat  Medical Diagnosis from Referral: L40.50 (ICD-10-CM) - Psoriatic arthritis: Evaluate and treat anterior thigh pain / quadriceps tendinitis  Evaluation Date: 5/3/2023  Authorization Period Expiration: 12/31/2023  Plan of Care Expiration: 5/3/2023 to 7/31/2023  Visit # / Visits authorized: 4/20 (+Evaluation)     PTA Visit: 1/5    FOTO: 5/5 NEXT SESSION     Time In: 3:00 pm   Time Out: 3:50 pm    Total Billable Time: 50 minutes (4TE)     Precautions: Psoriatic arthritis       Subjective     Pt reports: She's still a little fatigued but she isn't feeling as bad as she was last week.  She needs to leave 10 minutes early today to make sure she makes an appointment with her kids.   She  was somewhat  compliant with home exercise program.  Response to previous treatment: a little sore  Functional change: NA    Pain: 6/10  Location: bilateral hips      Objective      Objective Measures updated at progress report unless specified.     Treatment     Dede received the treatments listed below:      Dede received the following manual therapy techniques: Joint mobilizations were applied to the: R Hip for 0 minutes, including:  Lateral femoral glides with belt  Inferolateral femoral glides with belt       Dede received therapeutic exercises to develop strength, endurance, and flexibility for 50  minutes including:  Sit to Stands: 3x10, high/low mat  LAQ's: 3x10, GTB, B  DL Gluteal Bridges:  "3x7, 5" holds  Supine SLR's: 3x7 , B  Supine Clamshells: 3x10, B, BlueTB  Prone Rectus Stretch: 15"x5, 1/2 bolster  Patient education       Home Exercises and Patient Education Provided     Education provided:   - Importance and role of physical therapy  - Proper body mechanics when performing HEP     Written Home Exercises Provided: yes.  Exercises were reviewed and Dede was able to demonstrate them prior to the end of the session.  Dede demonstrated good  understanding of the education provided.      See EMR under Patient Instructions for exercises provided 5/3/2023.     Assessment          Dede presented to PT reporting decreased symptoms following last session. Continued with therex for improved damien hip and LE strength.  Dede was able to progress from 5 to 7 reps of SLR's and Bridges before reporting increased levels of fatigue preventing her from continuing. Dede continues to display moderate levels of fatigue while performing, requiring increased rest breaks between exercises.  Will continue to progress BLE  strength as tolerated.            Pt prognosis is Fair.   Pt will benefit from skilled outpatient Physical Therapy to address the deficits stated above and in the chart below, provide pt/family education, and to maximize pt's level of independence.      Plan of care discussed with patient: Yes  Pt's spiritual, cultural and educational needs considered and pt agreeable to plan of care and goals as stated below:      Anticipated Barriers for therapy: COVID-19        Goals:   Short Term Goals (6 weeks):  1. Patient to be independent with initial HEP to supplement therapy sessions.   2. Patient to improve impaired mm groups to 75% LSI to improve strength for functional mobility.   3. Patient to report decreased pain at worst to less than or equal to 3/10.      Long Term Goals (12 weeks):   1. Patient to be independent with updated HEP to supplement therapy sessions.   2. Patient to improve impaired " mm groups to greater than or equal to 95% LSI to improve strength for functional mobility.  3. Patient to report no pain an B hips.   4. Patient to return to PLOF with no pain.        Plan     Continue to improve damien LE strength and hip mobility.     Amirah Christopher, PTA

## 2023-06-02 NOTE — PROGRESS NOTES
"[facility]   Physical Therapy Treatment Note      Name: Dede Silver  Clinic Number: [id number]    Therapy Diagnosis:   Encounter Diagnoses   Name Primary?    Bilateral hip pain Yes    Weakness of both lower extremities      Physician: Tim Sawant, *    Visit Date: 6/1/2023  Therapy Diagnosis:        Encounter Diagnoses   Name Primary?    Psoriatic arthritis      Bilateral hip pain      Weakness of both lower extremities        Physician: Tim Sawant, *     Physician Orders: PT Eval and Treat  Medical Diagnosis from Referral: L40.50 (ICD-10-CM) - Psoriatic arthritis: Evaluate and treat anterior thigh pain / quadriceps tendinitis  Evaluation Date: 5/3/2023  Authorization Period Expiration: 12/31/2023  Plan of Care Expiration: 5/3/2023 to 7/31/2023  Visit # / Visits authorized: 4/20 (+Evaluation)     PTA Visit: 0/5    FOTO: 5/5 NEXT SESSION     Time In: 7:05 AM   Time Out: 7:50 AM   Total Billable Time: 45 minutes     Precautions: Psoriatic arthritis       Subjective     Pt reports: She's still a little fatigued but she isn't feeling as bad as she was last week.  She needs to leave 10 minutes early today to make sure she makes an appointment with her kids.      She  was somewhat  compliant with home exercise program.  Response to previous treatment: a little sore  Functional change: NA    Pain: 6/10  Location: bilateral hips      Objective      Objective Measures updated at progress report unless specified.     Treatment     Dede received the treatments listed below:      Dede received the following manual therapy techniques: Joint mobilizations were applied to the: R Hip for 0 minutes, including:  Lateral femoral glides with belt  Inferolateral femoral glides with belt       Dede received therapeutic exercises to develop strength, endurance, and flexibility for 50 minutes including:  Sit to Stands: 2x10, high/low mat  LAQ's: 2x10, GTB, B  DL Gluteal Bridges: 3x5, 5" " "holds  Supine SLR's: 3x5 , B  Supine Clamshells: 3x10, B, BlueTB  Prone Rectus Stretch: 15"x5, 1/2 lawson  Patient education       Home Exercises and Patient Education Provided     Education provided:   - Importance and role of physical therapy  - Proper body mechanics when performing HEP     Written Home Exercises Provided: yes.  Exercises were reviewed and Dede was able to demonstrate them prior to the end of the session.  Dede demonstrated good  understanding of the education provided.      See EMR under Patient Instructions for exercises provided 5/3/2023.     Assessment       Dede presented to PT reporting decreased symptoms following last session. Continued with therex for improved damien hip and LE strength.  Dede was able to progress from 5 to 7 reps of SLR's and Bridges before reporting increased levels of fatigue preventing her from continuing. Dede continues to display moderate levels of fatigue while performing, requiring increased rest breaks between exercises.  Will continue to progress BLE  strength as tolerated.        Pt prognosis is Fair.   Pt will benefit from skilled outpatient Physical Therapy to address the deficits stated above and in the chart below, provide pt/family education, and to maximize pt's level of independence.      Plan of care discussed with patient: Yes  Pt's spiritual, cultural and educational needs considered and pt agreeable to plan of care and goals as stated below:      Anticipated Barriers for therapy: COVID-19        Goals:   Short Term Goals (6 weeks):  1. Patient to be independent with initial HEP to supplement therapy sessions.   2. Patient to improve impaired mm groups to 75% LSI to improve strength for functional mobility.   3. Patient to report decreased pain at worst to less than or equal to 3/10.      Long Term Goals (12 weeks):   1. Patient to be independent with updated HEP to supplement therapy sessions.   2. Patient to improve impaired mm groups to " greater than or equal to 95% LSI to improve strength for functional mobility.  3. Patient to report no pain an B hips.   4. Patient to return to PLOF with no pain.        Plan     Continue to improve damien LE strength and hip mobility.     Amirah Christopher, PTA

## 2023-06-02 NOTE — PROGRESS NOTES
"[facility]   Physical Therapy Treatment Note      Name: Dede Silver  Clinic Number: [id number]    Therapy Diagnosis:   Encounter Diagnoses   Name Primary?    Bilateral hip pain Yes    Weakness of both lower extremities      Physician: Tim Sawant, *    Visit Date: 6/1/2023  Therapy Diagnosis:        Encounter Diagnoses   Name Primary?    Psoriatic arthritis      Bilateral hip pain      Weakness of both lower extremities        Physician: Tim Sawant, *     Physician Orders: PT Eval and Treat  Medical Diagnosis from Referral: L40.50 (ICD-10-CM) - Psoriatic arthritis: Evaluate and treat anterior thigh pain / quadriceps tendinitis  Evaluation Date: 5/3/2023  Authorization Period Expiration: 12/31/2023  Plan of Care Expiration: 5/3/2023 to 7/31/2023  Visit # / Visits authorized: 4/20 (+Evaluation)     PTA Visit: 0/5    FOTO: 5/5 NEXT SESSION     Time In: 3:00 pm   Time Out: 3:50 pm   Total Billable Time: 45 minutes     Precautions: Psoriatic arthritis       Subjective     Pt reports: She's not feeling as fatigued as she was last week, but she still feels heaviness in her hips. She needs to leave about 10 minutes early today to     She  was somewhat  compliant with home exercise program.  Response to previous treatment: a little sore  Functional change: NA    Pain: 6/10  Location: bilateral hips      Objective      Objective Measures updated at progress report unless specified.     Treatment     Dede received the treatments listed below:      Dede received the following manual therapy techniques: Joint mobilizations were applied to the: R Hip for 0 minutes, including:  Lateral femoral glides with belt  Inferolateral femoral glides with belt       Dede received therapeutic exercises to develop strength, endurance, and flexibility for 50 minutes including:  Sit to Stands: 2x10, high/low mat  LAQ's: 2x10, GTB, B  DL Gluteal Bridges: 3x5, 5" holds  Supine SLR's: 3x5 , " "B  Supine Clamshells: 3x10, B, BlueTB  Prone Rectus Stretch: 15"x5, 1/2 lawson  Patient education       Home Exercises and Patient Education Provided     Education provided:   - Importance and role of physical therapy  - Proper body mechanics when performing HEP     Written Home Exercises Provided: yes.  Exercises were reviewed and Dede was able to demonstrate them prior to the end of the session.  Dede demonstrated good  understanding of the education provided.      See EMR under Patient Instructions for exercises provided 5/3/2023.     Assessment      Dede presented to PT reporting decreased symptoms following last session. Continued with therex for improved damien hip and LE strength.  Dede was able to progress from 5 to 7 reps of SLR's and Bridges before reporting increased levels of fatigue preventing her from continuing. Dede continues to display moderate levels of fatigue while performing, requiring increased rest breaks between exercises.  Will continue to progress BLE  strength as tolerated.          Pt prognosis is Fair.   Pt will benefit from skilled outpatient Physical Therapy to address the deficits stated above and in the chart below, provide pt/family education, and to maximize pt's level of independence.      Plan of care discussed with patient: Yes  Pt's spiritual, cultural and educational needs considered and pt agreeable to plan of care and goals as stated below:      Anticipated Barriers for therapy: COVID-19        Goals:   Short Term Goals (6 weeks):  1. Patient to be independent with initial HEP to supplement therapy sessions.   2. Patient to improve impaired mm groups to 75% LSI to improve strength for functional mobility.   3. Patient to report decreased pain at worst to less than or equal to 3/10.      Long Term Goals (12 weeks):   1. Patient to be independent with updated HEP to supplement therapy sessions.   2. Patient to improve impaired mm groups to greater than or equal to 95% " LSI to improve strength for functional mobility.  3. Patient to report no pain an B hips.   4. Patient to return to PLOF with no pain.        Plan     Continue to improve damien LE strength and hip mobility.     Amirah Christopher, PTA

## 2023-06-02 NOTE — PROGRESS NOTES
[facility]   Physical Therapy Treatment Note      [facility]  Cl[facility] Number: [id number]    Therapy Diagnosis:   Encounter Diagnoses   Name Primary?    Bilateral hip pain Yes    Weakness of both lower extremities        Physician: Tim Sawant, *    Visit Date: 6/1/2023  Therapy Diagnosis:        Encounter Diagnoses   Name Primary?    Psoriatic arthritis      Bilateral hip pain      Weakness of both lower extremities        Physician: Tim Sawant, *     Physician Orders: PT Eval and Treat  Medical Diagnosis from Referral: L40.50 (ICD-10-CM) - Psoriatic arthritis: Evaluate and treat anterior thigh pain / quadriceps tendinitis  Evaluation Date: 5/3/2023  Authorization Period Expiration: 12/31/2023  Plan of Care Expiration: 5/3/2023 to 7/31/2023  Visit # / Visits authorized: 6/20 (+Evaluation)     PTA Visit: 1/5    FOTO: 5/5 NEXT SESSION     Time In: 3:00 pm   Time Out: 3:50 pm    Total Billable Time: 50 minutes (3TE)     Precautions: Psoriatic arthritis       Subjective     Pt reports: She's still a little fatigued but she isn't feeling as bad as she was last week.  She needs to leave 10 minutes early today to make sure she makes an appointment with her kids.   She  was somewhat  compliant with home exercise program.  Response to previous treatment: a little sore  Functional change: NA    Pain: 6/10  Location: bilateral hips      Objective      Objective Measures updated at progress report unless specified.     Treatment     Dede received the treatments listed below:      Dede received the following manual therapy techniques: Joint mobilizations were applied to the: R Hip for 0 minutes, including:  Lateral femoral glides with belt  Inferolateral femoral glides with belt       Dede received therapeutic exercises to develop strength, endurance, and flexibility for 50 minutes including:  Sit to Stands: 3x10, high/low mat  LAQ's: 3x10, GTB, B  DL Gluteal  "Bridges: 3x7, 5" holds  Supine SLR's: 3x7 , B  Supine Clamshells: 3x10, B, BlueTB  Prone Rectus Stretch: 15"x5, 1/2 bolster  Patient education       Home Exercises and Patient Education Provided     Education provided:   - Importance and role of physical therapy  - Proper body mechanics when performing HEP     Written Home Exercises Provided: yes.  Exercises were reviewed and Dede was able to demonstrate them prior to the end of the session.  Dede demonstrated good  understanding of the education provided.      See EMR under Patient Instructions for exercises provided 5/3/2023.     Assessment     Dede presented to PT reporting decreased symptoms following last session. Continued with therex for improved damien hip and LE strength.  Dede was able to progress from 5 to 7 reps of SLR's and Bridges before reporting increased levels of fatigue preventing her from continuing. Dede continues to display moderate levels of fatigue while performing, requiring increased rest breaks between exercises.  Will continue to progress BLE  strength as tolerated.         Pt prognosis is Fair.   Pt will benefit from skilled outpatient Physical Therapy to address the deficits stated above and in the chart below, provide pt/family education, and to maximize pt's level of independence.      Plan of care discussed with patient: Yes  Pt's spiritual, cultural and educational needs considered and pt agreeable to plan of care and goals as stated below:      Anticipated Barriers for therapy: COVID-19        Goals:   Short Term Goals (6 weeks):  1. Patient to be independent with initial HEP to supplement therapy sessions.   2. Patient to improve impaired mm groups to 75% LSI to improve strength for functional mobility.   3. Patient to report decreased pain at worst to less than or equal to 3/10.      Long Term Goals (12 weeks):   1. Patient to be independent with updated HEP to supplement therapy sessions.   2. Patient to improve impaired " mm groups to greater than or equal to 95% LSI to improve strength for functional mobility.  3. Patient to report no pain an B hips.   4. Patient to return to PLOF with no pain.        Plan     Continue to improve damien LE strength and hip mobility.     Amirah Christopher, PTA

## 2023-06-04 DIAGNOSIS — L40.50 PSORIATIC ARTHRITIS: Chronic | ICD-10-CM

## 2023-06-04 RX ORDER — CELECOXIB 200 MG/1
200 CAPSULE ORAL DAILY PRN
Qty: 30 CAPSULE | Refills: 2 | Status: CANCELLED | OUTPATIENT
Start: 2023-06-04

## 2023-06-05 ENCOUNTER — CLINICAL SUPPORT (OUTPATIENT)
Dept: REHABILITATION | Facility: HOSPITAL | Age: 43
End: 2023-06-05
Attending: INTERNAL MEDICINE
Payer: COMMERCIAL

## 2023-06-05 DIAGNOSIS — M25.551 BILATERAL HIP PAIN: Primary | ICD-10-CM

## 2023-06-05 DIAGNOSIS — M25.552 BILATERAL HIP PAIN: Primary | ICD-10-CM

## 2023-06-05 DIAGNOSIS — R29.898 WEAKNESS OF BOTH LOWER EXTREMITIES: ICD-10-CM

## 2023-06-05 PROCEDURE — 97110 THERAPEUTIC EXERCISES: CPT | Mod: PN

## 2023-06-05 NOTE — PROGRESS NOTES
"[facility]   Physical Therapy Treatment Note      Name: Dede Silver  Clinic Number: [id number]    Therapy Diagnosis:   Encounter Diagnoses   Name Primary?    Bilateral hip pain Yes    Weakness of both lower extremities      Physician: Tim Sawant, *    Visit Date: 6/5/2023  Therapy Diagnosis:        Encounter Diagnoses   Name Primary?    Psoriatic arthritis      Bilateral hip pain      Weakness of both lower extremities        Physician: Tim Sawant, *     Physician Orders: PT Eval and Treat  Medical Diagnosis from Referral: L40.50 (ICD-10-CM) - Psoriatic arthritis: Evaluate and treat anterior thigh pain / quadriceps tendinitis  Evaluation Date: 5/3/2023  Authorization Period Expiration: 12/31/2023  Plan of Care Expiration: 5/3/2023 to 7/31/2023  Visit # / Visits authorized: 6/20 (+Evaluation)     PTA Visit: 0/5    FOTO: 5/5 NEXT SESSION     Time In: 1:45 PM   Time Out: 2:30 PM   Total Billable Time: 45 minutes     Precautions: Psoriatic arthritis       Subjective     Pt reports: She's continuing to have heaviness in both of her legs and slight pain in her groin area. Feels overall the therapy is helping and her pain is improving, but it's very slow.     She  was somewhat  compliant with home exercise program.  Response to previous treatment: a little sore  Functional change: NA    Pain: 6/10  Location: bilateral hips      Objective      Objective Measures updated at progress report unless specified.     Treatment     Dede received the treatments listed below:      Dede received the following manual therapy techniques: Joint mobilizations were applied to the: R Hip for 0 minutes, including:  Lateral femoral glides with belt  Inferolateral femoral glides with belt       Dede received therapeutic exercises to develop strength, endurance, and flexibility for 45 minutes including:  Sit to Stands: 5x5, high/low mat  Supine SLR's: 3x5 , B  DL Gluteal Bridges: 3x5, 5" " "holds  +Sidelying Clamshells: 2x5, B, BlueTB  Supine Clamshells: 2x10, B, BlueTB  LAQ's: 2x10, BlueTB, B  1/2 kneeling rectus stretch: 3x10" holds on R  Patient education       Home Exercises and Patient Education Provided     Education provided:   - Importance and role of physical therapy  - Proper body mechanics when performing HEP     Written Home Exercises Provided: yes.  Exercises were reviewed and Dede was able to demonstrate them prior to the end of the session.  Dede demonstrated good  understanding of the education provided.      See EMR under Patient Instructions for exercises provided 5/3/2023.     Assessment      Dede presented to PT reporting an overall improvement in symptoms since beginning therapy. Continued with gross LE strengthening as tolerated, with slight increases in repetitions and resistance, but some modifications. Prone rectus stretching hard on hands therefore changed to 1/2 kneeling rectus stretching with good tolerance. Will reassess strength and ROM next session with PT. Continue strengthening and promoting improved joint mobility as tolerated. Return to hip mobilizations next session.      Pt prognosis is Fair.   Pt will benefit from skilled outpatient Physical Therapy to address the deficits stated above and in the chart below, provide pt/family education, and to maximize pt's level of independence.      Plan of care discussed with patient: Yes  Pt's spiritual, cultural and educational needs considered and pt agreeable to plan of care and goals as stated below:      Anticipated Barriers for therapy: COVID-19        Goals:   Short Term Goals (6 weeks):  1. Patient to be independent with initial HEP to supplement therapy sessions.   2. Patient to improve impaired mm groups to 75% LSI to improve strength for functional mobility.   3. Patient to report decreased pain at worst to less than or equal to 3/10.      Long Term Goals (12 weeks):   1. Patient to be independent with updated " HEP to supplement therapy sessions.   2. Patient to improve impaired mm groups to greater than or equal to 95% LSI to improve strength for functional mobility.  3. Patient to report no pain an B hips.   4. Patient to return to PLOF with no pain.        Plan     Continue to improve damien LE strength and hip mobility.     Lauren Amaya, PT

## 2023-06-06 ENCOUNTER — PATIENT MESSAGE (OUTPATIENT)
Dept: RHEUMATOLOGY | Facility: CLINIC | Age: 43
End: 2023-06-06
Payer: COMMERCIAL

## 2023-06-06 DIAGNOSIS — L40.50 PSORIATIC ARTHRITIS: Primary | Chronic | ICD-10-CM

## 2023-06-06 RX ORDER — METHOTREXATE 25 MG/ML
15 INJECTION, SOLUTION INTRA-ARTERIAL; INTRAMUSCULAR; INTRAVENOUS
Qty: 4 ML | Refills: 2 | Status: SHIPPED | OUTPATIENT
Start: 2023-06-06 | End: 2023-07-24

## 2023-06-06 RX ORDER — SYRINGE AND NEEDLE,INSULIN,1ML 25GX1"
0.6 SYRINGE, EMPTY DISPOSABLE MISCELLANEOUS WEEKLY
Qty: 12 EACH | Refills: 3 | Status: SHIPPED | OUTPATIENT
Start: 2023-06-06

## 2023-06-07 DIAGNOSIS — L40.50 PSORIATIC ARTHRITIS: Chronic | ICD-10-CM

## 2023-06-08 ENCOUNTER — CLINICAL SUPPORT (OUTPATIENT)
Dept: REHABILITATION | Facility: HOSPITAL | Age: 43
End: 2023-06-08
Attending: INTERNAL MEDICINE
Payer: COMMERCIAL

## 2023-06-08 DIAGNOSIS — M25.552 BILATERAL HIP PAIN: Primary | ICD-10-CM

## 2023-06-08 DIAGNOSIS — M25.551 BILATERAL HIP PAIN: Primary | ICD-10-CM

## 2023-06-08 DIAGNOSIS — R29.898 WEAKNESS OF BOTH LOWER EXTREMITIES: ICD-10-CM

## 2023-06-08 PROCEDURE — 97110 THERAPEUTIC EXERCISES: CPT | Mod: PN,CQ

## 2023-06-08 RX ORDER — CELECOXIB 200 MG/1
200 CAPSULE ORAL DAILY PRN
Qty: 30 CAPSULE | Refills: 2 | Status: SHIPPED | OUTPATIENT
Start: 2023-06-08 | End: 2023-07-24

## 2023-06-08 NOTE — PROGRESS NOTES
"[facility]   Physical Therapy Treatment Note      Name: Dede Silver  Clinic Number: [id number]    Therapy Diagnosis:   No diagnosis found.    Physician: Tim Sawant, *    Visit Date: 6/8/2023  Therapy Diagnosis:        Encounter Diagnoses   Name Primary?    Psoriatic arthritis      Bilateral hip pain      Weakness of both lower extremities        Physician: Tim Sawant, *     Physician Orders: PT Eval and Treat  Medical Diagnosis from Referral: L40.50 (ICD-10-CM) - Psoriatic arthritis: Evaluate and treat anterior thigh pain / quadriceps tendinitis  Evaluation Date: 5/3/2023  Authorization Period Expiration: 12/31/2023  Plan of Care Expiration: 5/3/2023 to 7/31/2023  Visit # / Visits authorized: 8/20 (+Evaluation)     PTA Visit: 1/5    FOTO: 5/5 NEXT SESSION     Time In: 1:45 PM   Time Out: 2:30 PM   Total Billable Time: 45 minutes     Precautions: Psoriatic arthritis       Subjective     Pt reports: She likes the new stretch for her hip.  However, she feels like it's harder to do on her right hip than on her left hip.   She  was somewhat  compliant with home exercise program.  Response to previous treatment: a little sore  Functional change: NA    Pain: 6/10  Location: bilateral hips      Objective      Objective Measures updated at progress report unless specified.     Treatment     Dede received the treatments listed below:      Dede received the following manual therapy techniques: Joint mobilizations were applied to the: R Hip for 0 minutes, including:  Lateral femoral glides with belt  Inferolateral femoral glides with belt       Dede received therapeutic exercises to develop strength, endurance, and flexibility for 50minutes including:  Sit to Stands: 5x5, high/low mat  Supine SLR's: 3x8, B  DL Gluteal Bridges: 3x8  5" holds  Sidelying Clamshells: 2x8 , B, BlueTB  Supine Clamshells: 10x  B, BlueTB  LAQ's: 2x10, BlueTB, B  1/2 kneeling rectus stretch: 3x10" " "holds on R  +Step Ups to 8" step   Patient education       Home Exercises and Patient Education Provided     Education provided:   - Importance and role of physical therapy  - Proper body mechanics when performing HEP     Written Home Exercises Provided: yes.  Exercises were reviewed and Dede was able to demonstrate them prior to the end of the session.  Dede demonstrated good  understanding of the education provided.      See EMR under Patient Instructions for exercises provided 5/3/2023.     Assessment    Dede presented to PT reporting continued improvements in symptoms, attributing positive effects to PT. Continued with gross LE strength, progressing each exercise for a few more reps. Introduced Step Ups for improved functional mobility and damien LE strength, which she was able to tolerate.  Travis continue to slowly progress gross LE strength as tolerated.     Pt prognosis is Fair.   Pt will benefit from skilled outpatient Physical Therapy to address the deficits stated above and in the chart below, provide pt/family education, and to maximize pt's level of independence.      Plan of care discussed with patient: Yes  Pt's spiritual, cultural and educational needs considered and pt agreeable to plan of care and goals as stated below:      Anticipated Barriers for therapy: COVID-19        Goals:   Short Term Goals (6 weeks):  1. Patient to be independent with initial HEP to supplement therapy sessions.   2. Patient to improve impaired mm groups to 75% LSI to improve strength for functional mobility.   3. Patient to report decreased pain at worst to less than or equal to 3/10.      Long Term Goals (12 weeks):   1. Patient to be independent with updated HEP to supplement therapy sessions.   2. Patient to improve impaired mm groups to greater than or equal to 95% LSI to improve strength for functional mobility.  3. Patient to report no pain an B hips.   4. Patient to return to PLOF with no pain.        Plan "     Continue to improve damien LE strength and hip mobility.     Amirah Christopher, PTA

## 2023-06-16 ENCOUNTER — OFFICE VISIT (OUTPATIENT)
Dept: FAMILY MEDICINE | Facility: CLINIC | Age: 43
End: 2023-06-16
Payer: COMMERCIAL

## 2023-06-16 VITALS
DIASTOLIC BLOOD PRESSURE: 72 MMHG | WEIGHT: 207.25 LBS | BODY MASS INDEX: 36.72 KG/M2 | SYSTOLIC BLOOD PRESSURE: 110 MMHG | HEART RATE: 72 BPM | TEMPERATURE: 98 F | OXYGEN SATURATION: 96 % | HEIGHT: 63 IN

## 2023-06-16 DIAGNOSIS — E66.01 CLASS 2 SEVERE OBESITY DUE TO EXCESS CALORIES WITH SERIOUS COMORBIDITY AND BODY MASS INDEX (BMI) OF 36.0 TO 36.9 IN ADULT: ICD-10-CM

## 2023-06-16 DIAGNOSIS — Z00.00 ENCOUNTER FOR MEDICAL EXAMINATION TO ESTABLISH CARE: Primary | ICD-10-CM

## 2023-06-16 DIAGNOSIS — E78.5 HYPERLIPIDEMIA, UNSPECIFIED HYPERLIPIDEMIA TYPE: ICD-10-CM

## 2023-06-16 PROCEDURE — 99213 OFFICE O/P EST LOW 20 MIN: CPT | Mod: S$GLB,,, | Performed by: FAMILY MEDICINE

## 2023-06-16 PROCEDURE — 99999 PR PBB SHADOW E&M-EST. PATIENT-LVL IV: ICD-10-PCS | Mod: PBBFAC,,, | Performed by: FAMILY MEDICINE

## 2023-06-16 PROCEDURE — 1160F RVW MEDS BY RX/DR IN RCRD: CPT | Mod: CPTII,S$GLB,, | Performed by: FAMILY MEDICINE

## 2023-06-16 PROCEDURE — 3078F DIAST BP <80 MM HG: CPT | Mod: CPTII,S$GLB,, | Performed by: FAMILY MEDICINE

## 2023-06-16 PROCEDURE — 3074F PR MOST RECENT SYSTOLIC BLOOD PRESSURE < 130 MM HG: ICD-10-PCS | Mod: CPTII,S$GLB,, | Performed by: FAMILY MEDICINE

## 2023-06-16 PROCEDURE — 99213 PR OFFICE/OUTPT VISIT, EST, LEVL III, 20-29 MIN: ICD-10-PCS | Mod: S$GLB,,, | Performed by: FAMILY MEDICINE

## 2023-06-16 PROCEDURE — 1160F PR REVIEW ALL MEDS BY PRESCRIBER/CLIN PHARMACIST DOCUMENTED: ICD-10-PCS | Mod: CPTII,S$GLB,, | Performed by: FAMILY MEDICINE

## 2023-06-16 PROCEDURE — 3074F SYST BP LT 130 MM HG: CPT | Mod: CPTII,S$GLB,, | Performed by: FAMILY MEDICINE

## 2023-06-16 PROCEDURE — 1159F MED LIST DOCD IN RCRD: CPT | Mod: CPTII,S$GLB,, | Performed by: FAMILY MEDICINE

## 2023-06-16 PROCEDURE — 3008F PR BODY MASS INDEX (BMI) DOCUMENTED: ICD-10-PCS | Mod: CPTII,S$GLB,, | Performed by: FAMILY MEDICINE

## 2023-06-16 PROCEDURE — 3078F PR MOST RECENT DIASTOLIC BLOOD PRESSURE < 80 MM HG: ICD-10-PCS | Mod: CPTII,S$GLB,, | Performed by: FAMILY MEDICINE

## 2023-06-16 PROCEDURE — 1159F PR MEDICATION LIST DOCUMENTED IN MEDICAL RECORD: ICD-10-PCS | Mod: CPTII,S$GLB,, | Performed by: FAMILY MEDICINE

## 2023-06-16 PROCEDURE — 3008F BODY MASS INDEX DOCD: CPT | Mod: CPTII,S$GLB,, | Performed by: FAMILY MEDICINE

## 2023-06-16 PROCEDURE — 99999 PR PBB SHADOW E&M-EST. PATIENT-LVL IV: CPT | Mod: PBBFAC,,, | Performed by: FAMILY MEDICINE

## 2023-06-16 RX ORDER — METHOTREXATE 25 MG/ML
15 INJECTION, SOLUTION INTRA-ARTERIAL; INTRAMUSCULAR; INTRAVENOUS
Qty: 4 ML | Refills: 2 | Status: CANCELLED | OUTPATIENT
Start: 2023-06-16

## 2023-06-16 NOTE — PROGRESS NOTES
Assessment & Plan:    Encounter for medical examination to establish care    Hyperlipidemia, unspecified hyperlipidemia type  -     Hemoglobin A1C; Future; Expected date: 06/16/2023  -     Lipid Panel; Future; Expected date: 06/16/2023    Class 2 severe obesity due to excess calories with serious comorbidity and body mass index (BMI) of 36.0 to 36.9 in adult  -     Hemoglobin A1C; Future; Expected date: 06/16/2023  -     Lipid Panel; Future; Expected date: 06/16/2023    Counseled on low fat diet and portion control to decrease her risk of CVD and help with weight loss.   Repeat lipid panel and A1c in 3 months.       Follow-up: Follow up in about 1 year (around 6/16/2024).  ______________________________________________________________________    Chief Complaint  Chief Complaint   Patient presents with    Establish Care       HPI  Dede Silver is a 42 y.o. female with medical diagnoses as listed in the medical history and problem list that presents to the office to establish care and discuss her most recent blood work. She is in her usual state of health today.     The 10-year ASCVD risk score (Sandeep VASQUEZ, et al., 2019) is: 1%    Values used to calculate the score:      Age: 42 years      Sex: Female      Is Non- : No      Diabetic: No      Tobacco smoker: No      Systolic Blood Pressure: 110 mmHg      Is BP treated: No      HDL Cholesterol: 40 mg/dL      Total Cholesterol: 223 mg/dL      Health Maintenance         Date Due Completion Date    COVID-19 Vaccine (3 - Pfizer series) 12/20/2021 10/25/2021    Pneumococcal Vaccines (Age 0-64) (1 - PCV) 03/03/2024 (Originally 10/1/1986) ---    Influenza Vaccine (Season Ended) 09/01/2023 12/28/2009    Cervical Cancer Screening 09/17/2023 9/17/2020    Mammogram 03/21/2024 3/21/2023    Hemoglobin A1c (Diabetic Prevention Screening) 09/27/2024 9/27/2021    TETANUS VACCINE 04/01/2031 4/1/2021              PAST MEDICAL HISTORY:  Past Medical History:  "  Diagnosis Date    Arthritis     Fibromyalgia     Peripheral neuropathy 10/1/2021       PAST SURGICAL HISTORY:  History reviewed. No pertinent surgical history.    SOCIAL HISTORY:  Social History     Socioeconomic History    Marital status:    Tobacco Use    Smoking status: Never    Smokeless tobacco: Never   Substance and Sexual Activity    Alcohol use: No       FAMILY HISTORY:  Family History   Problem Relation Age of Onset    Melanoma Neg Hx        ALLERGIES AND MEDICATIONS: updated and reviewed.  Review of patient's allergies indicates:  No Known Allergies  Current Outpatient Medications   Medication Sig Dispense Refill    celecoxib (CELEBREX) 200 MG capsule Take 1 capsule (200 mg total) by mouth daily as needed (arthritis). 30 capsule 2    clobetasoL (TEMOVATE) 0.05 % external solution Apply topically 2 (two) times daily. (Patient taking differently: Apply topically 2 (two) times daily.) 50 mL 3    folic acid (FOLVITE) 1 MG tablet Take 1 tablet (1 mg total) by mouth once daily. 90 tablet 3    insulin syringe-needle U-100 (BD INSULIN SYRINGE) 1 mL 25 gauge x 5/8" Syrg 0.6 mLs by Misc.(Non-Drug; Combo Route) route once a week. 12 each 3    ketoconazole (NIZORAL) 2 % shampoo Apply topically twice a week. To scalp x 5 minutes, then rinse 120 mL 5    methotrexate 2.5 MG Tab Take 8 tablets (20 mg total) by mouth every 7 days. 40 tablet 2    methotrexate 25 mg/mL injection Inject 0.6 mLs (15 mg total) into the skin every 7 days. 4 mL 2    pantoprazole (PROTONIX) 40 MG tablet Take 1 tablet (40 mg total) by mouth once daily. 30 tablet 11    sulfaSALAzine (AZULFIDINE) 500 mg Tab Take 3 tablets (1,500 mg total) by mouth 2 (two) times a day. 180 tablet 2     No current facility-administered medications for this visit.         ROS  Review of Systems   Constitutional:  Negative for activity change, fever and unexpected weight change.   HENT:  Negative for congestion and sore throat.    Eyes:  Negative for photophobia " "and visual disturbance.   Respiratory:  Negative for cough and shortness of breath.    Cardiovascular:  Negative for chest pain and leg swelling.   Gastrointestinal:  Negative for abdominal pain, constipation, diarrhea, nausea and vomiting.   Endocrine: Negative for polydipsia, polyphagia and polyuria.   Genitourinary:  Negative for dysuria and urgency.   Musculoskeletal:  Negative for arthralgias and gait problem.   Skin:  Negative for color change.   Neurological:  Negative for dizziness, weakness and headaches.   Psychiatric/Behavioral:  Negative for dysphoric mood and sleep disturbance. The patient is not nervous/anxious.          Physical Exam  Vitals:    06/16/23 1037   BP: 110/72   BP Location: Right arm   Patient Position: Sitting   BP Method: Medium (Manual)   Pulse: 72   Temp: 98.1 °F (36.7 °C)   TempSrc: Oral   SpO2: 96%   Weight: 94 kg (207 lb 3.7 oz)   Height: 5' 3" (1.6 m)    Body mass index is 36.71 kg/m².  Weight: 94 kg (207 lb 3.7 oz)   Height: 5' 3" (160 cm)   Physical Exam  Constitutional:       General: She is not in acute distress.     Appearance: She is obese.   HENT:      Head: Normocephalic and atraumatic.   Neck:      Thyroid: No thyromegaly.      Vascular: No carotid bruit.   Cardiovascular:      Rate and Rhythm: Normal rate and regular rhythm.      Pulses: Normal pulses.      Heart sounds: Normal heart sounds.   Pulmonary:      Effort: Pulmonary effort is normal. No respiratory distress.      Breath sounds: Normal breath sounds.   Musculoskeletal:      Cervical back: Neck supple.      Right lower leg: No edema.      Left lower leg: No edema.   Lymphadenopathy:      Cervical: No cervical adenopathy.   Skin:     General: Skin is warm and dry.      Findings: No rash.   Neurological:      General: No focal deficit present.      Mental Status: She is alert and oriented to person, place, and time.   Psychiatric:         Mood and Affect: Mood normal.         Behavior: Behavior normal.         " Thought Content: Thought content normal.

## 2023-06-20 ENCOUNTER — CLINICAL SUPPORT (OUTPATIENT)
Dept: REHABILITATION | Facility: HOSPITAL | Age: 43
End: 2023-06-20
Attending: INTERNAL MEDICINE
Payer: COMMERCIAL

## 2023-06-20 DIAGNOSIS — R29.898 WEAKNESS OF BOTH LOWER EXTREMITIES: ICD-10-CM

## 2023-06-20 DIAGNOSIS — M25.552 BILATERAL HIP PAIN: Primary | ICD-10-CM

## 2023-06-20 DIAGNOSIS — M25.551 BILATERAL HIP PAIN: Primary | ICD-10-CM

## 2023-06-20 PROCEDURE — 97110 THERAPEUTIC EXERCISES: CPT | Mod: PN

## 2023-06-20 PROCEDURE — 97140 MANUAL THERAPY 1/> REGIONS: CPT | Mod: PN

## 2023-06-20 NOTE — PROGRESS NOTES
"[facility]   Physical Therapy Treatment Note      Name: Dede Silver  Clinic Number: [id number]    Therapy Diagnosis:   Encounter Diagnoses   Name Primary?    Bilateral hip pain Yes    Weakness of both lower extremities        Physician: Tim Sawant, *    Visit Date: 6/20/2023     Physician Orders: PT Eval and Treat  Medical Diagnosis from Referral: L40.50 (ICD-10-CM) - Psoriatic arthritis: Evaluate and treat anterior thigh pain / quadriceps tendinitis  Evaluation Date: 5/3/2023  Authorization Period Expiration: 12/31/2023  Plan of Care Expiration: 5/3/2023 to 7/31/2023  Visit # / Visits authorized: 9/20 (+Evaluation)     PTA Visit: 0/5    FOTO: 5/5 NEXT SESSION     Time In: 9:33 AM   Time Out: 10:28 AM   Total Billable Time: 55 minutes     Precautions: Psoriatic arthritis       Subjective     Pt reports: She had a really bad flare-up the day she cancelled her previous session. She was unable to get out of bed all day. The flare-up has since improved but the pain/heaviness in her R hip continues to persist. Overall she does feel as though she's moving better functionally throughout the day.   She  was somewhat  compliant with home exercise program.  Response to previous treatment: a little sore  Functional change: NA    Pain: 6/10  Location: bilateral hips      Objective      Objective Measures updated at progress report unless specified.     Treatment     Dede received the treatments listed below:      Dede received the following manual therapy techniques: Joint mobilizations were applied to the: R Hip for 10 minutes, including:  Lateral femoral glides with belt  Inferolateral femoral glides with belt       Dede received therapeutic exercises to develop strength, endurance, and flexibility for 45 minutes including:  Supine SLR's: 3x7, B  DL Gluteal Bridges: 4x7  5" holds  Sidelying Clamshells: 10x, 7x, B, BlueTB  LAQ's: 3x10, BlueTB, B  Sit to Stands: 5x5, high/low mat  1/2 " "kneeling rectus stretch: 3x15" holds on R  +Step Ups to 8" step - NOT TODAY/FATIGUED  Patient education       Home Exercises and Patient Education Provided     Education provided:   - Importance and role of physical therapy  - Proper body mechanics when performing HEP     Written Home Exercises Provided: yes.  Exercises were reviewed and Dede was able to demonstrate them prior to the end of the session.  Dede demonstrated good  understanding of the education provided.      See EMR under Patient Instructions for exercises provided 5/3/2023.     Assessment      Dede presented to PT with reports of a recent flare-up that had her confined to bed for a few days. Currently presents with residual R hip pain/heaviness from most recent flare-up. Continued relief felt with lateral and inferolateral hip mobilizations with belt, allowing for increased ability to perform mat exercises this session. Continues with most difficulty with SLR's on R as opposed to L. Required cueing for proper body mechanics with 1/2 kneeling rectus stretch, but states that she feels a good stretch where her initial pain started some years ago. Fatigue noted following rectus stretch, and patient requested to end session at that time. Will continue to progress as tolerated.      Pt prognosis is Fair.   Pt will benefit from skilled outpatient Physical Therapy to address the deficits stated above and in the chart below, provide pt/family education, and to maximize pt's level of independence.      Plan of care discussed with patient: Yes  Pt's spiritual, cultural and educational needs considered and pt agreeable to plan of care and goals as stated below:      Anticipated Barriers for therapy: COVID-19        Goals:   Short Term Goals (6 weeks):  1. Patient to be independent with initial HEP to supplement therapy sessions.   2. Patient to improve impaired mm groups to 75% LSI to improve strength for functional mobility.   3. Patient to report " decreased pain at worst to less than or equal to 3/10.      Long Term Goals (12 weeks):   1. Patient to be independent with updated HEP to supplement therapy sessions.   2. Patient to improve impaired mm groups to greater than or equal to 95% LSI to improve strength for functional mobility.  3. Patient to report no pain an B hips.   4. Patient to return to PLOF with no pain.        Plan     Continue to improve damien LE strength and hip mobility.     Lauren Amaya, PT

## 2023-06-22 ENCOUNTER — CLINICAL SUPPORT (OUTPATIENT)
Dept: REHABILITATION | Facility: HOSPITAL | Age: 43
End: 2023-06-22
Attending: INTERNAL MEDICINE
Payer: COMMERCIAL

## 2023-06-22 DIAGNOSIS — M25.551 BILATERAL HIP PAIN: Primary | ICD-10-CM

## 2023-06-22 DIAGNOSIS — R29.898 WEAKNESS OF BOTH LOWER EXTREMITIES: ICD-10-CM

## 2023-06-22 DIAGNOSIS — M25.552 BILATERAL HIP PAIN: Primary | ICD-10-CM

## 2023-06-22 PROCEDURE — 97110 THERAPEUTIC EXERCISES: CPT | Mod: PN

## 2023-06-22 PROCEDURE — 97140 MANUAL THERAPY 1/> REGIONS: CPT | Mod: PN

## 2023-06-22 NOTE — PROGRESS NOTES
"[facility]   Physical Therapy Treatment Note      Name: Dede Silver  Clinic Number: [id number]    Therapy Diagnosis:   Encounter Diagnoses   Name Primary?    Bilateral hip pain Yes    Weakness of both lower extremities        Physician: Tim Sawant, *    Visit Date: 6/22/2023     Physician Orders: PT Eval and Treat  Medical Diagnosis from Referral: L40.50 (ICD-10-CM) - Psoriatic arthritis: Evaluate and treat anterior thigh pain / quadriceps tendinitis  Evaluation Date: 5/3/2023  Authorization Period Expiration: 12/31/2023  Plan of Care Expiration: 5/3/2023 to 7/31/2023  Visit # / Visits authorized: 9/20 (+Evaluation)     PTA Visit: 0/5    FOTO: 5/5 NEXT SESSION     Time In: 3:00 PM   Time Out: 3:55 PM   Total Billable Time: 55 minutes     Precautions: Psoriatic arthritis       Subjective     Pt reports: She's feeling a little better since her last flare-up.   She  was somewhat  compliant with home exercise program.  Response to previous treatment: a little sore  Functional change: NA    Pain: 6/10  Location: bilateral hips      Objective      Objective Measures updated at progress report unless specified.     Treatment     Dede received the treatments listed below:      Dede received the following manual therapy techniques: Joint mobilizations were applied to the: R Hip for 8 minutes, including:  Lateral femoral glides with belt  Inferolateral femoral glides with belt       Dede received therapeutic exercises to develop strength, endurance, and flexibility for 47 minutes including:  Supine SLR's: 2x10, B  DL Gluteal Bridges: 3x10,  5" holds  Sidelying Clamshells: 2x7, B, BlueTB   LAQ's: 3x10, BlueTB, B   Sit to Stands: 5x5, high/low mat  1/2 kneeling rectus stretch: 3x15" holds on R  Step Ups to 8" step: 15x  Patient education    NEXT: resisted band marching       Home Exercises and Patient Education Provided     Education provided:   - Importance and role of physical " therapy  - Proper body mechanics when performing HEP     Written Home Exercises Provided: yes.  Exercises were reviewed and Dede was able to demonstrate them prior to the end of the session.  Dede demonstrated good  understanding of the education provided.      See EMR under Patient Instructions for exercises provided 5/3/2023.     Assessment      Dede presented to PT with reports of improvements since her most recent flare-up, however she still reports fatigue. Required continued increased rest breaks throughout today's session, but able to complete increased repetitions. Continuing to have functional improvements. Will continue to progress as tolerated/able with flare-ups.     Pt prognosis is Fair.   Pt will benefit from skilled outpatient Physical Therapy to address the deficits stated above and in the chart below, provide pt/family education, and to maximize pt's level of independence.      Plan of care discussed with patient: Yes  Pt's spiritual, cultural and educational needs considered and pt agreeable to plan of care and goals as stated below:      Anticipated Barriers for therapy: COVID-19        Goals:   Short Term Goals (6 weeks):  1. Patient to be independent with initial HEP to supplement therapy sessions.   2. Patient to improve impaired mm groups to 75% LSI to improve strength for functional mobility.   3. Patient to report decreased pain at worst to less than or equal to 3/10.      Long Term Goals (12 weeks):   1. Patient to be independent with updated HEP to supplement therapy sessions.   2. Patient to improve impaired mm groups to greater than or equal to 95% LSI to improve strength for functional mobility.  3. Patient to report no pain an B hips.   4. Patient to return to PLOF with no pain.        Plan     Continue to improve damien LE strength and hip mobility.     Lauren Amaya, PT

## 2023-06-29 ENCOUNTER — CLINICAL SUPPORT (OUTPATIENT)
Dept: REHABILITATION | Facility: HOSPITAL | Age: 43
End: 2023-06-29
Attending: INTERNAL MEDICINE
Payer: COMMERCIAL

## 2023-06-29 DIAGNOSIS — R29.898 WEAKNESS OF BOTH LOWER EXTREMITIES: ICD-10-CM

## 2023-06-29 DIAGNOSIS — M25.551 BILATERAL HIP PAIN: Primary | ICD-10-CM

## 2023-06-29 DIAGNOSIS — M25.552 BILATERAL HIP PAIN: Primary | ICD-10-CM

## 2023-06-29 PROCEDURE — 97110 THERAPEUTIC EXERCISES: CPT | Mod: PN,CQ

## 2023-06-29 PROCEDURE — 97140 MANUAL THERAPY 1/> REGIONS: CPT | Mod: PN,CQ

## 2023-06-29 NOTE — PROGRESS NOTES
"[facility]   Physical Therapy Treatment Note     343   Name: Dede Silver  Clinic Number: [id number]    Therapy Diagnosis:   Encounter Diagnoses   Name Primary?    Bilateral hip pain Yes    Weakness of both lower extremities      Physician: Tim Sawant, *    Visit Date: 6/29/2023     Physician Orders: PT Eval and Treat  Medical Diagnosis from Referral: L40.50 (ICD-10-CM) - Psoriatic arthritis: Evaluate and treat anterior thigh pain / quadriceps tendinitis  Evaluation Date: 5/3/2023  Authorization Period Expiration: 12/31/2023  Plan of Care Expiration: 5/3/2023 to 7/31/2023  Visit # / Visits authorized: 11/eval + 20 (+Evaluation)     PTA Visit: 1/5    FOTO: 5/5 NEXT SESSION     Time In: 9:39 am   Time Out: 10:15 am   Total Billable Time: 35 minutes (1MT, TE)     Precautions: Psoriatic arthritis       Subjective     Pt reports: She is having a flare up and not feeling well at all.  She attributes the flare up to having a great deal of family  and friends over the week for a holiday, as well as starting her period.  She didn't want to miss today's appointment, but she doesn't know how much she can do.   She  was somewhat  compliant with home exercise program.  Response to previous treatment: a little sore  Functional change: NA    Pain: 6/10  Location: bilateral hips      Objective      Objective Measures updated at progress report unless specified.     Treatment     Dede received the treatments listed below:      Dede received the following manual therapy techniques: Joint mobilizations were applied to the: R Hip for 15 minutes, including:  Lateral femoral glides with belt  Inferolateral femoral glides with belt       Dede received therapeutic exercises for 20   to develop strength, endurance, and flexibility for minutes including:  Supine SLR's: 2x5 B  DL Gluteal Bridges: 2x5  5" holds  Sidelying Clamshells: 2x7, B, (no band)   LAQ's: 3x10 RTB , B     NP:   Sit to Stands: " "5x5, high/low mat  1/2 kneeling rectus stretch: 3x15" holds on R  Step Ups to 8" step: 15x  Patient education    NEXT: resisted band marching       Home Exercises and Patient Education Provided     Education provided:   - Importance and role of physical therapy  - Proper body mechanics when performing HEP     Written Home Exercises Provided: yes.  Exercises were reviewed and Dede was able to demonstrate them prior to the end of the session.  Dede demonstrated good  understanding of the education provided.      See EMR under Patient Instructions for exercises provided 5/3/2023.     Assessment    Dede presented to PT with reports of decreased function and increased fatigue due to recent flare up this week. Decreased level difficulty for therex, as Dede was unable to perform exercises with previous levels of resistance and dosage.  Dede was able to complete some exercises at reduced level of difficulty before needing to discontinue today's session.  Advised Dede to rest, will reasses next session, and attempt previous exercise levels of difficulty if tolerated.     Pt prognosis is Fair.   Pt will benefit from skilled outpatient Physical Therapy to address the deficits stated above and in the chart below, provide pt/family education, and to maximize pt's level of independence.      Plan of care discussed with patient: Yes  Pt's spiritual, cultural and educational needs considered and pt agreeable to plan of care and goals as stated below:      Anticipated Barriers for therapy: COVID-19        Goals:   Short Term Goals (6 weeks):  1. Patient to be independent with initial HEP to supplement therapy sessions.   2. Patient to improve impaired mm groups to 75% LSI to improve strength for functional mobility.   3. Patient to report decreased pain at worst to less than or equal to 3/10.      Long Term Goals (12 weeks):   1. Patient to be independent with updated HEP to supplement therapy sessions.   2. Patient to " improve impaired mm groups to greater than or equal to 95% LSI to improve strength for functional mobility.  3. Patient to report no pain an B hips.   4. Patient to return to PLOF with no pain.        Plan     Continue to improve damien LE strength and hip mobility.     Aimrah Christopher, PTA

## 2023-06-30 DIAGNOSIS — L40.50 PSORIATIC ARTHRITIS: Chronic | ICD-10-CM

## 2023-06-30 DIAGNOSIS — L40.9 PSORIASIS OF SCALP: ICD-10-CM

## 2023-06-30 RX ORDER — METHOTREXATE 2.5 MG/1
20 TABLET ORAL
Qty: 40 TABLET | Refills: 2 | Status: SHIPPED | OUTPATIENT
Start: 2023-06-30 | End: 2023-07-24 | Stop reason: SDUPTHER

## 2023-07-03 ENCOUNTER — PATIENT MESSAGE (OUTPATIENT)
Dept: REHABILITATION | Facility: HOSPITAL | Age: 43
End: 2023-07-03
Payer: COMMERCIAL

## 2023-07-20 ENCOUNTER — CLINICAL SUPPORT (OUTPATIENT)
Dept: REHABILITATION | Facility: HOSPITAL | Age: 43
End: 2023-07-20
Attending: INTERNAL MEDICINE
Payer: COMMERCIAL

## 2023-07-20 DIAGNOSIS — M25.551 BILATERAL HIP PAIN: Primary | ICD-10-CM

## 2023-07-20 DIAGNOSIS — R29.898 WEAKNESS OF BOTH LOWER EXTREMITIES: ICD-10-CM

## 2023-07-20 DIAGNOSIS — M25.552 BILATERAL HIP PAIN: Primary | ICD-10-CM

## 2023-07-20 PROCEDURE — 97110 THERAPEUTIC EXERCISES: CPT | Mod: PN

## 2023-07-23 NOTE — PROGRESS NOTES
"[facility]   Physical Therapy Treatment Note      Name: Dede Silver  Clinic Number: [id number]    Therapy Diagnosis:   Encounter Diagnoses   Name Primary?    Bilateral hip pain Yes    Weakness of both lower extremities      Physician: Tim Sawant, *    Visit Date: 7/20/2023     Physician Orders: PT Eval and Treat  Medical Diagnosis from Referral: L40.50 (ICD-10-CM) - Psoriatic arthritis: Evaluate and treat anterior thigh pain / quadriceps tendinitis  Evaluation Date: 5/3/2023  Authorization Period Expiration: 12/31/2023  Plan of Care Expiration: 5/3/2023 to 7/31/2023  Visit # / Visits authorized: 11/20 (+Evaluation)     PTA Visit: 0/5    FOTO: 5/5 NEXT SESSION     Time In: 5:33 PM   Time Out: 6:20 PM   Total Billable Time: 47 minutes     Precautions: Psoriatic arthritis       Subjective     Pt reports: For the last couple of weeks when she couldn't get back to therapy, she's been having flare-ups and resting. States while having the flare-ups, it's difficult to move around, do her HEP, or any household tasks. Prior to the series of flare-ups, she felt as though therapy was helping a lot with improving her pain and ability to perform functional tasks.   She  was somewhat  compliant with home exercise program.  Response to previous treatment: a little sore  Functional change: NA    Pain: 6/10  Location: bilateral hips      Objective      Objective Measures updated at progress report unless specified.     Treatment     Dede received the treatments listed below:      Dede received the following manual therapy techniques: Joint mobilizations were applied to the: R Hip for 5 minutes, including:  Lateral femoral glides with belt  Inferolateral femoral glides with belt       Dede received therapeutic exercises for 42 to develop strength, endurance, and flexibility for minutes including:  Supine SLR's: 2x5 B  DL Gluteal Bridges: 2x6, 5" holds  Supine Clamshells: 3x6, GTB   LAQ's: " "2x7, BlueTB, B  Sit to Stands: 2x6, high/low mat   1/2 kneeling hip flexor stretch: 15"x3    NP:   Step Ups to 8" step: 15x  Patient education    NEXT: resisted band marching       Home Exercises and Patient Education Provided     Education provided:   - Importance and role of physical therapy  - Proper body mechanics when performing HEP     Written Home Exercises Provided: yes.  Exercises were reviewed and Dede was able to demonstrate them prior to the end of the session.  Dede demonstrated good  understanding of the education provided.      See EMR under Patient Instructions for exercises provided 5/3/2023.     Assessment     Dede presented to PT with reports of recent flare-ups that prevented her form participating in therapy and performing her HEP. She returns today with decreased inflammation, however continues with fatigue and requires increased rest breaks between each exercise and sets. Only able to tolerate a few repetitions before requiring a rest break. Continues to see the most benefit from femoral joint mobilizations and hip flexor stretching. Strengthening progressions have been significantly limited secondary to fatigue. Will reassess next session with evaluating PT and determine if PT will continue.    Pt prognosis is Fair.   Pt will benefit from skilled outpatient Physical Therapy to address the deficits stated above and in the chart below, provide pt/family education, and to maximize pt's level of independence.      Plan of care discussed with patient: Yes  Pt's spiritual, cultural and educational needs considered and pt agreeable to plan of care and goals as stated below:      Anticipated Barriers for therapy: COVID-19        Goals:   Short Term Goals (6 weeks):  1. Patient to be independent with initial HEP to supplement therapy sessions. Progressing, Not Met  2. Patient to improve impaired mm groups to 75% LSI to improve strength for functional mobility. Progressing, Not Met  3. Patient to " report decreased pain at worst to less than or equal to 3/10. Progressing, Not Met     Long Term Goals (12 weeks):   1. Patient to be independent with updated HEP to supplement therapy sessions. Progressing, Not Met  2. Patient to improve impaired mm groups to greater than or equal to 95% LSI to improve strength for functional mobility.Progressing, Not Met  3. Patient to report no pain an B hips. Progressing, Not Met  4. Patient to return to PLOF with no pain. Progressing, Not Met       Plan     Continue to improve damien LE strength and hip mobility.     Lauren Amaya, PT

## 2023-07-24 ENCOUNTER — OFFICE VISIT (OUTPATIENT)
Dept: RHEUMATOLOGY | Facility: CLINIC | Age: 43
End: 2023-07-24
Payer: COMMERCIAL

## 2023-07-24 DIAGNOSIS — L40.50 PSORIATIC ARTHRITIS: Primary | Chronic | ICD-10-CM

## 2023-07-24 DIAGNOSIS — L40.9 PSORIASIS OF SCALP: ICD-10-CM

## 2023-07-24 DIAGNOSIS — G60.9 IDIOPATHIC PERIPHERAL NEUROPATHY: ICD-10-CM

## 2023-07-24 DIAGNOSIS — Z79.899 HIGH RISK MEDICATION USE: ICD-10-CM

## 2023-07-24 PROBLEM — R29.898 WEAKNESS OF BOTH LOWER EXTREMITIES: Status: RESOLVED | Noted: 2023-05-03 | Resolved: 2023-07-24

## 2023-07-24 PROCEDURE — 99214 PR OFFICE/OUTPT VISIT, EST, LEVL IV, 30-39 MIN: ICD-10-PCS | Mod: 95,,, | Performed by: INTERNAL MEDICINE

## 2023-07-24 PROCEDURE — 1159F MED LIST DOCD IN RCRD: CPT | Mod: CPTII,95,, | Performed by: INTERNAL MEDICINE

## 2023-07-24 PROCEDURE — 99214 OFFICE O/P EST MOD 30 MIN: CPT | Mod: 95,,, | Performed by: INTERNAL MEDICINE

## 2023-07-24 PROCEDURE — 1160F PR REVIEW ALL MEDS BY PRESCRIBER/CLIN PHARMACIST DOCUMENTED: ICD-10-PCS | Mod: CPTII,95,, | Performed by: INTERNAL MEDICINE

## 2023-07-24 PROCEDURE — 1160F RVW MEDS BY RX/DR IN RCRD: CPT | Mod: CPTII,95,, | Performed by: INTERNAL MEDICINE

## 2023-07-24 PROCEDURE — 1159F PR MEDICATION LIST DOCUMENTED IN MEDICAL RECORD: ICD-10-PCS | Mod: CPTII,95,, | Performed by: INTERNAL MEDICINE

## 2023-07-24 RX ORDER — CELECOXIB 200 MG/1
200 CAPSULE ORAL 2 TIMES DAILY PRN
Qty: 60 CAPSULE | Refills: 2 | Status: SHIPPED | OUTPATIENT
Start: 2023-07-24 | End: 2023-12-10 | Stop reason: SDUPTHER

## 2023-07-24 RX ORDER — METHOTREXATE 2.5 MG/1
20 TABLET ORAL
Qty: 40 TABLET | Refills: 2 | Status: SHIPPED | OUTPATIENT
Start: 2023-07-24 | End: 2023-11-09 | Stop reason: SDUPTHER

## 2023-07-24 RX ORDER — SULFASALAZINE 500 MG/1
1500 TABLET ORAL 2 TIMES DAILY
Qty: 180 TABLET | Refills: 2 | Status: SHIPPED | OUTPATIENT
Start: 2023-07-24 | End: 2023-12-10 | Stop reason: SDUPTHER

## 2023-07-24 NOTE — ASSESSMENT & PLAN NOTE
Doing ok except for R hip with pain out of proportion to other joints; ant groin pain, some radiation to side; disrupts sleep and limits walking and standing    Overall psoriatic arthritis sounds ok except for R hip  Previous XR of  R hip unrevealing  Need to r/o other R hip pathology with MRI    Meantime continue methotrexate and sulfasalazine and take celecoxib bid if necessary    Schedule f/u lab and RTC me 3 mo

## 2023-07-24 NOTE — PROGRESS NOTES
Subjective:       Patient ID: Dede Silver is a 42 y.o. female.    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 Right Dorsal Ulnar cutaneous neuropathy   2. Right Sural neuropathy   3. 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      Also saw hematology for kappa light chains 2021; normal CBC and other labs including CARMEN/RF/CCP     No family here    6 mo to 15 y/o        2021 started SSZ; 1500 bid  May 2022 HCQ added  Oct 2022 stopped hydroxychloroquine and added methotrexate    Celebrex      Was out of methotrexate for 1 week due to shortage but then able to resume    Last 2 weeks more joint pain  ; frequent flare ups; had painful R hip; was very bad for 2 weeks; could not sleep; hard to walk or stand for very long; hurts in groin area and some lateral tenderness    Some increase fatigue and stiffness x 2 weeks but says overall condition pretty stable except for the R hip    Has had previous MRI in past without claustrophobia    Review of Systems   Constitutional:  Negative for fever and unexpected weight change.   HENT:  Positive for mouth sores. Negative for trouble swallowing.         Had aphthous ulcers   Eyes:  Negative for redness.   Respiratory:  Negative for cough and shortness of breath.    Cardiovascular:  Negative for chest pain.   Gastrointestinal:  Negative for constipation and diarrhea.   Genitourinary:  Negative for dysuria and genital sores.   Skin:  Negative for rash.   Neurological:  Negative for headaches.   Hematological:  Does not  bruise/bleed easily.   Psychiatric/Behavioral:  Positive for sleep disturbance.         Hip pain disrupting sleep x 2 weeks       Objective:   There were no vitals taken for this visit.     Physical Exam      Assessment:       1. Psoriatic arthritis    2. High risk medication use    3. Psoriasis of scalp    4. Idiopathic peripheral neuropathy            Plan:       Problem List Items Addressed This Visit          Active Problems    Psoriatic arthritis - Primary (Chronic)     Doing ok except for R hip with pain out of proportion to other joints; ant groin pain, some radiation to side; disrupts sleep and limits walking and standing    Overall psoriatic arthritis sounds ok except for R hip  Previous XR of  R hip unrevealing  Need to r/o other R hip pathology with MRI    Meantime continue methotrexate and sulfasalazine and take celecoxib bid if necessary    Schedule f/u lab and RTC me 3 mo          Relevant Medications    methotrexate 2.5 MG Tab    celecoxib (CELEBREX) 200 MG capsule    sulfaSALAzine (AZULFIDINE) 500 mg Tab    Other Relevant Orders    MRI Hip W WO Contrast Right    Peripheral neuropathy    High risk medication use     Some nausea on methotrexate but manageable  No interval infections  Had nl methotrexate lab end of May  Will schedule f/u lab at 3 mo for methotrexate monitoring           Psoriasis of scalp    Relevant Medications    methotrexate 2.5 MG Tab         The patient location is: [place]  The chief complaint leading to consultation is: psoriatic arthritis mgt    Visit type: audiovisual    Face to Face time with patient: 20 min  30 minutes of total time spent on the encounter, which includes face to face time and non-face to face time preparing to see the patient (eg, review of tests), Obtaining and/or reviewing separately obtained history, Documenting clinical information in the electronic or other health record, Independently interpreting results (not separately reported) and communicating results  to the patient/family/caregiver, or Care coordination (not separately reported).         Each patient to whom he or she provides medical services by telemedicine is:  (1) informed of the relationship between the physician and patient and the respective role of any other health care provider with respect to management of the patient; and (2) notified that he or she may decline to receive medical services by telemedicine and may withdraw from such care at any time.    Notes:

## 2023-07-24 NOTE — ASSESSMENT & PLAN NOTE
Some nausea on methotrexate but manageable  No interval infections  Had nl methotrexate lab end of May  Will schedule f/u lab at 3 mo for methotrexate monitoring

## 2023-07-28 ENCOUNTER — CLINICAL SUPPORT (OUTPATIENT)
Dept: REHABILITATION | Facility: HOSPITAL | Age: 43
End: 2023-07-28
Attending: INTERNAL MEDICINE
Payer: COMMERCIAL

## 2023-07-28 DIAGNOSIS — M25.552 BILATERAL HIP PAIN: Primary | ICD-10-CM

## 2023-07-28 DIAGNOSIS — M25.551 BILATERAL HIP PAIN: Primary | ICD-10-CM

## 2023-07-28 PROCEDURE — 97110 THERAPEUTIC EXERCISES: CPT | Mod: PN,CQ

## 2023-07-28 NOTE — PROGRESS NOTES
"[facility]   Physical Therapy Treatment Note      Name: Dede Silver  Clinic Number: [id number]    Therapy Diagnosis:   No diagnosis found.    Physician: Tim Sawant, *    Visit Date: 7/28/2023     Physician Orders: PT Eval and Treat  Medical Diagnosis from Referral: L40.50 (ICD-10-CM) - Psoriatic arthritis: Evaluate and treat anterior thigh pain / quadriceps tendinitis  Evaluation Date: 5/3/2023  Authorization Period Expiration: 12/31/2023  Plan of Care Expiration: 5/3/2023 to 7/31/2023  Visit # / Visits authorized: 12/20 (+Evaluation)     PTA Visit: 1/5    FOTO: 5/5 NEXT SESSION     Time In: 3:38 PM   Time Out: 4:05 PM   Total Billable Time: 47 minutes     Precautions: Psoriatic arthritis       Subjective     Pt reports: that she continues to be in a flare up that's lasted over a month. Her MD has ordered an MRI of her hip to see if there's anything else going on there.  She's been doing her exercises when she can but if she has an active day, she can't do them.  She felt like PT was helping and she was moving forward, but since this flare up started, she just can't do as much as she could before.     She  was somewhat  compliant with home exercise program.  Response to previous treatment: a little sore  Functional change: NA    Pain: 6/10  Location: bilateral hips      Objective      Objective Measures updated at progress report unless specified.     Treatment     Dede received the treatments listed below:      Dede received the following manual therapy techniques: Joint mobilizations were applied to the: R Hip for 00 minutes, including:  Lateral femoral glides with belt  Inferolateral femoral glides with belt       Dede received therapeutic exercises for 35 to develop strength, endurance, and flexibility for minutes including:  Supine SLR's: 2x5 B  DL Gluteal Bridges: 2x6, 5" holds  Supine Clamshells: 3x6, GTB   LAQ's: 2x7, BlueTB, B  Sit to Stands: 2x6, high/low mat " "  1/2 kneeling hip flexor stretch: 15"x3    NP:   Step Ups to 8" step: 15x  Patient education    NEXT: resisted band marching       Home Exercises and Patient Education Provided     Education provided:   - Importance and role of physical therapy  - Proper body mechanics when performing HEP     Written Home Exercises Provided: yes.  Exercises were reviewed and Dede was able to demonstrate them prior to the end of the session.  Dede demonstrated good  understanding of the education provided.      See EMR under Patient Instructions for exercises provided 5/3/2023.     Assessment   Dede presented to PT with reports of continued flare ups that prevent her from performing HEP in conjunction with her ADL's. Dede reported increased right hip pain with supine SLR's and bridges, but able to tolerate remainder of exercises. Dede requested to end today's session early due to increased left hip pain and fatigue. Will reassess next session with evaluating PT to determine if Dede will continue to benefit from PT.     Pt prognosis is Fair.   Pt will benefit from skilled outpatient Physical Therapy to address the deficits stated above and in the chart below, provide pt/family education, and to maximize pt's level of independence.      Plan of care discussed with patient: Yes  Pt's spiritual, cultural and educational needs considered and pt agreeable to plan of care and goals as stated below:      Anticipated Barriers for therapy: COVID-19        Goals:   Short Term Goals (6 weeks):  1. Patient to be independent with initial HEP to supplement therapy sessions. Progressing, Not Met  2. Patient to improve impaired mm groups to 75% LSI to improve strength for functional mobility. Progressing, Not Met  3. Patient to report decreased pain at worst to less than or equal to 3/10. Progressing, Not Met     Long Term Goals (12 weeks):   1. Patient to be independent with updated HEP to supplement therapy sessions. Progressing, Not " Met  2. Patient to improve impaired mm groups to greater than or equal to 95% LSI to improve strength for functional mobility.Progressing, Not Met  3. Patient to report no pain an B hips. Progressing, Not Met  4. Patient to return to PLOF with no pain. Progressing, Not Met       Plan     Continue to improve damien LE strength and hip mobility.     Amirah Christopher, PTA

## 2023-08-01 ENCOUNTER — CLINICAL SUPPORT (OUTPATIENT)
Dept: REHABILITATION | Facility: HOSPITAL | Age: 43
End: 2023-08-01
Attending: INTERNAL MEDICINE
Payer: COMMERCIAL

## 2023-08-01 DIAGNOSIS — M25.551 BILATERAL HIP PAIN: Primary | ICD-10-CM

## 2023-08-01 DIAGNOSIS — M25.552 BILATERAL HIP PAIN: Primary | ICD-10-CM

## 2023-08-01 PROCEDURE — 97110 THERAPEUTIC EXERCISES: CPT | Mod: PN

## 2023-08-01 NOTE — PROGRESS NOTES
"   [facility]   Physical Therapy Treatment Note      Name: Dede Silver  Clinic Number: [id number]    Therapy Diagnosis:   Encounter Diagnosis   Name Primary?    Bilateral hip pain Yes       Physician: Tim Sawant, *    Visit Date: 8/1/2023     Physician Orders: PT Eval and Treat  Medical Diagnosis from Referral: L40.50 (ICD-10-CM) - Psoriatic arthritis: Evaluate and treat anterior thigh pain / quadriceps tendinitis  Evaluation Date: 5/3/2023  Authorization Period Expiration: 12/31/2023  Plan of Care Expiration: 5/3/2023 to 7/31/2023  Visit # / Visits authorized: 13/20 (+Evaluation)     PTA Visit: 0/5    FOTO: 5/5 NEXT SESSION     Time In: 1:45 PM   Time Out: 2:40 PM   Total Billable Time: 55 minutes     Precautions: Psoriatic arthritis       Subjective     Pt reports: She's continuing to have flare-ups of her hip pain and feelings of heaviness. Describes and points to the pain as being in her R groin. Has been unable to perform her exercises secondary to pain and heaviness. May return to her doctor if the pain continues.     She  was somewhat  compliant with home exercise program.  Response to previous treatment: a little sore  Functional change: NA    Pain: 6/10  Location: bilateral hips      Objective      Objective Measures updated at progress report unless specified.     Treatment     Dede received the treatments listed below:      Dede received the following manual therapy techniques: Joint mobilizations were applied to the: R Hip for 5 minutes, including:  Lateral femoral glides with belt  Inferolateral femoral glides with belt       Dede received therapeutic exercises for 50 to develop strength, endurance, and flexibility for 50 minutes including:  Hip Adductor Isometrics: 45"x2 minute rest breaks x 5 rounds:   2 minutes of Bridges   2 minutes of Supine Clamshells   Remainder performed with 2 min ret breaks  Sit to Stands: 4x6, high/low mat   Step Ups to 4" step: " 15x    NEXT: resisted band marching       Home Exercises and Patient Education Provided     Education provided:   - Importance and role of physical therapy  - Proper body mechanics when performing HEP     Written Home Exercises Provided: yes.  Exercises were reviewed and Dede was able to demonstrate them prior to the end of the session.  Dede demonstrated good  understanding of the education provided.      See EMR under Patient Instructions for exercises provided 5/3/2023.     Assessment   Dede presented to PT with reports of continued flare ups that prevent her from performing HEP in conjunction with her ADL's. Presented with reports of pain in anteromedial hip, therefore hip abductor isometrics performed this session with good tolerance. Patient continued to make minima progress in therapy, may contact referring physician with lack or progress.     Pt prognosis is Fair.   Pt will benefit from skilled outpatient Physical Therapy to address the deficits stated above and in the chart below, provide pt/family education, and to maximize pt's level of independence.      Plan of care discussed with patient: Yes  Pt's spiritual, cultural and educational needs considered and pt agreeable to plan of care and goals as stated below:      Anticipated Barriers for therapy: COVID-19        Goals:   Short Term Goals (6 weeks):  1. Patient to be independent with initial HEP to supplement therapy sessions. Progressing, Not Met  2. Patient to improve impaired mm groups to 75% LSI to improve strength for functional mobility. Progressing, Not Met  3. Patient to report decreased pain at worst to less than or equal to 3/10. Progressing, Not Met     Long Term Goals (12 weeks):   1. Patient to be independent with updated HEP to supplement therapy sessions. Progressing, Not Met  2. Patient to improve impaired mm groups to greater than or equal to 95% LSI to improve strength for functional mobility.Progressing, Not Met  3. Patient to  "report no pain an B hips. Progressing, Not Met  4. Patient to return to PLOF with no pain. Progressing, Not Met       Plan     Continue to improve damien LE strength and hip mobility.     Not performed today:  Supine SLR's: 2x5 B  DL Gluteal Bridges: 2x6, 5" holds  Supine Clamshells: 3x6, GTB   LAQ's: 2x7, BlueTB, B  1/2 kneeling hip flexor stretch: 15"x3    Lauren Amaya, PT       "

## 2023-08-14 ENCOUNTER — CLINICAL SUPPORT (OUTPATIENT)
Dept: REHABILITATION | Facility: HOSPITAL | Age: 43
End: 2023-08-14
Attending: INTERNAL MEDICINE
Payer: COMMERCIAL

## 2023-08-14 DIAGNOSIS — M25.551 BILATERAL HIP PAIN: Primary | ICD-10-CM

## 2023-08-14 DIAGNOSIS — M25.552 BILATERAL HIP PAIN: Primary | ICD-10-CM

## 2023-08-14 PROCEDURE — 97110 THERAPEUTIC EXERCISES: CPT | Mod: PN,CQ

## 2023-08-14 NOTE — PROGRESS NOTES
"   [facility]   Physical Therapy Treatment Note      Name: Dede Silver  Clinic Number: [id number]    Therapy Diagnosis:   Encounter Diagnosis   Name Primary?    Bilateral hip pain Yes         Physician: Tim Sawant, *    Visit Date: 8/14/2023     Physician Orders: PT Eval and Treat  Medical Diagnosis from Referral: L40.50 (ICD-10-CM) - Psoriatic arthritis: Evaluate and treat anterior thigh pain / quadriceps tendinitis  Evaluation Date: 5/3/2023  Authorization Period Expiration: 12/31/2023  Plan of Care Expiration: 5/3/2023 to 7/31/2023  Visit # / Visits authorized: 13/20 (+Evaluation)     PTA Visit: 1/5    FOTO: 5/5 NEXT SESSION     Time In: 3:00pm  Time Out: 3:37 pm  Total Billable Time: 37  minutes (3TE)     Precautions: Psoriatic arthritis       Subjective     Pt reports: She is feeling a little bit better today.  She doesn't feel like she's quite out of the recent flare up, but she does feel better than she did last visit.  She still is unable to do much of her HEP on top of her daily activities. She needs to leave early to  her kids from school.     She  was somewhat  compliant with home exercise program.  Response to previous treatment: a little sore  Functional change: NA    Pain: 6/10  Location: bilateral hips      Objective      Objective Measures updated at progress report unless specified.     Treatment     Dede received the treatments listed below:      Dede received the following manual therapy techniques: Joint mobilizations were applied to the: R Hip for 0 minutes, including:  Lateral femoral glides with belt  Inferolateral femoral glides with belt       Dede received therapeutic exercises for 50 to develop strength, endurance, and flexibility for 35 minutes including:  Hip Adductor Isometrics: 45"x2   Bridge 2x10  Supine Clamshells GTB 2x10  Sit to Stands: 4x6, high/low mat          Home Exercises and Patient Education Provided     Education provided: "   - Importance and role of physical therapy  - Proper body mechanics when performing HEP     Written Home Exercises Provided: yes.  Exercises were reviewed and Dede was able to demonstrate them prior to the end of the session.  Dede demonstrated good  understanding of the education provided.      See EMR under Patient Instructions for exercises provided 5/3/2023.     Assessment   Dede presented to PT with reports of decreased pain despite recent flare ups. Resumed previous exercises for improved quad, glute, and hamstring strength.  Dede was able to tolerate  today's session without reports of increased hip pain or discomfort, but required occasional rest breaks secondary to fatigue.  Will assess Dede's response to today' session and progress as tolerated.       Pt prognosis is Fair.   Pt will benefit from skilled outpatient Physical Therapy to address the deficits stated above and in the chart below, provide pt/family education, and to maximize pt's level of independence.      Plan of care discussed with patient: Yes  Pt's spiritual, cultural and educational needs considered and pt agreeable to plan of care and goals as stated below:      Anticipated Barriers for therapy: COVID-19        Goals:   Short Term Goals (6 weeks):  1. Patient to be independent with initial HEP to supplement therapy sessions. Progressing, Not Met  2. Patient to improve impaired mm groups to 75% LSI to improve strength for functional mobility. Progressing, Not Met  3. Patient to report decreased pain at worst to less than or equal to 3/10. Progressing, Not Met     Long Term Goals (12 weeks):   1. Patient to be independent with updated HEP to supplement therapy sessions. Progressing, Not Met  2. Patient to improve impaired mm groups to greater than or equal to 95% LSI to improve strength for functional mobility.Progressing, Not Met  3. Patient to report no pain an B hips. Progressing, Not Met  4. Patient to return to PLOF with no  "pain. Progressing, Not Met       Plan     Continue to improve damien LE strength and hip mobility.     Not performed today:  Supine SLR's: 2x5 B  DL Gluteal Bridges: 2x6, 5" holds  Supine Clamshells: 3x6, GTB   LAQ's: 2x7, BlueTB, B  1/2 kneeling hip flexor stretch: 15"x3    Amirah Ashwin, PTA       "

## 2023-08-19 ENCOUNTER — HOSPITAL ENCOUNTER (OUTPATIENT)
Dept: RADIOLOGY | Facility: HOSPITAL | Age: 43
Discharge: HOME | End: 2023-08-19
Attending: INTERNAL MEDICINE
Payer: COMMERCIAL

## 2023-08-19 DIAGNOSIS — L40.50 PSORIATIC ARTHRITIS: ICD-10-CM

## 2023-08-19 PROCEDURE — 25500020 PHARM REV CODE 255: Performed by: INTERNAL MEDICINE

## 2023-08-19 PROCEDURE — A9585 GADOBUTROL INJECTION: HCPCS | Performed by: INTERNAL MEDICINE

## 2023-08-19 PROCEDURE — 73723 MRI JOINT LWR EXTR W/O&W/DYE: CPT | Mod: TC,RT

## 2023-08-19 PROCEDURE — 73723 MRI JOINT LWR EXTR W/O&W/DYE: CPT | Mod: 26,RT,, | Performed by: RADIOLOGY

## 2023-08-19 PROCEDURE — 73723 MRI HIP W WO CONTRAST RIGHT: ICD-10-PCS | Mod: 26,RT,, | Performed by: RADIOLOGY

## 2023-08-19 RX ORDER — GADOBUTROL 604.72 MG/ML
10 INJECTION INTRAVENOUS
Status: COMPLETED | OUTPATIENT
Start: 2023-08-19 | End: 2023-08-19

## 2023-08-19 RX ADMIN — GADOBUTROL 10 ML: 604.72 INJECTION INTRAVENOUS at 03:08

## 2023-08-23 ENCOUNTER — PATIENT MESSAGE (OUTPATIENT)
Dept: RHEUMATOLOGY | Facility: CLINIC | Age: 43
End: 2023-08-23
Payer: COMMERCIAL

## 2023-08-23 DIAGNOSIS — S76.011A TEAR OF RIGHT GLUTEUS MINIMUS TENDON, INITIAL ENCOUNTER: Primary | ICD-10-CM

## 2023-08-24 ENCOUNTER — CLINICAL SUPPORT (OUTPATIENT)
Dept: REHABILITATION | Facility: HOSPITAL | Age: 43
End: 2023-08-24
Attending: INTERNAL MEDICINE
Payer: COMMERCIAL

## 2023-08-24 DIAGNOSIS — M25.552 BILATERAL HIP PAIN: Primary | ICD-10-CM

## 2023-08-24 DIAGNOSIS — M25.551 BILATERAL HIP PAIN: Primary | ICD-10-CM

## 2023-08-24 PROCEDURE — 97110 THERAPEUTIC EXERCISES: CPT | Mod: PN

## 2023-08-24 NOTE — PROGRESS NOTES
"   [facility]   Physical Therapy Treatment Note      Name: Dede Silver  Clinic Number: [id number]    Therapy Diagnosis:   Encounter Diagnosis   Name Primary?    Bilateral hip pain Yes     Physician: Tim Sawant, *    Visit Date: 8/24/2023     Physician Orders: PT Eval and Treat  Medical Diagnosis from Referral: L40.50 (ICD-10-CM) - Psoriatic arthritis: Evaluate and treat anterior thigh pain / quadriceps tendinitis  Evaluation Date: 5/3/2023  Authorization Period Expiration: 12/31/2023  Plan of Care Expiration: 5/3/2023 to 7/31/2023  Visit # / Visits authorized: 15/20 (+Evaluation)     PTA Visit: 0/5    FOTO: 5/5 NEXT SESSION     Time In: 3:00 PM  Time Out: 3:35 PM  Total Billable Time: 35 minutes     Precautions: Psoriatic arthritis       Subjective     Pt reports: She recently had her MRI that revealed a partial thickness tear of her R glute minimus. She is scheduled to see an orthopedic specialist next week and will proceed with treatment following that appointment. Overall feels as though the isometrics have helped the most since beginning therapy.     She  was somewhat  compliant with home exercise program.  Response to previous treatment: a little sore  Functional change: NA    Pain: 6/10  Location: bilateral hips      Objective      Objective Measures updated at progress report unless specified.     Treatment     Dede received the treatments listed below:      Dede received therapeutic exercises to develop strength, endurance, and flexibility for 35 minutes including:  Review and heavy patient education  Hip Abductor Isometrics: 45"x4 - patient needed to leave early and could not finish 5th round  Hooklying Hip Adduction ball squeezes during 2 minute rest break for isometrics above       Home Exercises and Patient Education Provided     Education provided:   - Importance and role of physical therapy  - Proper body mechanics when performing HEP     Written Home Exercises " Provided: yes.  Exercises were reviewed and Dede was able to demonstrate them prior to the end of the session.  Dede demonstrated good  understanding of the education provided.      See EMR under Patient Instructions for exercises provided 5/3/2023.     Assessment     Dede presented to PT with reports of a partial thickness R glute minimus tear detected on her MRI. States that the clamshells have caused the most pain throughout therapy and the isometrics have had the most relief throughout therapy. Session limited due to patient needing to leave early. Hip abduction isometrics performed this session with slight decrease in pain noted following performance. Educated patient to continue isometrics for the next 2 weeks until next therapy session or orthopedic visit next week. Patient to return to clinic in ~2 weeks.        Pt prognosis is Fair.   Pt will benefit from skilled outpatient Physical Therapy to address the deficits stated above and in the chart below, provide pt/family education, and to maximize pt's level of independence.      Plan of care discussed with patient: Yes  Pt's spiritual, cultural and educational needs considered and pt agreeable to plan of care and goals as stated below:      Anticipated Barriers for therapy: COVID-19        Goals:   Short Term Goals (6 weeks):  1. Patient to be independent with initial HEP to supplement therapy sessions. Progressing, Not Met  2. Patient to improve impaired mm groups to 75% LSI to improve strength for functional mobility. Progressing, Not Met  3. Patient to report decreased pain at worst to less than or equal to 3/10. Progressing, Not Met     Long Term Goals (12 weeks):   1. Patient to be independent with updated HEP to supplement therapy sessions. Progressing, Not Met  2. Patient to improve impaired mm groups to greater than or equal to 95% LSI to improve strength for functional mobility.Progressing, Not Met  3. Patient to report no pain an B hips.  "Progressing, Not Met  4. Patient to return to PLOF with no pain. Progressing, Not Met       Plan     Continue to improve damien LE strength and hip mobility.     Not performed today:  Supine SLR's: 2x5 B  DL Gluteal Bridges: 2x6, 5" holds  Supine Clamshells: 3x6, GTB   LAQ's: 2x7, BlueTB, B  1/2 kneeling hip flexor stretch: 15"x3    Lauren Amaya, PT       "

## 2023-08-25 NOTE — PROGRESS NOTES
Subjective:     Dede Silver     Chief Complaint   Patient presents with    GLUTEUS     RIGHT SIDE       HPI    Dede is a 42 y.o. female coming in today for right hip pain that began about 14 month(s) ago, referred by Dr. Sawant. Pt. describes the pain as a 5/10 achy pain that does not radiate. There was not a fall/injury/ or trauma associated with the onset of symptoms. Pt initially noted pain after a 16hr plane ride home from [place]. She has tried NSAIDs and PT. Initially she had some relief with this, but the pain has worsened over the past 2 months. Pt notes she is unable to stand or walk for more than 5-10. The pain is better with rest, sitting, NSAIDs (celebrex, currently taking 1-2x daily) and worse with standing, walking, lying on right side.  Of note, patient has history of psoriatic arthritis. Pt. Denies any other musculoskeletal complaints at this time.     Joint instability? no  Mechanical locking/clicking? no  Affecting ADL's? yes  Affecting sleep? 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 No pertinent surgical history.  FAMILY HISTORY:   Family History   Problem Relation Age of Onset    Melanoma Neg Hx      SOCIAL HISTORY:   Social History     Socioeconomic History    Marital status:    Tobacco Use    Smoking status: Never    Smokeless tobacco: Never   Substance and Sexual Activity    Alcohol use: No     MEDICATIONS:   Current Outpatient Medications:     celecoxib (CELEBREX) 200 MG capsule, Take 1 capsule (200 mg total) by mouth 2 (two) times daily as needed (arthritis)., Disp: 60 capsule, Rfl: 2    clobetasoL (TEMOVATE) 0.05 % external solution, Apply topically 2 (two) times daily. (Patient taking differently: Apply topically 2 (two) times daily.), Disp: 50 mL, Rfl: 3    folic acid  "(FOLVITE) 1 MG tablet, Take 1 tablet (1 mg total) by mouth once daily., Disp: 90 tablet, Rfl: 3    insulin syringe-needle U-100 (BD INSULIN SYRINGE) 1 mL 25 gauge x 5/8" Syrg, 0.6 mLs by Misc.(Non-Drug; Combo Route) route once a week., Disp: 12 each, Rfl: 3    ketoconazole (NIZORAL) 2 % shampoo, Apply topically twice a week. To scalp x 5 minutes, then rinse, Disp: 120 mL, Rfl: 5    methotrexate 2.5 MG Tab, Take 8 tablets (20 mg total) by mouth every 7 days., Disp: 40 tablet, Rfl: 2    pantoprazole (PROTONIX) 40 MG tablet, Take 1 tablet (40 mg total) by mouth once daily., Disp: 30 tablet, Rfl: 11    sulfaSALAzine (AZULFIDINE) 500 mg Tab, Take 3 tablets (1,500 mg total) by mouth 2 (two) times a day., Disp: 180 tablet, Rfl: 2  ALLERGIES: Review of patient's allergies indicates:  No Known Allergies    Reviewed office visit on 23 with Dr. Sawant: Psoriatic arthritis   Doing ok except for R hip with pain out of proportion to other joints; ant groin pain, some radiation to side; disrupts sleep and limits walking and standing       IMAGIN. X-ray ordered due to right hip pain. (AP pelvis and frogleg lateral  right views) taken 3/21/23.   2. X-ray images were reviewed personally by me and then directly with patient.  3. FINDINGS: Osseous structures appear intact without evidence of fracture or osseous destructive process.  No apparent dislocation. No advanced degenerative change or significant joint space narrowing. IUD in the pelvis.  4. IMPRESSION: No evidence of a displaced fracture or dislocation    1. MRI w wo contrast ordered due to right hip pain taken 23.   2. MRI images were reviewed personally by me and then directly with patient.  3. FINDINGS: Osseous Structures: No fracture, or avascular necrosis.No marrow signal abnormality to suggest bone marrow replacement process. Hip and Sacroiliac joints: No evidence of joint effusion.  Likely chronic tear of the anterosuperior labrum without paralabral cyst " "with low signal intensity at that level.  No significant degenerative changes or focal articular cartilage loss. Bursae: No trochanteric or iliopsoas bursitis. Soft Tissues: Near complete insertional tear of gluteus minimus tendon without retraction, at the level of the anterior facet greater trochanter.  Associated bone marrow edema at this level.  Findings are consistent with greater trochanteric pain syndrome.  Gluteus medius tendon is intact.  4. IMPRESSION: No evidence for AVN of the hip or occult fracture. Partial-thickness tear RIGHT gluteus minimus insertion upon the anterior facet greater trochanter without retraction.  Associated adjacent bone marrow edema.    Objective:     VITAL SIGNS: /80   Pulse 79   Ht 5' 3" (1.6 m)   Wt 94 kg (207 lb 3.7 oz)   BMI 36.71 kg/m²    General    Nursing note and vitals reviewed.  Constitutional: She is oriented to person, place, and time. She appears well-developed and well-nourished.   HENT:   Head: Normocephalic and atraumatic.   no nasal discharge, no external ear redness or discharge   Eyes:   EOM is full and smooth  No eye redness or discharge   Neck: Neck supple. No tracheal deviation present.   Cardiovascular:  Normal rate.            2+ Radial pulse bilaterally  2+ Dorsalis Pedis pulse bilaterally  No LE edema appreciated   Pulmonary/Chest: Effort normal. No respiratory distress.   Abdominal: She exhibits no distension.   No rigidity   Neurological: She is alert and oriented to person, place, and time. She exhibits normal muscle tone. Coordination normal.   See details below   Psychiatric: She has a normal mood and affect. Her behavior is normal.               MUSCULOSKELETAL EXAM  HIP: right HIP  The affected hip is compared to the contralateral hip.    Observation:    There is no edema, erythema, or ecchymosis in the lumbosacral region.   There is no Trendelenburg sign on either side  + obvious pelvic obliquity while standing - improved with R 5 mm heel " lift  No thoracolumbar scoliosis observed.    No midline skin abnormalities.  No atrophy noted in the lower limbs.  Gait: Right antalgic with Neutral ankle mechanics and Neutral medial arch    ROM (* = with pain):  Passive hip flexion to 120° on left and 120° on right  Passive hip internal rotation to 45° on left and 45° on right*  Passive hip external rotation to 45° on left and 45° on right   Passive hip abduction to 45° on left and 45° on right  All motions above are without pain.    Tenderness To Palpation:  No tenderness at the ASIS, AIIS, PSIS, PIIS, iliac crest, pubic bones, ischial tuberosity.  No tenderness throughout the lumbar spine, iliolumbar region, or posterior pelvis.  No tenderness throughout the sacrum or piriformis  No tenderness over the greater trochanteric bursa or greater/lesser trochanters.  + tenderness at the glut attachments on the greater trochanter  No tenderness over proximal IT band or hip flexor musculature.    Strength Testing (* = with pain):  Hip flexion - 5/5 on left and 5/5 on right  Hip extension - 5/5 on left and 5/5 on right  Hip adduction - 5/5 on left and 5/5 on right  Hip abduction - 5/5 on left and 5/5 on right  Knee flexion - 5/5 on left and 5/5 on right  Knee extension - 5/5 on left and 5/5 on right  Glutaeus medius - 5-/5 on left and 5/-5 on right with compensatory lumbar rotation    Special Tests:  Standing Trendelenburg test - negative    Seated straight leg raise - negative  Supine straight leg raise - negative  Hamstring flexibility symmetric    Log roll - negative  DEIRDRE - negative  FADIR - negative  Scour test - negative  No pain with posterior hip capsule compression    ASIS compression test - negative  SI drawer test - negative   Thigh thrust test - negative     Piriformis test (Bonnet's) - negative  Ely's test - negative  Quadriceps flexibility symmetric.  Vikash test - negative  Mateo compression test - negative    Fulcrum test - negative    + right short leg -  corrected with R 5 mm heel lift    Neurovascular Exam:  Normal gait without Trendelenburg or antalgia.  2+ femoral, DP, and PT pulses BL.  No skin changes, no abnormal hair distribution.  Sensation intact to light touch throughout the obturator and medial/lateral/posterior femoral cutaneous nerves.  2+/4 reflexes at L4 and S1 dermatomes  Capillary refill intact to <2 seconds in all lower limb digits.    TART (Tissue texture abnormality, Asymmetry,  Restriction of motion and/or Tenderness) changes:     Lumbar Spine   L1 Neutral   L2 Neutral   L3 Neutral   L4 FRS RIGHT   L5 FRS RIGHT     Pelvis:  Innominate:Right anterior rotation  Pubic bone:Right inferior pubic shear    Sacrum:Right unilateral    Lower extremity: Right lateral hip Herniated trigger point (HTP) fascial distortion     Key   F= Flexed   E = Extended   R = Rotated   S = Sidebent   TTA = tissue texture abnormality     Assessment:      Encounter Diagnoses   Name Primary?    Tear of right gluteus minimus tendon, initial encounter Yes    Lateral pain of right hip     Acquired short leg syndrome on right     Myalgia     Somatic dysfunction of lumbar region     Sacral region somatic dysfunction     Somatic dysfunction of pelvic region     Somatic dysfunction of lower extremity           Plan:   1. Right lateral hip pain secondary to gluteal tendinosis with associated right gluteus minimus partial tearing, as noted on recent right hip MRI. Underlying  poor pelvic/core stability with compensatory muscle firing patterns and right short-leg also likely contributing to increase lateral hip strain and biomechanical restrictions of the lower kinetic chain  - recommend continuing Celebrex 200 mg p.o. once twice a day as needed for pain control  - recomend Ice up to 20 minutes at a time prn for pain control  - recommend continuing formal physical therapy ([facility]) as planned  - OMT performed today to address biomechanical contributions to pain  - HEP  started  - recommend right 5 mm heel lift wear for underlying right short-leg  - plan for diagnostic musculoskeletal ultrasound of the right lateral hip for further evaluation of gluteal tendinopathy as next step if symptoms persist with discussion of possible future ultrasound-guided PRP injection for gluteus minimus partial tearing.   - EDUCATION FOR PRP:    Education provided on the benefits, adverse effects, likely course of treatment and improvement, and post-injection expectations from PRP.  It generally takes 1-3 treatments to have long standing resolution. We reviewed that initially after treatment, pain may become worse and this is an expected response. We instructed that improvement may be experienced within the first two weeks and that most of the patient's improvement will come between weeks 6 and 12. If there is no improvement, we would not repeat. If less than 90% improvement is experienced at 12 weeks, we would likely repeat.Dede was given a PRP information handout.     After face to face conversation, Dede expressed understanding that $350 vs. $700 is due at time of check in for ACP vs. PRP procedure. Patient is aware that this procedure is not covered by insurance companies and does not qualify for financial assistance. Payment can be made via cash, check, debit or credit card.   -  X-ray images of right hip taken 3/21/23 (AP pelvis and frogleg lateral  right views) showed no abnormalities. Images were personally reviewed with patient.  - MRI images of right hip taken 8/19/23 showed No evidence for AVN of the hip or occult fracture. Partial-thickness tear RIGHT gluteus minimus insertion upon the anterior facet greater trochanter without retraction.  Associated adjacent bone marrow edema. Images were personally reviewed with patient.     2. OMT 3-4 regions. Oral consent obtained.  Reviewed benefits and potential side effects, including bruising at site of treatment and increased soreness for the  next 24-48 hours. Pt. Instructed to increased water intake by 1 L today and tomorrow to help with any residual soreness.   - OMT indicated today due to signs and symptoms as well as local and remote somatic dysfunction findings and their related neurokinetic, lymphatic, fascial and/or arteriovenous body connections.   - OMT techniques used: Myofascial Release, Muscle Energy, and Fascial Distortion Model   - Treatment was tolerated well. Improvement noted in segmental mobility post-treatment in dysfunctional regions. There were no adverse events and no complications immediately following treatment.     3. Pt. Given the following HEP:  A)  Pelvic clock exercises given to do from the 6-12 o'clock positions:10-15 reps, twice daily. Hand out of exercise also given.   B) Clam shell exercises bilaterally: hold leg in abducted and externally rotated position for 5-10 seconds, repeat 5-10 times  C) Lower abdominal muscle isotonic exercises: Rotate pelvis into the 6 o'clock position and holding it to engage lower abdominal muscles. Then lift up leg, one at a time, alternating for 10-15 reps. Repeat exercises 1-2 times daily. Handout given.     19809 HOME EXERCISE PROGRAM (HEP):  The patient was taught a homegoing physical therapy regimen as described above. The patient demonstrated understanding of the exercises and proper technique of their execution. This interaction took 15 minutes.     4. Follow-up in 3-4 weeks for reevaluation    5. Patient agreeable to today's plan and all questions were answered    This note is dictated using the M*Modal Fluency Direct word recognition program. There are word recognition mistakes that are occasionally missed on review.

## 2023-08-29 ENCOUNTER — OFFICE VISIT (OUTPATIENT)
Dept: SPORTS MEDICINE | Facility: CLINIC | Age: 43
End: 2023-08-29
Payer: COMMERCIAL

## 2023-08-29 VITALS
WEIGHT: 207.25 LBS | DIASTOLIC BLOOD PRESSURE: 80 MMHG | SYSTOLIC BLOOD PRESSURE: 116 MMHG | HEART RATE: 79 BPM | BODY MASS INDEX: 36.72 KG/M2 | HEIGHT: 63 IN

## 2023-08-29 DIAGNOSIS — M99.04 SACRAL REGION SOMATIC DYSFUNCTION: ICD-10-CM

## 2023-08-29 DIAGNOSIS — M99.05 SOMATIC DYSFUNCTION OF PELVIC REGION: ICD-10-CM

## 2023-08-29 DIAGNOSIS — M25.551 LATERAL PAIN OF RIGHT HIP: ICD-10-CM

## 2023-08-29 DIAGNOSIS — M99.03 SOMATIC DYSFUNCTION OF LUMBAR REGION: ICD-10-CM

## 2023-08-29 DIAGNOSIS — S76.011A TEAR OF RIGHT GLUTEUS MINIMUS TENDON, INITIAL ENCOUNTER: Primary | ICD-10-CM

## 2023-08-29 DIAGNOSIS — M21.70 ACQUIRED SHORT LEG SYNDROME ON RIGHT: ICD-10-CM

## 2023-08-29 DIAGNOSIS — M79.10 MYALGIA: ICD-10-CM

## 2023-08-29 DIAGNOSIS — M99.06 SOMATIC DYSFUNCTION OF LOWER EXTREMITY: ICD-10-CM

## 2023-08-29 PROCEDURE — 1159F PR MEDICATION LIST DOCUMENTED IN MEDICAL RECORD: ICD-10-PCS | Mod: CPTII,S$GLB,, | Performed by: NEUROMUSCULOSKELETAL MEDICINE & OMM

## 2023-08-29 PROCEDURE — 1160F RVW MEDS BY RX/DR IN RCRD: CPT | Mod: CPTII,S$GLB,, | Performed by: NEUROMUSCULOSKELETAL MEDICINE & OMM

## 2023-08-29 PROCEDURE — 3079F DIAST BP 80-89 MM HG: CPT | Mod: CPTII,S$GLB,, | Performed by: NEUROMUSCULOSKELETAL MEDICINE & OMM

## 2023-08-29 PROCEDURE — 99204 PR OFFICE/OUTPT VISIT, NEW, LEVL IV, 45-59 MIN: ICD-10-PCS | Mod: 25,S$GLB,, | Performed by: NEUROMUSCULOSKELETAL MEDICINE & OMM

## 2023-08-29 PROCEDURE — 3008F PR BODY MASS INDEX (BMI) DOCUMENTED: ICD-10-PCS | Mod: CPTII,S$GLB,, | Performed by: NEUROMUSCULOSKELETAL MEDICINE & OMM

## 2023-08-29 PROCEDURE — 1160F PR REVIEW ALL MEDS BY PRESCRIBER/CLIN PHARMACIST DOCUMENTED: ICD-10-PCS | Mod: CPTII,S$GLB,, | Performed by: NEUROMUSCULOSKELETAL MEDICINE & OMM

## 2023-08-29 PROCEDURE — 97110 PR THERAPEUTIC EXERCISES: ICD-10-PCS | Mod: S$GLB,,, | Performed by: NEUROMUSCULOSKELETAL MEDICINE & OMM

## 2023-08-29 PROCEDURE — 1159F MED LIST DOCD IN RCRD: CPT | Mod: CPTII,S$GLB,, | Performed by: NEUROMUSCULOSKELETAL MEDICINE & OMM

## 2023-08-29 PROCEDURE — 99999 PR PBB SHADOW E&M-EST. PATIENT-LVL IV: CPT | Mod: PBBFAC,,, | Performed by: NEUROMUSCULOSKELETAL MEDICINE & OMM

## 2023-08-29 PROCEDURE — 3008F BODY MASS INDEX DOCD: CPT | Mod: CPTII,S$GLB,, | Performed by: NEUROMUSCULOSKELETAL MEDICINE & OMM

## 2023-08-29 PROCEDURE — 3074F PR MOST RECENT SYSTOLIC BLOOD PRESSURE < 130 MM HG: ICD-10-PCS | Mod: CPTII,S$GLB,, | Performed by: NEUROMUSCULOSKELETAL MEDICINE & OMM

## 2023-08-29 PROCEDURE — 98926 PR OSTEOPATHIC MANIP,3-4 BODY REGN: ICD-10-PCS | Mod: S$GLB,,, | Performed by: NEUROMUSCULOSKELETAL MEDICINE & OMM

## 2023-08-29 PROCEDURE — 97110 THERAPEUTIC EXERCISES: CPT | Mod: S$GLB,,, | Performed by: NEUROMUSCULOSKELETAL MEDICINE & OMM

## 2023-08-29 PROCEDURE — 3079F PR MOST RECENT DIASTOLIC BLOOD PRESSURE 80-89 MM HG: ICD-10-PCS | Mod: CPTII,S$GLB,, | Performed by: NEUROMUSCULOSKELETAL MEDICINE & OMM

## 2023-08-29 PROCEDURE — 99204 OFFICE O/P NEW MOD 45 MIN: CPT | Mod: 25,S$GLB,, | Performed by: NEUROMUSCULOSKELETAL MEDICINE & OMM

## 2023-08-29 PROCEDURE — 99999 PR PBB SHADOW E&M-EST. PATIENT-LVL IV: ICD-10-PCS | Mod: PBBFAC,,, | Performed by: NEUROMUSCULOSKELETAL MEDICINE & OMM

## 2023-08-29 PROCEDURE — 98926 OSTEOPATH MANJ 3-4 REGIONS: CPT | Mod: S$GLB,,, | Performed by: NEUROMUSCULOSKELETAL MEDICINE & OMM

## 2023-08-29 PROCEDURE — 3074F SYST BP LT 130 MM HG: CPT | Mod: CPTII,S$GLB,, | Performed by: NEUROMUSCULOSKELETAL MEDICINE & OMM

## 2023-08-31 DIAGNOSIS — L40.9 SCALP PSORIASIS: ICD-10-CM

## 2023-08-31 DIAGNOSIS — Z79.899 HIGH RISK MEDICATION USE: ICD-10-CM

## 2023-09-01 RX ORDER — FOLIC ACID 1 MG/1
1 TABLET ORAL DAILY
Qty: 90 TABLET | Refills: 3 | Status: SHIPPED | OUTPATIENT
Start: 2023-09-01 | End: 2024-08-31

## 2023-09-06 RX ORDER — KETOCONAZOLE 20 MG/ML
SHAMPOO, SUSPENSION TOPICAL
Qty: 120 ML | Refills: 5 | Status: SHIPPED | OUTPATIENT
Start: 2023-09-07

## 2023-09-08 ENCOUNTER — CLINICAL SUPPORT (OUTPATIENT)
Dept: REHABILITATION | Facility: HOSPITAL | Age: 43
End: 2023-09-08
Attending: INTERNAL MEDICINE
Payer: COMMERCIAL

## 2023-09-08 DIAGNOSIS — M25.551 BILATERAL HIP PAIN: Primary | ICD-10-CM

## 2023-09-08 DIAGNOSIS — M25.552 BILATERAL HIP PAIN: Primary | ICD-10-CM

## 2023-09-08 PROCEDURE — 97112 NEUROMUSCULAR REEDUCATION: CPT | Mod: PN

## 2023-09-08 PROCEDURE — 97110 THERAPEUTIC EXERCISES: CPT | Mod: PN

## 2023-09-08 NOTE — PROGRESS NOTES
"   [facility]   Physical Therapy Treatment Note      Name: Dede Silver  Clinic Number: [id number]    Therapy Diagnosis:   Encounter Diagnosis   Name Primary?    Bilateral hip pain Yes     Physician: Tim Sawant, *    Visit Date: 9/8/2023     Physician Orders: PT Eval and Treat  Medical Diagnosis from Referral: L40.50 (ICD-10-CM) - Psoriatic arthritis: Evaluate and treat anterior thigh pain / quadriceps tendinitis  Evaluation Date: 5/3/2023  Authorization Period Expiration: 12/31/2023  Plan of Care Expiration: 5/3/2023 to 7/31/2023 - update POC next session  Visit # / Visits authorized: 16/20 (+Evaluation)     PTA Visit: 0/5    FOTO: 5/5 NEXT SESSION     Time In: 7:00 AM  Time Out: 7:55 AM  Total Billable Time: 55 minutes     Precautions: Psoriatic arthritis       Subjective     Pt reports: She saw Dr. Alfaro at [place] and feels as though some of her pain has improved as she was able to sleep on her R side for a short amount of time prior to the onset of pain. Was instructed to focus on lower core activation and strength as well as hip abductor strengthening.     She  was somewhat  compliant with home exercise program.  Response to previous treatment: a little sore  Functional change: NA    Pain: 6/10  Location: bilateral hips      Objective      Obtain next session.      Treatment     Dede received the treatments listed below:      Dede received therapeutic exercises to develop strength, endurance, and flexibility for 10 minutes including:  Pelvic Tilts: 30x, 5" holds  Patient education    neuromuscular re-education activities to improve: Coordination, Proprioception, and Posture for 45 minutes. The following activities were included:  TA Contractions: 3x10, 5" holds  TA Contractions with marches: 3x10, alternating marches  Dead Bugs (arms only): 4x5, alternating arms  Hip Abductor Isometrics: 45"x5 with the following exercises performed during 2 minute rest breaks:   Hip " adduction ball squeezes   DL Gluteal bridges with adduction ball squeeze      Home Exercises and Patient Education Provided     Education provided:   - Importance and role of physical therapy  - Proper body mechanics when performing HEP     Written Home Exercises Provided: yes.  Exercises were reviewed and Dede was able to demonstrate them prior to the end of the session.  Dede demonstrated good  understanding of the education provided.      See EMR under Patient Instructions for exercises provided 5/3/2023.     Assessment     Dede presented to PT following an appointment with ortho in which she reports she was instructed to focus on strengthening her lower abdominals and hip abductors. Also states that she had her SIJ unlocked and feels much better. Session today focused on ensuring proper mechanics for lower core strengthening and making sure patient was not compensating. Upon initial performance of exercises patient often held her breath and required cueing for proper core engagement; overall it improved by end of session, but she continues to require constant cueing. Also continued with glute med and min activation with supine hip abductor isometrics with good tolerance. Will continue to progress core and hip abductor strength as tolerated.        Pt prognosis is Fair.   Pt will benefit from skilled outpatient Physical Therapy to address the deficits stated above and in the chart below, provide pt/family education, and to maximize pt's level of independence.      Plan of care discussed with patient: Yes  Pt's spiritual, cultural and educational needs considered and pt agreeable to plan of care and goals as stated below:      Anticipated Barriers for therapy: COVID-19        Goals:   Short Term Goals (6 weeks):  1. Patient to be independent with initial HEP to supplement therapy sessions. Progressing, Not Met  2. Patient to improve impaired mm groups to 75% LSI to improve strength for functional mobility.  "Progressing, Not Met  3. Patient to report decreased pain at worst to less than or equal to 3/10. Progressing, Not Met     Long Term Goals (12 weeks):   1. Patient to be independent with updated HEP to supplement therapy sessions. Progressing, Not Met  2. Patient to improve impaired mm groups to greater than or equal to 95% LSI to improve strength for functional mobility.Progressing, Not Met  3. Patient to report no pain an B hips. Progressing, Not Met  4. Patient to return to PLOF with no pain. Progressing, Not Met       Plan     Continue to improve damien LE strength and hip mobility.     Not performed today:  Supine SLR's: 2x5 B  DL Gluteal Bridges: 2x6, 5" holds  Supine Clamshells: 3x6, GTB   LAQ's: 2x7, BlueTB, B  1/2 kneeling hip flexor stretch: 15"x3    Lauren Amaya, PT       "

## 2023-09-08 NOTE — PATIENT INSTRUCTIONS
"TA Contractions    Lay on your back with your knees bent and feet flat on the floor/bed. Push your low back into the floor/bed so that all of your back is making contact with the surface. Draw your bellybutton slightly in and up towards your spine. You should be able to breathe while maintaining this contraction.      Hold for 5 seconds and relax. Perform 2-3 sets of 10 repetitions.      TA Contractions with marching    -laying on your back, perform a transverse abdominis (TA) contraction as previously taught  -raise the legs to where both the hips and knees are bent at 90 degree angles  -keeping the contraction, slowly march with one leg, and lightly touch the table  -slowly return back to the original position with knees and hips bent, then perform the other leg     Note: the further you march your leg out from your body, the tougher and more progressed the exercise is.      Perform 2-3 sets of 10 repetitions, alternating.      Bridges    While lying on your back with knees bent, tighten your lower abdominal muscles, squeeze your buttocks and then raise your buttocks off the floor/bed as creating a "Bridge" with your body. Hold and then lower yourself and repeat.     Hold for 5 seconds and relax. Perform 2-3 sets of 10 repetitions.      Supine Straight Leg Raises    While lying on your back, raise up your leg with a straight knee.  Keep the opposite knee bent with the foot planted on the ground. Perform 3 sets of 10 repetitions.   "

## 2023-09-25 ENCOUNTER — OFFICE VISIT (OUTPATIENT)
Dept: SPORTS MEDICINE | Facility: CLINIC | Age: 43
End: 2023-09-25
Payer: COMMERCIAL

## 2023-09-25 VITALS
BODY MASS INDEX: 36.68 KG/M2 | SYSTOLIC BLOOD PRESSURE: 114 MMHG | DIASTOLIC BLOOD PRESSURE: 69 MMHG | HEIGHT: 63 IN | WEIGHT: 207 LBS

## 2023-09-25 DIAGNOSIS — S76.011D TEAR OF RIGHT GLUTEUS MINIMUS TENDON, SUBSEQUENT ENCOUNTER: Primary | ICD-10-CM

## 2023-09-25 DIAGNOSIS — M99.04 SACRAL REGION SOMATIC DYSFUNCTION: ICD-10-CM

## 2023-09-25 DIAGNOSIS — M99.06 SOMATIC DYSFUNCTION OF LOWER EXTREMITY: ICD-10-CM

## 2023-09-25 DIAGNOSIS — M99.03 SOMATIC DYSFUNCTION OF LUMBAR REGION: ICD-10-CM

## 2023-09-25 DIAGNOSIS — M21.70 ACQUIRED SHORT LEG SYNDROME ON RIGHT: ICD-10-CM

## 2023-09-25 DIAGNOSIS — M99.02 SOMATIC DYSFUNCTION OF THORACIC REGION: ICD-10-CM

## 2023-09-25 DIAGNOSIS — M79.10 MYALGIA: ICD-10-CM

## 2023-09-25 DIAGNOSIS — M25.551 LATERAL PAIN OF RIGHT HIP: ICD-10-CM

## 2023-09-25 PROCEDURE — 3008F PR BODY MASS INDEX (BMI) DOCUMENTED: ICD-10-PCS | Mod: CPTII,S$GLB,, | Performed by: NEUROMUSCULOSKELETAL MEDICINE & OMM

## 2023-09-25 PROCEDURE — 1159F PR MEDICATION LIST DOCUMENTED IN MEDICAL RECORD: ICD-10-PCS | Mod: CPTII,S$GLB,, | Performed by: NEUROMUSCULOSKELETAL MEDICINE & OMM

## 2023-09-25 PROCEDURE — 99214 OFFICE O/P EST MOD 30 MIN: CPT | Mod: 25,S$GLB,, | Performed by: NEUROMUSCULOSKELETAL MEDICINE & OMM

## 2023-09-25 PROCEDURE — 99999 PR PBB SHADOW E&M-EST. PATIENT-LVL III: ICD-10-PCS | Mod: PBBFAC,,, | Performed by: NEUROMUSCULOSKELETAL MEDICINE & OMM

## 2023-09-25 PROCEDURE — 3074F PR MOST RECENT SYSTOLIC BLOOD PRESSURE < 130 MM HG: ICD-10-PCS | Mod: CPTII,S$GLB,, | Performed by: NEUROMUSCULOSKELETAL MEDICINE & OMM

## 2023-09-25 PROCEDURE — 3078F PR MOST RECENT DIASTOLIC BLOOD PRESSURE < 80 MM HG: ICD-10-PCS | Mod: CPTII,S$GLB,, | Performed by: NEUROMUSCULOSKELETAL MEDICINE & OMM

## 2023-09-25 PROCEDURE — 98926 OSTEOPATH MANJ 3-4 REGIONS: CPT | Mod: S$GLB,,, | Performed by: NEUROMUSCULOSKELETAL MEDICINE & OMM

## 2023-09-25 PROCEDURE — 98926 PR OSTEOPATHIC MANIP,3-4 BODY REGN: ICD-10-PCS | Mod: S$GLB,,, | Performed by: NEUROMUSCULOSKELETAL MEDICINE & OMM

## 2023-09-25 PROCEDURE — 3078F DIAST BP <80 MM HG: CPT | Mod: CPTII,S$GLB,, | Performed by: NEUROMUSCULOSKELETAL MEDICINE & OMM

## 2023-09-25 PROCEDURE — 3008F BODY MASS INDEX DOCD: CPT | Mod: CPTII,S$GLB,, | Performed by: NEUROMUSCULOSKELETAL MEDICINE & OMM

## 2023-09-25 PROCEDURE — 1159F MED LIST DOCD IN RCRD: CPT | Mod: CPTII,S$GLB,, | Performed by: NEUROMUSCULOSKELETAL MEDICINE & OMM

## 2023-09-25 PROCEDURE — 3074F SYST BP LT 130 MM HG: CPT | Mod: CPTII,S$GLB,, | Performed by: NEUROMUSCULOSKELETAL MEDICINE & OMM

## 2023-09-25 PROCEDURE — 99999 PR PBB SHADOW E&M-EST. PATIENT-LVL III: CPT | Mod: PBBFAC,,, | Performed by: NEUROMUSCULOSKELETAL MEDICINE & OMM

## 2023-09-25 PROCEDURE — 99214 PR OFFICE/OUTPT VISIT, EST, LEVL IV, 30-39 MIN: ICD-10-PCS | Mod: 25,S$GLB,, | Performed by: NEUROMUSCULOSKELETAL MEDICINE & OMM

## 2023-09-25 NOTE — PROGRESS NOTES
Subjective:     Dede Silver     Chief Complaint   Patient presents with    Follow-up     HPI    Dede is a 42 y.o. female coming in today for right hip pain. Since last visit the pain has Improved. The pain is better with rest, sitting, NSAIDs (celebrex, currently taking 1-2x daily) and worse with standing, walking, lying on right side.  She is not been to formal physical therapy, but continues to do her exercise at home.  Of note, patient has history of psoriatic arthritis, noting a recent flare of this.  Patient notes that she was unable to wear the recommended right heel lift, due to increased pain with this. Pt. describes the pain as a 4/10 currently.There has not been any new a fall/injury/ or traumas since last visit.  Pt. denies any new musculoskeletal complaints at this time.     Office note from 8/29/23 reviewed    Joint instability? no  Mechanical locking/clicking? no  Affecting ADL's? yes  Affecting sleep? 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 No pertinent surgical history.  FAMILY HISTORY:   Family History   Problem Relation Age of Onset    Melanoma Neg Hx      SOCIAL HISTORY:   Social History     Socioeconomic History    Marital status:    Tobacco Use    Smoking status: Never    Smokeless tobacco: Never   Substance and Sexual Activity    Alcohol use: No     MEDICATIONS:   Current Outpatient Medications:     celecoxib (CELEBREX) 200 MG capsule, Take 1 capsule (200 mg total) by mouth 2 (two) times daily as needed (arthritis)., Disp: 60 capsule, Rfl: 2    clobetasoL (TEMOVATE) 0.05 % external solution, Apply topically 2 (two) times daily. (Patient taking differently: Apply topically 2 (two) times daily.), Disp: 50 mL, Rfl: 3    folic acid (FOLVITE) 1 MG tablet, Take 1 tablet (1 mg  "total) by mouth once daily., Disp: 90 tablet, Rfl: 3    insulin syringe-needle U-100 (BD INSULIN SYRINGE) 1 mL 25 gauge x 5/8" Syrg, 0.6 mLs by Misc.(Non-Drug; Combo Route) route once a week., Disp: 12 each, Rfl: 3    ketoconazole (NIZORAL) 2 % shampoo, Apply topically twice a week. To scalp x 5 minutes, then rinse, Disp: 120 mL, Rfl: 5    methotrexate 2.5 MG Tab, Take 8 tablets (20 mg total) by mouth every 7 days., Disp: 40 tablet, Rfl: 2    pantoprazole (PROTONIX) 40 MG tablet, Take 1 tablet (40 mg total) by mouth once daily., Disp: 30 tablet, Rfl: 11    sulfaSALAzine (AZULFIDINE) 500 mg Tab, Take 3 tablets (1,500 mg total) by mouth 2 (two) times a day., Disp: 180 tablet, Rfl: 2  ALLERGIES: Review of patient's allergies indicates:  No Known Allergies    Objective:     VITAL SIGNS: /69   Ht 5' 3" (1.6 m)   Wt 93.9 kg (207 lb)   BMI 36.67 kg/m²    General    Nursing note and vitals reviewed.  Constitutional: She is oriented to person, place, and time. She appears well-developed and well-nourished.   HENT:   Head: Normocephalic and atraumatic.   no nasal discharge, no external ear redness or discharge   Eyes:   EOM is full and smooth  No eye redness or discharge   Neck: Neck supple. No tracheal deviation present.   Cardiovascular:  Normal rate.            2+ Radial pulse bilaterally  2+ Dorsalis Pedis pulse bilaterally  No LE edema appreciated   Pulmonary/Chest: Effort normal. No respiratory distress.   Abdominal: She exhibits no distension.   No rigidity   Neurological: She is alert and oriented to person, place, and time. She exhibits normal muscle tone. Coordination normal.   See details below   Psychiatric: She has a normal mood and affect. Her behavior is normal.           MUSCULOSKELETAL EXAM  HIP: right HIP  The affected hip is compared to the contralateral hip.    Observation:    There is no edema, erythema, or ecchymosis in the lumbosacral region.   There is no Trendelenburg sign on either side  + " obvious pelvic obliquity while standing - improved with R 5 mm heel lift  No thoracolumbar scoliosis observed.    No midline skin abnormalities.  No atrophy noted in the lower limbs.  Gait: Right antalgic with Neutral ankle mechanics and Neutral medial arch    ROM (* = with pain):  Passive hip flexion to 120° on left and 120° on right  Passive hip internal rotation to 45° on left and 45° on right*  Passive hip external rotation to 45° on left and 45° on right   Passive hip abduction to 45° on left and 45° on right  All motions above are without pain.    Tenderness To Palpation:  No tenderness at the ASIS, AIIS, PSIS, PIIS, iliac crest, pubic bones, ischial tuberosity.  No tenderness throughout the lumbar spine, iliolumbar region, or posterior pelvis.  No tenderness throughout the sacrum or piriformis  No tenderness over the greater trochanteric bursa or greater/lesser trochanters.  + tenderness at the glut attachments on the greater trochanter  No tenderness over proximal IT band or hip flexor musculature.    Strength Testing (* = with pain):  Hip flexion - 5/5 on left and 5/5 on right  Hip extension - 5/5 on left and 5/5 on right  Hip adduction - 5/5 on left and 5/5 on right  Hip abduction - 5/5 on left and 5/5 on right  Knee flexion - 5/5 on left and 5/5 on right  Knee extension - 5/5 on left and 5/5 on right  Glutaeus medius - 5/5 on left and 5/5 on right with decreased compensatory lumbar rotation    Special Tests:  Standing Trendelenburg test - negative    Seated straight leg raise - negative  Supine straight leg raise - negative  Hamstring flexibility symmetric    Log roll - negative  DEIRDRE - negative  FADIR - negative  Scour test - negative  No pain with posterior hip capsule compression    ASIS compression test - negative  SI drawer test - negative   Thigh thrust test - negative     Piriformis test (Bonnet's) - negative  Ely's test - negative  Quadriceps flexibility symmetric.  Vikash test - negative  Mateo  compression test - negative    Fulcrum test - negative    + right short leg - corrected with R 5 mm heel lift    Neurovascular Exam:  Normal gait without Trendelenburg or antalgia.  2+ femoral, DP, and PT pulses BL.  No skin changes, no abnormal hair distribution.  Sensation intact to light touch throughout the obturator and medial/lateral/posterior femoral cutaneous nerves.  2+/4 reflexes at L4 and S1 dermatomes  Capillary refill intact to <2 seconds in all lower limb digits.    TART (Tissue texture abnormality, Asymmetry,  Restriction of motion and/or Tenderness) changes:       Thoracic Spine   T1 Neutral   T2 Neutral   T3 Neutral   T4 Neutral   T5 Neutral   T6 Neutral   T7 Neutral   T8 Neutral   T9 Neutral   T10 Neutral   T11 NS-right,R-left   T12 NS-right,R-left     Rib cage: Neutral     Lumbar Spine   L1 NS-right,R-left   L2 NS-right,R-left   L3 Neutral   L4 FRS RIGHT   L5 FRS RIGHT     Pelvis:  Innominate:Neutral  Pubic bone:Neutral    Sacrum:Right unilateral    Lower extremity: Right lateral hip Herniated trigger point (HTP) fascial distortion     Key   F= Flexed   E = Extended   R = Rotated   S = Sidebent   TTA = tissue texture abnormality     Assessment:      Encounter Diagnoses   Name Primary?    Tear of right gluteus minimus tendon, subsequent encounter Yes    Lateral pain of right hip     Acquired short leg syndrome on right     Myalgia     Somatic dysfunction of thoracic region     Somatic dysfunction of lumbar region     Sacral region somatic dysfunction     Somatic dysfunction of lower extremity             Plan:   1. Right lateral hip pain secondary to gluteal tendinosis with associated right gluteus minimus partial tearing, as noted on recent right hip MRI- improved with good strength progression from formal PT. Underlying  poor pelvic/core stability with compensatory muscle firing patterns and right short-leg also likely contributing to increase lateral hip strain and biomechanical restrictions of  the lower kinetic chain- also improved  - recommend continuing Celebrex 200 mg p.o. once twice a day as needed for pain control  - recomend Ice up to 20 minutes at a time prn for pain control  - recommend continuing formal physical therapy ([facility]) as planned. Emphasized importance of maintaining HEP after completion of PT.   - OMT performed again today to address biomechanical contributions to pain  - HEP started  - recommend slow progression of right 5 mm heel lift wear for underlying right short-leg.  Shoes adjusted today, removing factory insert in shoes and putting heel lift under over-the-counter arch support inserts.   - discussed option of possible future ultrasound-guided PRP injection for gluteus minimus partial tearing if progress plateaus, the patient would like to hold off on this, due to psoriatic arthritis flare requiring daily Celebrex use  -  X-ray images of right hip taken 3/21/23 (AP pelvis and frogleg lateral  right views) showed no abnormalities. Images were personally reviewed with patient.  - MRI images of right hip taken 8/19/23 showed No evidence for AVN of the hip or occult fracture. Partial-thickness tear RIGHT gluteus minimus insertion upon the anterior facet greater trochanter without retraction.  Associated adjacent bone marrow edema.     2. OMT 3-4 regions. Oral consent obtained.  Reviewed benefits and potential side effects, including bruising at site of treatment and increased soreness for the next 24-48 hours. Pt. Instructed to increased water intake by 1 L today and tomorrow to help with any residual soreness.   - OMT indicated today due to signs and symptoms as well as local and remote somatic dysfunction findings and their related neurokinetic, lymphatic, fascial and/or arteriovenous body connections.   - OMT techniques used: Myofascial Release, Muscle Energy, and Fascial Distortion Model   - Treatment was tolerated well. Improvement noted in segmental mobility  post-treatment in dysfunctional regions. There were no adverse events and no complications immediately following treatment.     3. Reviewed with patient the following HEP:  Continue:  A)  Pelvic clock exercises given to do from the 6-12 o'clock positions:10-15 reps, twice daily. Hand out of exercise also given.   B) Clam shell exercises bilaterally: hold leg in abducted and externally rotated position for 5-10 seconds, repeat 5-10 times  C) Lower abdominal muscle isotonic exercises: Rotate pelvis into the 6 o'clock position and holding it to engage lower abdominal muscles. Then lift up leg, one at a time, alternating for 10-15 reps. Repeat exercises 1-2 times daily. Handout given.      The patient was taught a homegoing physical therapy regimen as described above. The patient demonstrated understanding of the exercises and proper technique of their execution.     4. Follow-up upon completion of PT if pain persist or deteriorates    5. Patient agreeable to today's plan and all questions were answered    This note is dictated using the M*Modal Fluency Direct word recognition program. There are word recognition mistakes that are occasionally missed on review.    Total time spent face-to face with patient counseling or coordinating care including prognosis, differential diagnosis, risks and benefits of treatment, instructions, compliance risk reductions as well as non-face-to-face time personally spent reviewing medial record, medical documentation, and coordination of care.     EST MINUTES X   [id number] 10-19    [id number] 20-29    [id number] 30-39 x   [id number] 40-54    NEW     01318 15-29    [id number] 30-44    [id number] 45-59    [id number] 60-74    PHONE      5-10    [id number] 11-20    [id number] 21-30

## 2023-10-03 ENCOUNTER — CLINICAL SUPPORT (OUTPATIENT)
Dept: REHABILITATION | Facility: HOSPITAL | Age: 43
End: 2023-10-03
Payer: COMMERCIAL

## 2023-10-03 DIAGNOSIS — M25.551 BILATERAL HIP PAIN: Primary | ICD-10-CM

## 2023-10-03 DIAGNOSIS — M25.552 BILATERAL HIP PAIN: Primary | ICD-10-CM

## 2023-10-03 PROCEDURE — 97112 NEUROMUSCULAR REEDUCATION: CPT | Mod: PN

## 2023-10-06 NOTE — PROGRESS NOTES
"   [facility]   Physical Therapy Treatment Note      Name: Dede Silver  Clinic Number: [id number]    Therapy Diagnosis:   Encounter Diagnosis   Name Primary?    Bilateral hip pain Yes     Physician: Tim Sawant, *    Visit Date: 10/3/2023     Physician Orders: PT Eval and Treat  Medical Diagnosis from Referral: L40.50 (ICD-10-CM) - Psoriatic arthritis: Evaluate and treat anterior thigh pain / quadriceps tendinitis  Evaluation Date: 5/3/2023  Authorization Period Expiration: 12/31/2023  Plan of Care Expiration: 5/3/2023 to 7/31/2023 - update POC next session*  Visit # / Visits authorized: 17/20 (+Evaluation)     PTA Visit: 0/5    FOTO: 5/5 NEXT SESSION     Time In: 3:00 PM  Time Out: 3:45 PM  Total Billable Time: 45 minutes     Precautions: Psoriatic arthritis       Subjective     Pt reports: Really feels as though the isometrics and core strengthening exercises are helpful. She does feel some anterior hip pain when approaching number 17 of her dead bugs or SLR's.     She  was somewhat  compliant with home exercise program.  Response to previous treatment: a little sore  Functional change: NA    Pain: 3/10  Location: bilateral hips      Objective      Obtain next session**    Treatment     Dede received the treatments listed below:      Dede received therapeutic exercises to develop strength, endurance, and flexibility for 5 minutes including:  Pelvic Tilts: 30x, 5" holds  Patient education    neuromuscular re-education activities to improve: Coordination, Proprioception, and Posture for 40 minutes. The following activities were included:  TA Contractions: 3x10, 5" holds  TA Contractions with marches: 3x10, alternating marches  Dead Bugs (arms only): 4x5, alternating arms  Dead Bugs (legs only): 4x5, alternating arms  Hip Abductor Isometrics: 45"x5 with the following exercises performed during 2 minute rest breaks:   Hip adduction ball squeezes   DL Gluteal bridges with " adduction ball squeeze      Home Exercises and Patient Education Provided     Education provided:   - Importance and role of physical therapy  - Proper body mechanics when performing HEP     Written Home Exercises Provided: yes.  Exercises were reviewed and Dede was able to demonstrate them prior to the end of the session.  Dede demonstrated good  understanding of the education provided.      See EMR under Patient Instructions for exercises provided 5/3/2023.     Assessment     Session limited due to patient fatiguing and requesting to end session early. Continued with focus on core strengthening as well as hip abductor isometrics. Previously was unable to tolerate clamshells secondary to pain, but is now able to tolerate supine clamshells, while sidelying clamshells are still painful on the R side; able to perform on the L. Patient needs constant cueing for proper core engagement during exercises and fatigues easily with deadbugs. Overall she is progressing with pain and function, but slowly. Will reassess ROM and strength next session and update POC.     Pt prognosis is Fair.   Pt will benefit from skilled outpatient Physical Therapy to address the deficits stated above and in the chart below, provide pt/family education, and to maximize pt's level of independence.      Plan of care discussed with patient: Yes  Pt's spiritual, cultural and educational needs considered and pt agreeable to plan of care and goals as stated below:      Anticipated Barriers for therapy: COVID-19        Goals:   Short Term Goals (6 weeks):  1. Patient to be independent with initial HEP to supplement therapy sessions. Progressing, Not Met  2. Patient to improve impaired mm groups to 75% LSI to improve strength for functional mobility. Progressing, Not Met  3. Patient to report decreased pain at worst to less than or equal to 3/10. Progressing, Not Met     Long Term Goals (12 weeks):   1. Patient to be independent with updated HEP to  "supplement therapy sessions. Progressing, Not Met  2. Patient to improve impaired mm groups to greater than or equal to 95% LSI to improve strength for functional mobility.Progressing, Not Met  3. Patient to report no pain an B hips. Progressing, Not Met  4. Patient to return to PLOF with no pain. Progressing, Not Met       Plan     Continue to improve damien LE strength and hip mobility.     Not performed today:  Supine SLR's: 2x5 B  DL Gluteal Bridges: 2x6, 5" holds  Supine Clamshells: 3x6, GTB   LAQ's: 2x7, BlueTB, B  1/2 kneeling hip flexor stretch: 15"x3    Lauren Amaya, PT       "

## 2023-10-10 ENCOUNTER — CLINICAL SUPPORT (OUTPATIENT)
Dept: REHABILITATION | Facility: HOSPITAL | Age: 43
End: 2023-10-10
Payer: COMMERCIAL

## 2023-10-10 DIAGNOSIS — M25.551 BILATERAL HIP PAIN: Primary | ICD-10-CM

## 2023-10-10 DIAGNOSIS — M25.552 BILATERAL HIP PAIN: Primary | ICD-10-CM

## 2023-10-10 PROCEDURE — 97112 NEUROMUSCULAR REEDUCATION: CPT | Mod: PN

## 2023-10-10 NOTE — PROGRESS NOTES
"   [facility]   Physical Therapy Treatment Note      Name: Dede Silver  Clinic Number: [id number]    Therapy Diagnosis:   Encounter Diagnosis   Name Primary?    Bilateral hip pain Yes     Physician: Tim Sawant, *    Visit Date: 10/10/2023     Physician Orders: PT Eval and Treat  Medical Diagnosis from Referral: L40.50 (ICD-10-CM) - Psoriatic arthritis: Evaluate and treat anterior thigh pain / quadriceps tendinitis  Evaluation Date: 5/3/2023  Authorization Period Expiration: 12/31/2023  Plan of Care Expiration: 5/3/2023 to 7/31/2023 - update POC next session*  Visit # / Visits authorized: 18/20 (+Evaluation)     PTA Visit: 0/5    FOTO: 5/5 NEXT SESSION     Time In: 3:10 PM  Time Out: 3:50 PM  Total Billable Time: 40 minutes     Precautions: Psoriatic arthritis       Subjective     Pt reports: She has been experiencing increased aching in her right anterior hip since Sunday. Not sure what caused the increased pain, but was able to do some of her exercises. Forgot to do the hip isometrics to assist.     She  was somewhat  compliant with home exercise program.  Response to previous treatment: a little sore  Functional change: NA    Pain: 3/10  Location: bilateral hips      Objective      Obtain next session**    Treatment     Dede received the treatments listed below:      Dede received therapeutic exercises to develop strength, endurance, and flexibility for 5 minutes including:  Pelvic Tilts: 30x, 5" holds  Patient education    neuromuscular re-education activities to improve: Coordination, Proprioception, and Posture for 35 minutes. The following activities were included:  TA Contractions: 3x10, 5" holds  TA Contractions with marches: 3x10, alternating marches  Dead Bugs (arms only): 4x5, alternating arms  Dead Bugs (legs only): 4x5, alternating arms  Hip Abductor Isometrics: 45"x5 with the following exercises performed during 2 minute rest breaks:   Hip adduction ball " squeezes   DL Gluteal bridges with adduction ball squeeze  +Sit to Stands: 3x10, high/low mat      Home Exercises and Patient Education Provided     Education provided:   - Importance and role of physical therapy  - Proper body mechanics when performing HEP     Written Home Exercises Provided: yes.  Exercises were reviewed and Dede was able to demonstrate them prior to the end of the session.  Dede demonstrated good  understanding of the education provided.      See EMR under Patient Instructions for exercises provided 5/3/2023.     Assessment     Session limited due to patient fatiguing and requesting to end session early due to fatigue. Continued with focus on core strengthening as well as hip abductor isometrics. Patient initially presented with increased anterior hip pain that was improved following performance of isometrics. Educated to perform isometrics daily to assist with decreasing aching. Continued with clamshells in supine position as sidelying is painful. Improved performance of core exercises this session with less cueing required for proper core engagement. Overall she is progressing with pain and function, but slowly. Will reassess ROM and strength next session and update POC.     Pt prognosis is Fair.   Pt will benefit from skilled outpatient Physical Therapy to address the deficits stated above and in the chart below, provide pt/family education, and to maximize pt's level of independence.      Plan of care discussed with patient: Yes  Pt's spiritual, cultural and educational needs considered and pt agreeable to plan of care and goals as stated below:      Anticipated Barriers for therapy: COVID-19        Goals:   Short Term Goals (6 weeks):  1. Patient to be independent with initial HEP to supplement therapy sessions. Progressing, Not Met  2. Patient to improve impaired mm groups to 75% LSI to improve strength for functional mobility. Progressing, Not Met  3. Patient to report decreased pain  "at worst to less than or equal to 3/10. Progressing, Not Met     Long Term Goals (12 weeks):   1. Patient to be independent with updated HEP to supplement therapy sessions. Progressing, Not Met  2. Patient to improve impaired mm groups to greater than or equal to 95% LSI to improve strength for functional mobility.Progressing, Not Met  3. Patient to report no pain an B hips. Progressing, Not Met  4. Patient to return to PLOF with no pain. Progressing, Not Met       Plan     Continue to improve damien LE strength and hip mobility.     Not performed today:  Supine SLR's: 2x5 B  DL Gluteal Bridges: 2x6, 5" holds  Supine Clamshells: 3x6, GTB   LAQ's: 2x7, BlueTB, B  1/2 kneeling hip flexor stretch: 15"x3    Lauren Amaya, PT       "

## 2023-10-16 ENCOUNTER — PATIENT MESSAGE (OUTPATIENT)
Dept: RHEUMATOLOGY | Facility: CLINIC | Age: 43
End: 2023-10-16
Payer: COMMERCIAL

## 2023-10-17 ENCOUNTER — CLINICAL SUPPORT (OUTPATIENT)
Dept: REHABILITATION | Facility: HOSPITAL | Age: 43
End: 2023-10-17
Payer: COMMERCIAL

## 2023-10-17 DIAGNOSIS — M25.551 BILATERAL HIP PAIN: Primary | ICD-10-CM

## 2023-10-17 DIAGNOSIS — M25.552 BILATERAL HIP PAIN: Primary | ICD-10-CM

## 2023-10-17 PROCEDURE — 97112 NEUROMUSCULAR REEDUCATION: CPT | Mod: PN

## 2023-10-17 NOTE — PROGRESS NOTES
[facility]   Physical Therapy Treatment Note      Name: Dede Silver  Clinic Number: [id number]    Therapy Diagnosis:   Encounter Diagnosis   Name Primary?    Bilateral hip pain Yes     Physician: Tim Sawant, *    Visit Date: 10/17/2023     Physician Orders: PT Eval and Treat  Medical Diagnosis from Referral: L40.50 (ICD-10-CM) - Psoriatic arthritis: Evaluate and treat anterior thigh pain / quadriceps tendinitis  Evaluation Date: 5/3/2023  Authorization Period Expiration: 12/31/2023  Plan of Care Expiration: 5/3/2023 to see updated POC  Visit # / Visits authorized: 20/40 (+Evaluation)     PTA Visit: 0/5    FOTO: 5/5 NEXT SESSION     Time In: 2:05 PM  Time Out: 3:00 PM  Total Billable Time: 55 minutes     Precautions: Psoriatic arthritis       Subjective     Pt reports: She had some severe pain on Sunday but it has since improved. Having some upper back pain on the right side that comes and goes.     She  was somewhat  compliant with home exercise program.  Response to previous treatment: a little sore  Functional change: NA    Pain: 3/10  Location: bilateral hips      Objective      Updated on 10/17/2023:     All below testing performed in seated position. Gait belt used for quadriceps testing.     Lower extremity strength with [employer] handheld dynamometer Right  (Kgf) Left  (Kgf) Pain/dysfunction with movement   (approx 4 sec hold w/ max contraction)   Hip flexion 5.3 4.9     Hip abduction 5.4 4.8     Hip IR 6.8 4.1     Hip ER 5.6 5.0     Hip Ext 4.5 4.3     Quadriceps 12.9 11.1     Hamstrings 9.8 9.8          CMS Impairment/Limitation/Restriction for FOTO Hip Survey     Therapist reviewed FOTO scores for Dede Silver on 5/3/2023.   FOTO documents entered into [facility]` - see Media section.     Initial Limitation Score: 64%  10/17/2023 Limitation Score: 57%  Predicted Limitation Score: 40%  Category: Mobility           Treatment     Dede received the treatments listed below:   "    Dede received therapeutic exercises to develop strength, endurance, and flexibility for 5 minutes including:  Pelvic Tilts: 30x, 5" holds  Patient education    neuromuscular re-education activities to improve: Coordination, Proprioception, and Posture for 50 minutes. The following activities were included:  Hip Abductor Isometrics: 45"x5 with the following exercises performed during 2 minute rest breaks:   Hip adduction ball squeezes   DL Gluteal bridges with adduction ball squeeze  Objective testing (see above)  Sit to Stands: 3x10, high/low mat  +Forward Step-ups: 3x10, 4" step      Not performed today/OOT:   TA Contractions: 3x10, 5" holds  TA Contractions with marches: 3x10, alternating marches  Dead Bugs (arms only): 4x5, alternating arms  Dead Bugs (legs only): 4x5, alternating arms      Home Exercises and Patient Education Provided     Education provided:   - Importance and role of physical therapy  - Proper body mechanics when performing HEP     Written Home Exercises Provided: yes.  Exercises were reviewed and Dede was able to demonstrate them prior to the end of the session.  Dede demonstrated good  understanding of the education provided.      See EMR under Patient Instructions for exercises provided 5/3/2023.     Assessment     See updated POC.     Pt prognosis is Fair.   Pt will benefit from skilled outpatient Physical Therapy to address the deficits stated above and in the chart below, provide pt/family education, and to maximize pt's level of independence.      Plan of care discussed with patient: Yes  Pt's spiritual, cultural and educational needs considered and pt agreeable to plan of care and goals as stated below:      Anticipated Barriers for therapy: COVID-19        Goals:   Short Term Goals (6 weeks):  1. Patient to be independent with initial HEP to supplement therapy sessions. Progressing, Not Met  2. Patient to improve impaired mm groups to 75% LSI to improve strength for " "functional mobility. Progressing, Not Met  3. Patient to report decreased pain at worst to less than or equal to 3/10. Progressing, Not Met     Long Term Goals (12 weeks):   1. Patient to be independent with updated HEP to supplement therapy sessions. Progressing, Not Met  2. Patient to improve impaired mm groups to greater than or equal to 95% LSI to improve strength for functional mobility.Progressing, Not Met  3. Patient to report no pain an B hips. Progressing, Not Met  4. Patient to return to PLOF with no pain. Progressing, Not Met       Plan     Continue to improve damien LE strength and hip mobility.     Not performed today:  Supine SLR's: 2x5 B  DL Gluteal Bridges: 2x6, 5" holds  Supine Clamshells: 3x6, GTB   LAQ's: 2x7, BlueTB, B  1/2 kneeling hip flexor stretch: 15"x3    Lauren Amaya, PT       "

## 2023-10-24 ENCOUNTER — CLINICAL SUPPORT (OUTPATIENT)
Dept: REHABILITATION | Facility: HOSPITAL | Age: 43
End: 2023-10-24
Payer: COMMERCIAL

## 2023-10-24 DIAGNOSIS — M25.551 BILATERAL HIP PAIN: Primary | ICD-10-CM

## 2023-10-24 DIAGNOSIS — M25.552 BILATERAL HIP PAIN: Primary | ICD-10-CM

## 2023-10-24 PROCEDURE — 97110 THERAPEUTIC EXERCISES: CPT | Mod: PN

## 2023-10-24 PROCEDURE — 97112 NEUROMUSCULAR REEDUCATION: CPT | Mod: PN

## 2023-10-24 NOTE — PROGRESS NOTES
"   [facility]   Physical Therapy Treatment Note      Name: Dede Silver  Clinic Number: [id number]    Therapy Diagnosis:   Encounter Diagnosis   Name Primary?    Bilateral hip pain Yes     Physician: Tim Sawant, *    Visit Date: 10/24/2023     Physician Orders: PT Eval and Treat  Medical Diagnosis from Referral: L40.50 (ICD-10-CM) - Psoriatic arthritis: Evaluate and treat anterior thigh pain / quadriceps tendinitis  Evaluation Date: 5/3/2023  Authorization Period Expiration: 12/31/2023  Plan of Care Expiration: 5/3/2023 to 12/31/2023  Visit # / Visits authorized: 20/40 (+Evaluation)     PTA Visit: 0/5    FOTO: DONE     Time In: 2:05 PM  Time Out: 3:00 PM  Total Billable Time: 55 minutes     Precautions: Psoriatic arthritis       Subjective     Pt reports: Her hip has actually been feeling a lot better. She has, however, been experiencing increased low back pain that she used to feel months before her hips started hurting. States the back pain used to be common secondary to her psoriatic arthritis.     She  was somewhat  compliant with home exercise program.  Response to previous treatment: a little sore  Functional change: NA    Pain: 3/10  Location: bilateral hips      Objective      Measurements last performed on 10/17/2023.     Treatment     Dede received the treatments listed below:      Dede received therapeutic exercises to develop strength, endurance, core strength, and flexibility for 25 minutes including:  Pelvic Tilts: 30x, 5" holds  Dead Bugs (arms only): 4x5, alternating arms  Supine Clamshells: 3x10, RTB  Ball 3-way: 5x5" holds, each direction  Patient education      neuromuscular re-education activities to improve: Coordination, Proprioception, and Posture for 30 minutes. The following activities were included:  Hip Abductor Isometrics: 45"x5 with the following exercises performed during 2 minute rest breaks:   Hip adduction ball squeezes   DL Gluteal bridges " "with adduction ball squeeze  Objective testing (see above)  Sit to Stands: 3x10, high/low mat, 10# kettlebell  Forward Step-ups: 2x10, 6" step        Home Exercises and Patient Education Provided     Education provided:   - Importance and role of physical therapy  - Proper body mechanics when performing HEP     Written Home Exercises Provided: yes.  Exercises were reviewed and Dede was able to demonstrate them prior to the end of the session.  Dede demonstrated good  understanding of the education provided.      See EMR under Patient Instructions for exercises provided 5/3/2023.     Assessment     Dede presented to therapy with reports of improvements in pain and function with R hip, but reports increased low back pain bilaterally. Continues to benefit from hip abductor isometrics, therefore began session with isometrics with improved tolerance. Improved endurance noted as she is able to perform exercises for the full 2 minute rest breaks between isometrics. Continues with difficulty with proper core engagement when performing dead bugs. Also performance of deadbugs is limited to arms only as including legs causes increased pain in R hip. Able to tolerate sit to stands and step-ups this session with less fatigue. Overall progressing, but slowly. Continues to benefit from core strength and isometrics.     Pt prognosis is Fair.   Pt will benefit from skilled outpatient Physical Therapy to address the deficits stated above and in the chart below, provide pt/family education, and to maximize pt's level of independence.      Plan of care discussed with patient: Yes  Pt's spiritual, cultural and educational needs considered and pt agreeable to plan of care and goals as stated below:      Anticipated Barriers for therapy: COVID-19        Goals:   Short Term Goals (6 weeks):  1. Patient to be independent with initial HEP to supplement therapy sessions. Progressing, Not Met  2. Patient to improve impaired mm groups to " "75% LSI to improve strength for functional mobility. Progressing, Not Met  3. Patient to report decreased pain at worst to less than or equal to 3/10. Progressing, Not Met     Long Term Goals (12 weeks):   1. Patient to be independent with updated HEP to supplement therapy sessions. Progressing, Not Met  2. Patient to improve impaired mm groups to greater than or equal to 95% LSI to improve strength for functional mobility.Progressing, Not Met  3. Patient to report no pain an B hips. Progressing, Not Met  4. Patient to return to PLOF with no pain. Progressing, Not Met       Plan     Continue to improve damien LE strength and hip mobility.     Not performed today:  Supine SLR's: 2x5 B  DL Gluteal Bridges: 2x6, 5" holds  Supine Clamshells: 3x6, GTB   LAQ's: 2x7, BlueTB, B  1/2 kneeling hip flexor stretch: 15"x3    Not performed today/OOT:   TA Contractions: 3x10, 5" holds  TA Contractions with marches: 3x10, alternating marches  Dead Bugs (legs only): 4x5, alternating arms    Lauren Amaya, PT       "

## 2023-10-24 NOTE — PLAN OF CARE
[facility]  Physical Therapy Plan of Care Note     Name: Dede Silver  Clinic Number: [id number]    Therapy Diagnosis:   Encounter Diagnosis   Name Primary?    Bilateral hip pain Yes     Physician: Tim Sawant, *    Visit Date: 10/17/2023    Physician Orders: PT Eval and Treat  Medical Diagnosis from Referral: L40.50 (ICD-10-CM) - Psoriatic arthritis: Evaluate and treat anterior thigh pain / quadriceps tendinitis  Evaluation Date: 5/3/2023  Authorization Period Expiration: 12/31/2023  Plan of Care Expiration: 12/31/2023  Progress Note Due: 11/30/2023   Visit # / Visits authorized: 20/40 (+Evaluation)  FOTO: performed this session    Precautions: Psoriatic arthritis   Functional Level Prior to Evaluation:  Intermittent pain and stiffness from psoriatic arthritis     Subjective     Update: She had some severe pain on Sunday but it has since improved. Having some upper back pain on the right side that comes and goes.        Objective      Update:     Updated on 10/17/2023:      All below testing performed in seated position. Gait belt used for quadriceps testing.     Lower extremity strength with [employer] handheld dynamometer Right  (Kgf) Left  (Kgf) Pain/dysfunction with movement   (approx 4 sec hold w/ max contraction)   Hip flexion 5.3 4.9     Hip abduction 5.4 4.8     Hip IR 6.8 4.1     Hip ER 5.6 5.0     Hip Ext 4.5 4.3     Quadriceps 12.9 11.1     Hamstrings 9.8 9.8           CMS Impairment/Limitation/Restriction for FOTO Hip Survey     Therapist reviewed FOTO scores for Dede Silver on 5/3/2023.   FOTO documents entered into [employer] - see Media section.     Initial Limitation Score: 64%  10/17/2023 Limitation Score: 57%  Predicted Limitation Score: 40%  Category: Mobility           Assessment     Update: Dede presented to therapy with an overall improvement in function since beginning therapy, however continues to have significant limitations with prolonged standing, walking,  and stair navigation. Reassessed strength with HHD this session with patient displaying improvements in all mm groups, some large changes and some minimal changes, however improvements and no pain elicited during testing. Also performed an updated FOTO metric with patient decreasing limitation score by 7% since initial evaluation. Overall patient has benefited from change in treatment to focus on isometrics, core strengthening, and light hip flexor and hip abductor strengthening. Neither short term nor long term goals were met, at this time, however patient is progressing towards them. Will continue to progress as tolerated.     Previous Short Term Goals Status:     1. Patient to be independent with initial HEP to supplement therapy sessions. Progressing, Not Met  2. Patient to improve impaired mm groups to 75% LSI to improve strength for functional mobility. Progressing, Not Met  3. Patient to report decreased pain at worst to less than or equal to 3/10. Progressing, Not Met    New Short Term Goals Status:   Continue per initial POC    Previous Long Term Goals Status:     1. Patient to be independent with updated HEP to supplement therapy sessions. Progressing, Not Met  2. Patient to improve impaired mm groups to greater than or equal to 95% LSI to improve strength for functional mobility.Progressing, Not Met  3. Patient to report no pain an B hips. Progressing, Not Met  4. Patient to return to PLOF with no pain. Progressing, Not Met    Long Term Goal Status: continue per initial plan of care.    Reasons for Recertification of Therapy:   Expiration of current POC.       Plan     Updated Certification Period: 7/31/2023 to 12/31/2023   Recommended Treatment Plan: 2 times per week for 12 weeks:  Electrical Stimulation , FDN prn, Manual Therapy, Moist Heat/ Ice, Neuromuscular Re-ed, Patient Education, Therapeutic Activities, Therapeutic Exercise, and Kinesiotape prn  Other Recommendations: None    Lauren Amaya  PT

## 2023-10-25 ENCOUNTER — LAB VISIT (OUTPATIENT)
Dept: LAB | Facility: HOSPITAL | Age: 43
End: 2023-10-25
Attending: INTERNAL MEDICINE
Payer: COMMERCIAL

## 2023-10-25 DIAGNOSIS — Z79.899 HIGH RISK MEDICATION USE: ICD-10-CM

## 2023-10-25 DIAGNOSIS — L40.50 PSORIATIC ARTHRITIS: Chronic | ICD-10-CM

## 2023-10-25 LAB
ALBUMIN SERPL BCP-MCNC: 3.8 G/DL (ref 3.5–5.2)
ALP SERPL-CCNC: 60 U/L (ref 55–135)
ALT SERPL W/O P-5'-P-CCNC: 11 U/L (ref 10–44)
ANION GAP SERPL CALC-SCNC: 8 MMOL/L (ref 8–16)
AST SERPL-CCNC: 9 U/L (ref 10–40)
BASOPHILS # BLD AUTO: 0.03 K/UL (ref 0–0.2)
BASOPHILS NFR BLD: 0.6 % (ref 0–1.9)
BILIRUB SERPL-MCNC: 0.3 MG/DL (ref 0.1–1)
BUN SERPL-MCNC: 13 MG/DL (ref 6–20)
CALCIUM SERPL-MCNC: 9.2 MG/DL (ref 8.7–10.5)
CHLORIDE SERPL-SCNC: 105 MMOL/L (ref 95–110)
CO2 SERPL-SCNC: 24 MMOL/L (ref 23–29)
CREAT SERPL-MCNC: 0.8 MG/DL (ref 0.5–1.4)
CRP SERPL-MCNC: 3.5 MG/L (ref 0–8.2)
DIFFERENTIAL METHOD: ABNORMAL
EOSINOPHIL # BLD AUTO: 0.1 K/UL (ref 0–0.5)
EOSINOPHIL NFR BLD: 1.4 % (ref 0–8)
ERYTHROCYTE [DISTWIDTH] IN BLOOD BY AUTOMATED COUNT: 14.6 % (ref 11.5–14.5)
ERYTHROCYTE [SEDIMENTATION RATE] IN BLOOD BY WESTERGREN METHOD: 19 MM/HR (ref 0–20)
EST. GFR  (NO RACE VARIABLE): >60 ML/MIN/1.73 M^2
GLUCOSE SERPL-MCNC: 104 MG/DL (ref 70–110)
HCT VFR BLD AUTO: 32.6 % (ref 37–48.5)
HGB BLD-MCNC: 10.3 G/DL (ref 12–16)
IMM GRANULOCYTES # BLD AUTO: 0.02 K/UL (ref 0–0.04)
IMM GRANULOCYTES NFR BLD AUTO: 0.4 % (ref 0–0.5)
LYMPHOCYTES # BLD AUTO: 1.7 K/UL (ref 1–4.8)
LYMPHOCYTES NFR BLD: 33.9 % (ref 18–48)
MCH RBC QN AUTO: 28.4 PG (ref 27–31)
MCHC RBC AUTO-ENTMCNC: 31.6 G/DL (ref 32–36)
MCV RBC AUTO: 90 FL (ref 82–98)
MONOCYTES # BLD AUTO: 0.3 K/UL (ref 0.3–1)
MONOCYTES NFR BLD: 6.7 % (ref 4–15)
NEUTROPHILS # BLD AUTO: 2.9 K/UL (ref 1.8–7.7)
NEUTROPHILS NFR BLD: 57 % (ref 38–73)
NRBC BLD-RTO: 0 /100 WBC
PLATELET # BLD AUTO: 244 K/UL (ref 150–450)
PMV BLD AUTO: 9.5 FL (ref 9.2–12.9)
POTASSIUM SERPL-SCNC: 4.2 MMOL/L (ref 3.5–5.1)
PROT SERPL-MCNC: 7.1 G/DL (ref 6–8.4)
RBC # BLD AUTO: 3.63 M/UL (ref 4–5.4)
SODIUM SERPL-SCNC: 137 MMOL/L (ref 136–145)
WBC # BLD AUTO: 5.05 K/UL (ref 3.9–12.7)

## 2023-10-25 PROCEDURE — 86140 C-REACTIVE PROTEIN: CPT | Performed by: INTERNAL MEDICINE

## 2023-10-25 PROCEDURE — 36415 COLL VENOUS BLD VENIPUNCTURE: CPT | Performed by: INTERNAL MEDICINE

## 2023-10-25 PROCEDURE — 80053 COMPREHEN METABOLIC PANEL: CPT | Performed by: INTERNAL MEDICINE

## 2023-10-25 PROCEDURE — 85025 COMPLETE CBC W/AUTO DIFF WBC: CPT | Performed by: INTERNAL MEDICINE

## 2023-10-25 PROCEDURE — 85652 RBC SED RATE AUTOMATED: CPT | Performed by: INTERNAL MEDICINE

## 2023-10-31 ENCOUNTER — CLINICAL SUPPORT (OUTPATIENT)
Dept: REHABILITATION | Facility: HOSPITAL | Age: 43
End: 2023-10-31
Payer: COMMERCIAL

## 2023-10-31 DIAGNOSIS — M25.551 BILATERAL HIP PAIN: Primary | ICD-10-CM

## 2023-10-31 DIAGNOSIS — M25.552 BILATERAL HIP PAIN: Primary | ICD-10-CM

## 2023-10-31 PROCEDURE — 97110 THERAPEUTIC EXERCISES: CPT | Mod: PN

## 2023-10-31 PROCEDURE — 97112 NEUROMUSCULAR REEDUCATION: CPT | Mod: PN

## 2023-10-31 NOTE — PROGRESS NOTES
"   [facility]   Physical Therapy Treatment Note      Name: Dede Silver  Clinic Number: [id number]    Therapy Diagnosis:   Encounter Diagnosis   Name Primary?    Bilateral hip pain Yes     Physician: Tim Sawant, *    Visit Date: 10/31/2023     Physician Orders: PT Eval and Treat  Medical Diagnosis from Referral: L40.50 (ICD-10-CM) - Psoriatic arthritis: Evaluate and treat anterior thigh pain / quadriceps tendinitis  Evaluation Date: 5/3/2023  Authorization Period Expiration: 12/31/2023  Plan of Care Expiration: 5/3/2023 to 12/31/2023  Visit # / Visits authorized: 21/40 (+Evaluation)     PTA Visit: 0/5    FOTO: DONE     Time In: 3:10 PM  Time Out: 3:55 PM  Total Billable Time: 45 minutes     Precautions: Psoriatic arthritis       Subjective     Pt reports: Her hip has been feeling really good. She's been able to perform the isometrics more often at home and feels as though they are helping.     She  was somewhat  compliant with home exercise program.  Response to previous treatment: a little sore  Functional change: NA    Pain: 3/10  Location: bilateral hips      Objective      Measurements last performed on 10/17/2023.     Treatment     Dede received the treatments listed below:      Dede received therapeutic exercises to develop strength, endurance, core strength, and flexibility for 30 minutes including:  Pelvic Tilts: 30x, 5" holds  Dead Bugs (arms only): 4x5, alternating arms  Supine Clamshells: 25x, RTB  +Standing Hip Abduction: 4x5 on L, 15x on R, YTB, B  Patient education      neuromuscular re-education activities to improve: Coordination, Proprioception, and Posture for 15 minutes. The following activities were included:  Sit to Stands: 3x10, high/low mat, 10# kettlebell  Forward Step-ups: 15x, 6" step, B  +Standing resisted marches: 3x5, YTB at balls of feet        Home Exercises and Patient Education Provided     Education provided:   - Importance and role of " physical therapy  - Proper body mechanics when performing HEP     Written Home Exercises Provided: yes.  Exercises were reviewed and Dede was able to demonstrate them prior to the end of the session.  Dede demonstrated good  understanding of the education provided.      See EMR under Patient Instructions for exercises provided 5/3/2023.     Assessment     Dede presented to therapy with reports of improvements in pain and function with R hip. Continues to benefit from abductor isometrics, however discharged to performance at home as patient has been diligent with performing independently. Significantly improved tolerance to arms only dead bugs and resistance with supine clamshells. Progressed to more CKC/standing strengthening however required increased rest breaks for step-ups and standing hip abduction. Overall progressing and getting stronger with better endurance than when initially starting therapy. Continues to benefit from core strength and isometrics.     Pt prognosis is Fair.   Pt will benefit from skilled outpatient Physical Therapy to address the deficits stated above and in the chart below, provide pt/family education, and to maximize pt's level of independence.      Plan of care discussed with patient: Yes  Pt's spiritual, cultural and educational needs considered and pt agreeable to plan of care and goals as stated below:      Anticipated Barriers for therapy: COVID-19        Goals:   Short Term Goals (6 weeks):  1. Patient to be independent with initial HEP to supplement therapy sessions. Progressing, Not Met  2. Patient to improve impaired mm groups to 75% LSI to improve strength for functional mobility. Progressing, Not Met  3. Patient to report decreased pain at worst to less than or equal to 3/10. Progressing, Not Met     Long Term Goals (12 weeks):   1. Patient to be independent with updated HEP to supplement therapy sessions. Progressing, Not Met  2. Patient to improve impaired mm groups to  "greater than or equal to 95% LSI to improve strength for functional mobility.Progressing, Not Met  3. Patient to report no pain an B hips. Progressing, Not Met  4. Patient to return to PLOF with no pain. Progressing, Not Met       Plan     Continue to improve damien LE strength and hip mobility.     Not performed today:  Supine SLR's: 2x5 B  DL Gluteal Bridges: 2x6, 5" holds  Supine Clamshells: 3x6, GTB   LAQ's: 2x7, BlueTB, B  1/2 kneeling hip flexor stretch: 15"x3    Not performed today/OOT:   TA Contractions: 3x10, 5" holds  TA Contractions with marches: 3x10, alternating marches  Dead Bugs (legs only): 4x5, alternating arms    Lauren Amaya, PT       "

## 2023-11-09 DIAGNOSIS — L40.9 PSORIASIS OF SCALP: ICD-10-CM

## 2023-11-09 DIAGNOSIS — L40.50 PSORIATIC ARTHRITIS: Chronic | ICD-10-CM

## 2023-11-09 RX ORDER — METHOTREXATE 2.5 MG/1
20 TABLET ORAL
Qty: 40 TABLET | Refills: 2 | Status: SHIPPED | OUTPATIENT
Start: 2023-11-09 | End: 2024-02-29

## 2023-11-30 ENCOUNTER — CLINICAL SUPPORT (OUTPATIENT)
Dept: REHABILITATION | Facility: HOSPITAL | Age: 43
End: 2023-11-30
Payer: COMMERCIAL

## 2023-11-30 DIAGNOSIS — M25.552 BILATERAL HIP PAIN: Primary | ICD-10-CM

## 2023-11-30 DIAGNOSIS — M25.551 BILATERAL HIP PAIN: Primary | ICD-10-CM

## 2023-11-30 PROCEDURE — 97110 THERAPEUTIC EXERCISES: CPT | Mod: PN,CQ

## 2023-11-30 PROCEDURE — 97112 NEUROMUSCULAR REEDUCATION: CPT | Mod: PN,CQ

## 2023-11-30 NOTE — PROGRESS NOTES
"   [facility]   Physical Therapy Treatment Note      Name: Dede Silver  Clinic Number: [id number]    Therapy Diagnosis:   Encounter Diagnosis   Name Primary?    Bilateral hip pain Yes       Physician: Tim Sawant, *    Visit Date: 11/30/2023     Physician Orders: PT Eval and Treat  Medical Diagnosis from Referral: L40.50 (ICD-10-CM) - Psoriatic arthritis: Evaluate and treat anterior thigh pain / quadriceps tendinitis  Evaluation Date: 5/3/2023  Authorization Period Expiration: 12/31/2023  Plan of Care Expiration: 5/3/2023 to 12/31/2023  Visit # / Visits authorized: 22/40 (+Evaluation)     PTA Visit: 1/5    FOTO: DONE     Time In: 3:01 PM  Time Out: 3:40 PM  Total Billable Time: 39 minutes (2TE, 1NMR)     Precautions: Psoriatic arthritis       Subjective     Pt reports: Her hip is doing much better than it had been in the last few months.  She feels weak from not being able to do as much as she was before her flare ups, but overall she's doing better.  She needs to leave between 3:30 and 3:40 to go  her kids from school.       She  was somewhat  compliant with home exercise program.  Response to previous treatment: a little sore  Functional change: NA    Pain: 3/10  Location: bilateral hips      Objective      Measurements last performed on 10/17/2023.     Treatment     Dede received the treatments listed below:      Dede received therapeutic exercises to develop strength, endurance, core strength, and flexibility for 30 minutes including:  Pelvic Tilts: 30x, 3" holds  Dead Bugs (arms only): 4x5, alternating arms  Supine Clamshells: 8x, RTB, 12 w/o resistance  Standing Hip Abduction: 4x5 on L, 15x on R, YTB, B  Patient education      neuromuscular re-education activities to improve: Coordination, Proprioception, and Posture for 9 minutes. The following activities were included:  Sit to Stands: 3x10, high/low mat, 10# kettlebell  Forward Step-ups: 15x, 6" step, " B  Standing resisted marches: 3x5, YTB at balls of feet        Home Exercises and Patient Education Provided     Education provided:   - Importance and role of physical therapy  - Proper body mechanics when performing HEP     Written Home Exercises Provided: yes.  Exercises were reviewed and Dede was able to demonstrate them prior to the end of the session.  Dede demonstrated good  understanding of the education provided.      See EMR under Patient Instructions for exercises provided 5/3/2023.     Assessment   Dede presented to PT with continued reports of decreased right hip pain and improved function.  Continued with previously performed exercises for improved right hip strength and ROM.  Dede displayed moderate fatigue with progressions to standing CKC exercises and continued to require occasional rest breaks.   Dede was unable to complete full 2 sets of clamshells with resistance secondary to reports of increased pain in groin.  Today's session was limited secondary to Dede requesting to leave early to  her kids from school.  However, she was able to complete the remainder of the session without reports of increased right hip pain or discomfort.          Pt prognosis is Fair.   Pt will benefit from skilled outpatient Physical Therapy to address the deficits stated above and in the chart below, provide pt/family education, and to maximize pt's level of independence.      Plan of care discussed with patient: Yes  Pt's spiritual, cultural and educational needs considered and pt agreeable to plan of care and goals as stated below:      Anticipated Barriers for therapy: COVID-19        Goals:   Short Term Goals (6 weeks):  1. Patient to be independent with initial HEP to supplement therapy sessions. Progressing, Not Met  2. Patient to improve impaired mm groups to 75% LSI to improve strength for functional mobility. Progressing, Not Met  3. Patient to report decreased pain at worst to less than or  "equal to 3/10. Progressing, Not Met     Long Term Goals (12 weeks):   1. Patient to be independent with updated HEP to supplement therapy sessions. Progressing, Not Met  2. Patient to improve impaired mm groups to greater than or equal to 95% LSI to improve strength for functional mobility.Progressing, Not Met  3. Patient to report no pain an B hips. Progressing, Not Met  4. Patient to return to PLOF with no pain. Progressing, Not Met       Plan     Continue to improve damien LE strength and hip mobility.     Not performed today:  Supine SLR's: 2x5 B  DL Gluteal Bridges: 2x6, 5" holds  Supine Clamshells: 3x6, GTB   LAQ's: 2x7, BlueTB, B  1/2 kneeling hip flexor stretch: 15"x3    Not performed today/OOT:   TA Contractions: 3x10, 5" holds  TA Contractions with marches: 3x10, alternating marches  Dead Bugs (legs only): 4x5, alternating arms    Amirah Ashwin, PTA       "

## 2023-12-10 DIAGNOSIS — L40.9 SCALP PSORIASIS: ICD-10-CM

## 2023-12-10 DIAGNOSIS — L40.50 PSORIATIC ARTHRITIS: Chronic | ICD-10-CM

## 2023-12-12 RX ORDER — CELECOXIB 200 MG/1
200 CAPSULE ORAL 2 TIMES DAILY PRN
Qty: 60 CAPSULE | Refills: 2 | Status: SHIPPED | OUTPATIENT
Start: 2023-12-12 | End: 2024-03-18 | Stop reason: SDUPTHER

## 2023-12-12 RX ORDER — CLOBETASOL PROPIONATE 0.46 MG/ML
SOLUTION TOPICAL 2 TIMES DAILY
Qty: 50 ML | Refills: 3 | Status: SHIPPED | OUTPATIENT
Start: 2023-12-12

## 2023-12-12 RX ORDER — SULFASALAZINE 500 MG/1
1500 TABLET ORAL 2 TIMES DAILY
Qty: 180 TABLET | Refills: 2 | Status: SHIPPED | OUTPATIENT
Start: 2023-12-12 | End: 2024-03-18 | Stop reason: SDUPTHER

## 2023-12-14 ENCOUNTER — TELEPHONE (OUTPATIENT)
Dept: ADMINISTRATIVE | Facility: HOSPITAL | Age: 43
End: 2023-12-14
Payer: COMMERCIAL

## 2023-12-20 ENCOUNTER — CLINICAL SUPPORT (OUTPATIENT)
Dept: REHABILITATION | Facility: HOSPITAL | Age: 43
End: 2023-12-20
Payer: COMMERCIAL

## 2023-12-20 DIAGNOSIS — M25.552 BILATERAL HIP PAIN: Primary | ICD-10-CM

## 2023-12-20 DIAGNOSIS — M25.551 BILATERAL HIP PAIN: Primary | ICD-10-CM

## 2023-12-20 PROCEDURE — 97110 THERAPEUTIC EXERCISES: CPT | Mod: PN

## 2023-12-20 NOTE — PROGRESS NOTES
"   [facility]   Physical Therapy Treatment Note      Name: Dede Silver  Clinic Number: [id number]    Therapy Diagnosis:   Encounter Diagnosis   Name Primary?    Bilateral hip pain Yes         Physician: Tim Sawant, *    Visit Date: 12/20/2023     Physician Orders: PT Eval and Treat  Medical Diagnosis from Referral: L40.50 (ICD-10-CM) - Psoriatic arthritis: Evaluate and treat anterior thigh pain / quadriceps tendinitis  Evaluation Date: 5/3/2023  Authorization Period Expiration: 12/31/2023  Plan of Care Expiration: 5/3/2023 to 1/30/24  Visit # / Visits authorized: 23/40 (+Evaluation)     PTA Visit: 0/5    FOTO: DONE     Time In: 0810 AM  Time Out: 0849 AM  Total Billable Time: 39 minutes (3TE, )     Precautions: Psoriatic arthritis       Subjective     Pt reports: She has been feeling good. Her most recent flare up with her psoriatic arthritis was on Sunday and it lasted for a day or so. She took her medicine and the pain was better but she has extreme fatigue after.       She  was somewhat  compliant with home exercise program.  Response to previous treatment: a little sore  Functional change: NA    Pain: 0/10  Location: bilateral hips      Objective      LSI performed 12/20/23    UPDATED Plan of Care 12/20/23  Treatment     Dede received the treatments listed below:      Dede received therapeutic exercises to develop strength, endurance, core strength, and flexibility for 39 minutes including:  REASSESSMENT above  Pelvic Tilts: 30x, 3" holds  Dead Bugs (arms only): 4x5, alternating arms  Supine Clamshells: 16x, RTB, 4 w/o resistance  Standing Hip Abduction: 4x5 on L, 15x on R, YTB, B  Patient education      neuromuscular re-education activities to improve: Coordination, Proprioception, and Posture for 00 minutes. The following activities were included:  Sit to Stands: 3x10, high/low mat, 10# kettlebell  Forward Step-ups: 15x, 6" step, B  Standing resisted marches: 3x5, YTB " "at balls of feet        Home Exercises and Patient Education Provided     Education provided:   - Importance and role of physical therapy  - Proper body mechanics when performing HEP     Written Home Exercises Provided: yes.  Exercises were reviewed and Dede was able to demonstrate them prior to the end of the session.  Dede demonstrated good  understanding of the education provided.      See EMR under Patient Instructions for exercises provided 5/3/2023.     Assessment   See updated Plan of Care         Pt prognosis is Fair.   Pt will benefit from skilled outpatient Physical Therapy to address the deficits stated above and in the chart below, provide pt/family education, and to maximize pt's level of independence.      Plan of care discussed with patient: Yes  Pt's spiritual, cultural and educational needs considered and pt agreeable to plan of care and goals as stated below:      Anticipated Barriers for therapy: COVID-19        Goals:   Short Term Goals (6 weeks):  1. Patient to be independent with initial HEP to supplement therapy sessions. Progressing, Not Met  2. Patient to improve impaired mm groups to 75% LSI to improve strength for functional mobility. Progressing, Not Met  3. Patient to report decreased pain at worst to less than or equal to 3/10. Progressing, Not Met     Long Term Goals (12 weeks):   1. Patient to be independent with updated HEP to supplement therapy sessions. Progressing, Not Met  2. Patient to improve impaired mm groups to greater than or equal to 95% LSI to improve strength for functional mobility.Progressing, Not Met  3. Patient to report no pain an B hips. Progressing, Not Met  4. Patient to return to PLOF with no pain. Progressing, Not Met       Plan     Continue to improve damien LE strength and hip mobility.     Not performed today:  Supine SLR's: 2x5 B  DL Gluteal Bridges: 2x6, 5" holds  Supine Clamshells: 3x6, GTB   LAQ's: 2x7, BlueTB, B  1/2 kneeling hip flexor stretch: " "15"x3    Not performed today/OOT:   TA Contractions: 3x10, 5" holds  TA Contractions with marches: 3x10, alternating marches  Dead Bugs (legs only): 4x5, alternating arms    Karo Artis, PT,DPT  12/20/2023        "

## 2023-12-20 NOTE — PLAN OF CARE
[facility]  Physical Therapy Plan of Care Note     Name: Dede Silver  Clinic Number: [id number]    Therapy Diagnosis:   Encounter Diagnosis   Name Primary?    Bilateral hip pain Yes     Physician: Tim Sawant, *    Visit Date: 2023    Physician Orders: PT Eval and Treat  Medical Diagnosis from Referral: L40.50 (ICD-10-CM) - Psoriatic arthritis: Evaluate and treat anterior thigh pain / quadriceps tendinitis  Evaluation Date: 5/3/23  Authorization Period Expiration: 23   Plan of Care Expiration: 23  Progress Note Due: 24   Visit # / Visits authorized:   FOTO: 3/3    Precautions: Psoriatic arthritis   Functional Level Prior to Evaluation:   Intermittent pain and stiffness from psoriatic arthritis      SUBJECTIVE     Update: She has been feeling good. Her most recent flare up with her psoriatic arthritis was on  and it lasted for a day or so. She took her medicine and the pain was better but she has extreme fatigue after.      OBJECTIVE     Update:  Gait belt used for quadriceps testing.     Lower extremity strength with [email or web address] handheld dynamometer Right  (Kgf) Left  (Kgf) Pain/dysfunction with movement   (approx 4 sec hold w/ max contraction)   Hip flexion 5.8  6.2  6.2        AV.1 6.8  6.9  6.1        6.6  92%   Hip abduction 7.2  6.5  7     AV.9 9.1  8.3  8.4     8.6  pain     80%   Hip IR 8.1  7.5  5.4     AV 8  5.9  6.6     6.8  102%   Hip ER 6.1  4.9  4.8     AV.3 7.6  7.2  6.7     7.2  74%   Hip Ext 4.1  3.9  2.7        AVG:3.6 6.1  4.5  4        4.9  73%   Quadriceps 14.8  14.4  16.7     AVG:15.3 12.6  12.4  12.7     12.6  121%   Hamstrings 7.7  10.2  9.4     AV.1 11.5  9.2  9.7     10.1  90%        ASSESSMENT     Update: Patient's strength was retested with HHD with improvements noted since last tested 2 months ago. Pain was elicited with Right hip abduction strength testing. Patient also continues to display extreme  decrease in muscular endurance and requested to end session early after minimal exercises completed. She is most fatigued with standing exercises as compared to supine. Patient has not yet met short term or long term goals but continues to progress towards them. PT believes that a potential barrier may be patient's compliance with attending therapy as she has not been seen in the clinic in 3 weeks. PT will schedule 1 more month of therapy and reassess again. If no significant changes noted then patient will be appropriate for discharge with independent Home exercise program.     Short Term Goals (6 weeks):  1. Patient to be independent with initial HEP to supplement therapy sessions. Progressing, Not Met  2. Patient to improve impaired mm groups to 75% LSI to improve strength for functional mobility. Progressing, Not Met  3. Patient to report decreased pain at worst to less than or equal to 3/10. Progressing, Not Met     Long Term Goals (12 weeks):   1. Patient to be independent with updated HEP to supplement therapy sessions. Progressing, Not Met  2. Patient to improve impaired mm groups to greater than or equal to 95% LSI to improve strength for functional mobility.Progressing, Not Met  3. Patient to report no pain an B hips. Progressing, Not Met  4. Patient to return to PLOF with no pain. Progressing, Not Met     Reasons for Recertification of Therapy:   improve muscular endurance ad pain      PLAN     Updated Certification Period: 12/20/23 to 1/30/24   Recommended Treatment Plan: 1 times per week for 6 weeks:  Manual Therapy, Moist Heat/ Ice, Neuromuscular Re-ed, Patient Education, Therapeutic Activities, and Therapeutic Exercise  Other Recommendations: TANIYA Artis, PT,DPT  12/20/2023

## 2024-01-03 NOTE — PROGRESS NOTES
"   [facility]   Physical Therapy Treatment Note      Name: Dede Silver  Clinic Number: [id number]    Therapy Diagnosis:   No diagnosis found.        Physician: Tim Sawant, *    Visit Date: 1/4/2024     Physician Orders: PT Eval and Treat  Medical Diagnosis from Referral: L40.50 (ICD-10-CM) - Psoriatic arthritis: Evaluate and treat anterior thigh pain / quadriceps tendinitis  Evaluation Date: 5/3/2023  Authorization Period Expiration: 12/31/2023  Plan of Care Expiration: 5/3/2023 to 1/30/24  Visit # / Visits authorized: 23/40 (+Evaluation)     PTA Visit: 1/5    FOTO: DONE     Time In:  2:00 pm  Time Out: 2:44pm  Total Billable Time: 40  minutes (3TE, )     Precautions: Psoriatic arthritis       Subjective     Pt reports: She's still in her most recent flare up.  She almost didn't come today but then decided she really needed to since she'd missed some of her last ones.  She's really tired today.     She  was somewhat  compliant with home exercise program.  Response to previous treatment: a little sore  Functional change: NA    Pain: 6/10  Location: bilateral hips      Objective      LSI performed 12/20/23    UPDATED Plan of Care 12/20/23  Treatment     Dede received the treatments listed below:      Dede received therapeutic exercises to develop strength, endurance, core strength, and flexibility for 40 minutes including:  Pelvic Tilts: 30x, 3" holds  Dead Bugs (arms only): 4x5, alternating arms  Supine Clamshells: 3x10, RTB, (last 10 w/o resistance)  Standing Hip Abduction: 4x5 on L, 15x on R, YTB, B  Patient education      neuromuscular re-education activities to improve: Coordination, Proprioception, and Posture for 4 minutes. The following activities were included:  Sit to Stands: 3x10, high/low mat, 10# kettlebell  Forward Step-ups: 15x, 6" step, B  Standing resisted marches: 2x10, YTB at balls of feet        Home Exercises and Patient Education Provided     Education " provided:   - Importance and role of physical therapy  - Proper body mechanics when performing HEP     Written Home Exercises Provided: yes.  Exercises were reviewed and Dede was able to demonstrate them prior to the end of the session.  Dede demonstrated good  understanding of the education provided.      See EMR under Patient Instructions for exercises provided 5/3/2023.     Assessment     Dede presented to PT reporting continued fatigue and pain, which she attributes to her most recent psoriatic arthritis flare up.  Increased number of reps and sets to most exercises for improved damien LE strength, which she was able to perform.  Dede was fatigued following today's session, requesting to end the session 15 minutes early secondary to fatigue.  Advised Dede of importance of consistent attendance of PT sessions for appropriate strength and ROM progressions, to which she verbalized understanding.        Pt prognosis is Fair.   Pt will benefit from skilled outpatient Physical Therapy to address the deficits stated above and in the chart below, provide pt/family education, and to maximize pt's level of independence.      Plan of care discussed with patient: Yes  Pt's spiritual, cultural and educational needs considered and pt agreeable to plan of care and goals as stated below:      Anticipated Barriers for therapy: COVID-19        Goals:   Short Term Goals (6 weeks):  1. Patient to be independent with initial HEP to supplement therapy sessions. Progressing, Not Met  2. Patient to improve impaired mm groups to 75% LSI to improve strength for functional mobility. Progressing, Not Met  3. Patient to report decreased pain at worst to less than or equal to 3/10. Progressing, Not Met     Long Term Goals (12 weeks):   1. Patient to be independent with updated HEP to supplement therapy sessions. Progressing, Not Met  2. Patient to improve impaired mm groups to greater than or equal to 95% LSI to improve strength for  "functional mobility.Progressing, Not Met  3. Patient to report no pain an B hips. Progressing, Not Met  4. Patient to return to PLOF with no pain. Progressing, Not Met       Plan     Continue to improve damien LE strength and hip mobility.     Not performed today:  Supine SLR's: 2x5 B  DL Gluteal Bridges: 2x6, 5" holds  Supine Clamshells: 3x6, GTB   LAQ's: 2x7, BlueTB, B  1/2 kneeling hip flexor stretch: 15"x3    Not performed today/OOT:   TA Contractions: 3x10, 5" holds  TA Contractions with marches: 3x10, alternating marches  Dead Bugs (legs only): 4x5, alternating arms    Amirah Ashwin, PTA  01/04/2024        "

## 2024-01-04 ENCOUNTER — CLINICAL SUPPORT (OUTPATIENT)
Dept: REHABILITATION | Facility: HOSPITAL | Age: 44
End: 2024-01-04
Payer: COMMERCIAL

## 2024-01-04 DIAGNOSIS — L40.50 PSORIATIC ARTHRITIS: Primary | ICD-10-CM

## 2024-01-04 PROCEDURE — 97110 THERAPEUTIC EXERCISES: CPT | Mod: PN,CQ

## 2024-01-10 NOTE — PROGRESS NOTES
"   [facility]   Physical Therapy Treatment Note      Name: Dede Silver  Clinic Number: [id number]    Therapy Diagnosis:   No diagnosis found.        Physician: Tim Sawant, *    Visit Date: 1/11/2024     Physician Orders: PT Eval and Treat  Medical Diagnosis from Referral: L40.50 (ICD-10-CM) - Psoriatic arthritis: Evaluate and treat anterior thigh pain / quadriceps tendinitis  Evaluation Date: 5/3/2023  Authorization Period Expiration: 12/31/2023  Plan of Care Expiration: 5/3/2023 to 1/30/24  Visit # / Visits authorized: 24/40 (+Evaluation)     PTA Visit: 2/5    FOTO: DONE     Time In:  9:00 am  Time Out: 9:45 am  Total Billable Time: 45  minutes (2TE, 1NMR )     Precautions: Psoriatic arthritis       Subjective     Pt reports: She was pretty sore after that last visit. She had to bring her son with her today, but she's going to do as much as she can.   She  was somewhat  compliant with home exercise program.  Response to previous treatment: a little sore  Functional change: NA    Pain: 6/10  Location: bilateral hips      Objective      LSI performed 12/20/23    UPDATED Plan of Care 12/20/23  Treatment     Dede received the treatments listed below:      Dede received therapeutic exercises to develop strength, endurance, core strength, and flexibility for 37 minutes including:  Pelvic Tilts: 30x, 3" holds  Dead Bugs (arms only): 4x8, alternating arms  Supine Clamshells: 3x10, RTB, (last 10 w/o resistance)  Standing Hip Abduction: 4x5 on L, 15x on R, YTB, B  +LAQs YTB 3x8  Patient education      neuromuscular re-education activities to improve: Coordination, Proprioception, and Posture for 8 minutes. The following activities were included:  Sit to Stands: 3x5, high/low mat,   Forward Step-ups: 15x, 6" step, B  Standing resisted marches: +3x10, YTB at balls of feet        Home Exercises and Patient Education Provided     Education provided:   - Importance and role of physical " therapy  - Proper body mechanics when performing HEP     Written Home Exercises Provided: yes.  Exercises were reviewed and Dede was able to demonstrate them prior to the end of the session.  Dede demonstrated good  understanding of the education provided.      See EMR under Patient Instructions for exercises provided 5/3/2023.     Assessment   Dede presented to PT accompanied by her child, reporting continued fatigue and soreness following previous PT session. Continued to progress difficulty to exercises with increased number of reps/sets for improved damien LE mm endurance and strength.  Dede was fatigued with progressions but able to complete without displaying compensatory techniques. Dede requested to end the session 15 minutes early, secondary to fatigue. Again advised heavy pt education regarding  importance of consistent attendance of PT sessions for appropriate strength and ROM progressions, to which she verbalized understanding.       Pt prognosis is Fair.   Pt will benefit from skilled outpatient Physical Therapy to address the deficits stated above and in the chart below, provide pt/family education, and to maximize pt's level of independence.      Plan of care discussed with patient: Yes  Pt's spiritual, cultural and educational needs considered and pt agreeable to plan of care and goals as stated below:      Anticipated Barriers for therapy: COVID-19        Goals:   Short Term Goals (6 weeks):  1. Patient to be independent with initial HEP to supplement therapy sessions. Progressing, Not Met  2. Patient to improve impaired mm groups to 75% LSI to improve strength for functional mobility. Progressing, Not Met  3. Patient to report decreased pain at worst to less than or equal to 3/10. Progressing, Not Met     Long Term Goals (12 weeks):   1. Patient to be independent with updated HEP to supplement therapy sessions. Progressing, Not Met  2. Patient to improve impaired mm groups to greater than or  "equal to 95% LSI to improve strength for functional mobility.Progressing, Not Met  3. Patient to report no pain an B hips. Progressing, Not Met  4. Patient to return to PLOF with no pain. Progressing, Not Met       Plan     Continue to improve damien LE strength and hip mobility.     Not performed today:  Supine SLR's: 2x5 B  DL Gluteal Bridges: 2x6, 5" holds  Supine Clamshells: 3x6, GTB   LAQ's: 2x7, BlueTB, B  1/2 kneeling hip flexor stretch: 15"x3    Not performed today/OOT:   TA Contractions: 3x10, 5" holds  TA Contractions with marches: 3x10, alternating marches  Dead Bugs (legs only): 4x5, alternating arms    Amirah Ashwin, PTA  01/10/2024        "

## 2024-01-11 ENCOUNTER — CLINICAL SUPPORT (OUTPATIENT)
Dept: REHABILITATION | Facility: HOSPITAL | Age: 44
End: 2024-01-11
Payer: COMMERCIAL

## 2024-01-11 DIAGNOSIS — M25.552 BILATERAL HIP PAIN: Primary | ICD-10-CM

## 2024-01-11 DIAGNOSIS — M25.551 BILATERAL HIP PAIN: Primary | ICD-10-CM

## 2024-01-11 PROCEDURE — 97112 NEUROMUSCULAR REEDUCATION: CPT | Mod: PN,CQ

## 2024-01-11 PROCEDURE — 97110 THERAPEUTIC EXERCISES: CPT | Mod: PN,CQ

## 2024-01-24 NOTE — PROGRESS NOTES
[facility]   Physical Therapy Treatment/discharge Note      Name: Dede Silver  Clinic Number: [id number]    Therapy Diagnosis:   Encounter Diagnosis   Name Primary?    Bilateral hip pain Yes       Physician: Tim Sawant, *    Visit Date: 2024     Physician Orders: PT Eval and Treat  Medical Diagnosis from Referral: L40.50 (ICD-10-CM) - Psoriatic arthritis: Evaluate and treat anterior thigh pain / quadriceps tendinitis  Evaluation Date: 5/3/2023  Authorization Period Expiration: 2023  Plan of Care Expiration: 5/3/2023 to 24  Visit # / Visits authorized:  (+Evaluation)     PTA Visit:     FOTO: DONE     Time In:  1101am  Time Out: 1141 am  Total Billable Time: 40  minutes (3TE)     Precautions: Psoriatic arthritis       Subjective     Pt reports: Today is a good day. She cancelled last week because she was sick. She had a flare up and she could not leave.   She  was somewhat  compliant with home exercise program.  Response to previous treatment: a little sore  Functional change: NA    Pain: 6/10  Location: bilateral hips      Objective      Update:  Gait belt used for quadriceps testing.     Lower extremity strength with [employer] handheld dynamometer Right  (Kgf) Left  (Kgf) Pain/dysfunction with movement   (approx 4 sec hold w/ max contraction)   Hip flexion 11.4  8.7  7.1        AV 9.7  8.3  5.5        7.8  115%   Hip abduction 7.2  6.4  6.3    AV.3 8.3  7.6  7     7.6 83%   Hip IR 9.7  8.3  8.6     AV.7 6.5  6.8  6.2     6.5 133%   Hip ER 7.3  6.5  6.7     AV.8 4.7  5.3  6.2     5.4 125%   Hip Ext 4.2  4.3  4.5        AV.3 5.2  5  5        5.1  81%   Quadriceps 20.2  16.1  14.1     AV.8 11.7  10.8  11     11.2  140%   Hamstrings 11.8  8.3  7.3     AV.1 7.8  6.8  5.7     6.8  133%      Treatment     Dede received the treatments listed below:      Dede received therapeutic exercises to develop strength, endurance, core strength,  "and flexibility for 40 minutes including:  Pelvic Tilts: 30x, 3" holds  Dead Bugs (arms only): 4x8, alternating arms  Supine Clamshells: 3x10, RTB,  Standing Hip Abduction: 4x5 , RTB, B  LAQs RTB 3x8  reassessment  Patient education    Home Exercises and Patient Education Provided     Education provided:   - updated Home exercise program and discharge     Written Home Exercises Provided: yes.  Exercises were reviewed and Dede was able to demonstrate them prior to the end of the session.  Dede demonstrated good  understanding of the education provided.      See EMR under Patient Instructions for exercises provided 5/3/2023.     Assessment   Patient was discharged with independent Home exercise program to continue to maintain and progress at home as appropriate. Patient was agreeable to plan. She has displayed improvement in Right hip strength as compared to contralateral limb and is appropriate for discharge at this time. PT provided patient with therabands for home.       Pt prognosis is Fair.        Plan of care discussed with patient: Yes  Pt's spiritual, cultural and educational needs considered and pt agreeable to plan of care and goals as stated below:      Anticipated Barriers for therapy: COVID-19        Goals:   Short Term Goals (6 weeks):  1. Patient to be independent with initial HEP to supplement therapy sessions. Met  2. Patient to improve impaired mm groups to 75% LSI to improve strength for functional mobility.  Met  3. Patient to report decreased pain at worst to less than or equal to 3/10. Not Met     Long Term Goals (12 weeks):   1. Patient to be independent with updated HEP to supplement therapy sessions.  Met  2. Patient to improve impaired mm groups to greater than or equal to 95% LSI to improve strength for functional mobility.Not Met  3. Patient to report no pain an B hips. Not Met  4. Patient to return to PLOF with no pain. Not Met       Plan     Discharge with independent Home exercise " program     Karo Artis, PT,DPT  01/25/2024

## 2024-01-25 ENCOUNTER — CLINICAL SUPPORT (OUTPATIENT)
Dept: REHABILITATION | Facility: HOSPITAL | Age: 44
End: 2024-01-25
Payer: COMMERCIAL

## 2024-01-25 DIAGNOSIS — M25.552 BILATERAL HIP PAIN: Primary | ICD-10-CM

## 2024-01-25 DIAGNOSIS — M25.551 BILATERAL HIP PAIN: Primary | ICD-10-CM

## 2024-01-25 PROCEDURE — 97110 THERAPEUTIC EXERCISES: CPT | Mod: PN

## 2024-02-15 DIAGNOSIS — L40.50 PSORIATIC ARTHRITIS: Chronic | ICD-10-CM

## 2024-02-15 DIAGNOSIS — L40.9 PSORIASIS OF SCALP: ICD-10-CM

## 2024-02-16 RX ORDER — METHOTREXATE 2.5 MG/1
20 TABLET ORAL
Qty: 40 TABLET | Refills: 2 | OUTPATIENT
Start: 2024-02-16

## 2024-02-16 NOTE — TELEPHONE ENCOUNTER
Medication refused due to failing protocol.    Requested Prescriptions   Pending Prescriptions Disp Refills    methotrexate 2.5 MG Tab 40 tablet 2     Sig: Take 8 tablets (20 mg total) by mouth every 7 days.       DMARD Refill Protocol Failed - 2/15/2024  4:47 PM        Failed - ALT within 3 months     Lab Results   Component Value Date    ALT 11 10/25/2023    ALT 10 05/29/2023    ALT 9 (L) 02/22/2023              Failed - AST within 3 months     Lab Results   Component Value Date    AST 9 (L) 10/25/2023    AST 10 05/29/2023    AST 9 (L) 02/22/2023              Failed - Creatinine within 3 months     Lab Results   Component Value Date    CREATININE 0.8 10/25/2023              Failed - Platelet count within 3 months     Lab Results   Component Value Date     10/25/2023              Failed - Lymphocyte count within 3 months     Lab Results   Component Value Date    LYMPH 1.7 10/25/2023    LYMPH 33.9 10/25/2023              Failed - Neutrophil count within 3 months     Lab Results   Component Value Date    GRAN 2.9 10/25/2023    GRAN 57.0 10/25/2023              Failed - WBC within 3 months     Lab Results   Component Value Date    WBC 5.05 10/25/2023    WBC 5.96 05/29/2023    WBC 6.71 02/22/2023

## 2024-02-29 ENCOUNTER — PATIENT MESSAGE (OUTPATIENT)
Dept: RHEUMATOLOGY | Facility: CLINIC | Age: 44
End: 2024-02-29
Payer: COMMERCIAL

## 2024-02-29 DIAGNOSIS — L40.50 PSORIATIC ARTHRITIS: Primary | Chronic | ICD-10-CM

## 2024-02-29 DIAGNOSIS — L40.9 PSORIASIS OF SCALP: ICD-10-CM

## 2024-02-29 DIAGNOSIS — Z79.899 HIGH RISK MEDICATION USE: ICD-10-CM

## 2024-02-29 RX ORDER — METHOTREXATE 2.5 MG/1
20 TABLET ORAL
Qty: 40 TABLET | Refills: 0 | Status: SHIPPED | OUTPATIENT
Start: 2024-02-29 | End: 2024-04-15 | Stop reason: SDUPTHER

## 2024-03-04 ENCOUNTER — LAB VISIT (OUTPATIENT)
Dept: LAB | Facility: HOSPITAL | Age: 44
End: 2024-03-04
Attending: INTERNAL MEDICINE
Payer: COMMERCIAL

## 2024-03-04 DIAGNOSIS — L40.50 PSORIATIC ARTHRITIS: Chronic | ICD-10-CM

## 2024-03-04 DIAGNOSIS — Z79.899 HIGH RISK MEDICATION USE: ICD-10-CM

## 2024-03-04 LAB
ALBUMIN SERPL BCP-MCNC: 3.9 G/DL (ref 3.5–5.2)
ALP SERPL-CCNC: 67 U/L (ref 55–135)
ALT SERPL W/O P-5'-P-CCNC: 15 U/L (ref 10–44)
ANION GAP SERPL CALC-SCNC: 11 MMOL/L (ref 8–16)
AST SERPL-CCNC: 12 U/L (ref 10–40)
BASOPHILS # BLD AUTO: 0.03 K/UL (ref 0–0.2)
BASOPHILS NFR BLD: 0.4 % (ref 0–1.9)
BILIRUB SERPL-MCNC: 0.3 MG/DL (ref 0.1–1)
BUN SERPL-MCNC: 11 MG/DL (ref 6–20)
CALCIUM SERPL-MCNC: 9.3 MG/DL (ref 8.7–10.5)
CHLORIDE SERPL-SCNC: 105 MMOL/L (ref 95–110)
CO2 SERPL-SCNC: 22 MMOL/L (ref 23–29)
CREAT SERPL-MCNC: 0.8 MG/DL (ref 0.5–1.4)
CRP SERPL-MCNC: 3.4 MG/L (ref 0–8.2)
DIFFERENTIAL METHOD BLD: ABNORMAL
EOSINOPHIL # BLD AUTO: 0.1 K/UL (ref 0–0.5)
EOSINOPHIL NFR BLD: 1.6 % (ref 0–8)
ERYTHROCYTE [DISTWIDTH] IN BLOOD BY AUTOMATED COUNT: 15.3 % (ref 11.5–14.5)
ERYTHROCYTE [SEDIMENTATION RATE] IN BLOOD BY PHOTOMETRIC METHOD: 13 MM/HR (ref 0–36)
EST. GFR  (NO RACE VARIABLE): >60 ML/MIN/1.73 M^2
GLUCOSE SERPL-MCNC: 178 MG/DL (ref 70–110)
HCT VFR BLD AUTO: 33.2 % (ref 37–48.5)
HGB BLD-MCNC: 10.6 G/DL (ref 12–16)
IMM GRANULOCYTES # BLD AUTO: 0.04 K/UL (ref 0–0.04)
IMM GRANULOCYTES NFR BLD AUTO: 0.6 % (ref 0–0.5)
LYMPHOCYTES # BLD AUTO: 2.2 K/UL (ref 1–4.8)
LYMPHOCYTES NFR BLD: 32.2 % (ref 18–48)
MCH RBC QN AUTO: 28.1 PG (ref 27–31)
MCHC RBC AUTO-ENTMCNC: 31.9 G/DL (ref 32–36)
MCV RBC AUTO: 88 FL (ref 82–98)
MONOCYTES # BLD AUTO: 0.4 K/UL (ref 0.3–1)
MONOCYTES NFR BLD: 5.6 % (ref 4–15)
NEUTROPHILS # BLD AUTO: 4 K/UL (ref 1.8–7.7)
NEUTROPHILS NFR BLD: 59.6 % (ref 38–73)
NRBC BLD-RTO: 0 /100 WBC
PLATELET # BLD AUTO: 261 K/UL (ref 150–450)
PMV BLD AUTO: 10.5 FL (ref 9.2–12.9)
POTASSIUM SERPL-SCNC: 3.7 MMOL/L (ref 3.5–5.1)
PROT SERPL-MCNC: 7.2 G/DL (ref 6–8.4)
RBC # BLD AUTO: 3.77 M/UL (ref 4–5.4)
SODIUM SERPL-SCNC: 138 MMOL/L (ref 136–145)
WBC # BLD AUTO: 6.74 K/UL (ref 3.9–12.7)

## 2024-03-04 PROCEDURE — 85652 RBC SED RATE AUTOMATED: CPT | Performed by: INTERNAL MEDICINE

## 2024-03-04 PROCEDURE — 80053 COMPREHEN METABOLIC PANEL: CPT | Performed by: INTERNAL MEDICINE

## 2024-03-04 PROCEDURE — 36415 COLL VENOUS BLD VENIPUNCTURE: CPT | Mod: PO | Performed by: INTERNAL MEDICINE

## 2024-03-04 PROCEDURE — 86140 C-REACTIVE PROTEIN: CPT | Performed by: INTERNAL MEDICINE

## 2024-03-04 PROCEDURE — 85025 COMPLETE CBC W/AUTO DIFF WBC: CPT | Performed by: INTERNAL MEDICINE

## 2024-03-18 DIAGNOSIS — L40.50 PSORIATIC ARTHRITIS: Chronic | ICD-10-CM

## 2024-03-18 RX ORDER — SULFASALAZINE 500 MG/1
1500 TABLET ORAL 2 TIMES DAILY
Qty: 180 TABLET | Refills: 2 | Status: SHIPPED | OUTPATIENT
Start: 2024-03-18

## 2024-03-18 RX ORDER — CELECOXIB 200 MG/1
200 CAPSULE ORAL 2 TIMES DAILY PRN
Qty: 60 CAPSULE | Refills: 2 | Status: SHIPPED | OUTPATIENT
Start: 2024-03-18

## 2024-04-15 DIAGNOSIS — Z79.899 HIGH RISK MEDICATION USE: ICD-10-CM

## 2024-04-15 DIAGNOSIS — L40.50 PSORIATIC ARTHRITIS: Chronic | ICD-10-CM

## 2024-04-15 DIAGNOSIS — L40.9 PSORIASIS OF SCALP: ICD-10-CM

## 2024-04-15 DIAGNOSIS — Z79.1 NSAID LONG-TERM USE: ICD-10-CM

## 2024-04-15 RX ORDER — METHOTREXATE 2.5 MG/1
20 TABLET ORAL
Qty: 34 TABLET | Refills: 2 | Status: SHIPPED | OUTPATIENT
Start: 2024-04-15

## 2024-04-15 RX ORDER — PANTOPRAZOLE SODIUM 40 MG/1
40 TABLET, DELAYED RELEASE ORAL DAILY
Qty: 30 TABLET | Refills: 11 | Status: SHIPPED | OUTPATIENT
Start: 2024-04-15 | End: 2025-04-15

## 2024-06-11 DIAGNOSIS — L40.9 SCALP PSORIASIS: ICD-10-CM

## 2024-06-12 RX ORDER — KETOCONAZOLE 20 MG/ML
SHAMPOO, SUSPENSION TOPICAL
Qty: 120 ML | Refills: 5 | Status: SHIPPED | OUTPATIENT
Start: 2024-06-13

## 2024-06-17 ENCOUNTER — PATIENT MESSAGE (OUTPATIENT)
Dept: RHEUMATOLOGY | Facility: CLINIC | Age: 44
End: 2024-06-17
Payer: COMMERCIAL

## 2024-06-17 ENCOUNTER — LAB VISIT (OUTPATIENT)
Dept: LAB | Facility: HOSPITAL | Age: 44
End: 2024-06-17
Attending: INTERNAL MEDICINE
Payer: COMMERCIAL

## 2024-06-17 DIAGNOSIS — L40.50 PSORIATIC ARTHRITIS: Chronic | ICD-10-CM

## 2024-06-17 DIAGNOSIS — Z79.899 HIGH RISK MEDICATION USE: ICD-10-CM

## 2024-06-17 LAB
ALBUMIN SERPL BCP-MCNC: 3.8 G/DL (ref 3.5–5.2)
ALP SERPL-CCNC: 64 U/L (ref 55–135)
ALT SERPL W/O P-5'-P-CCNC: 11 U/L (ref 10–44)
ANION GAP SERPL CALC-SCNC: 6 MMOL/L (ref 8–16)
AST SERPL-CCNC: 11 U/L (ref 10–40)
BASOPHILS # BLD AUTO: 0.04 K/UL (ref 0–0.2)
BASOPHILS NFR BLD: 0.6 % (ref 0–1.9)
BILIRUB SERPL-MCNC: 0.3 MG/DL (ref 0.1–1)
BUN SERPL-MCNC: 12 MG/DL (ref 6–20)
CALCIUM SERPL-MCNC: 9.2 MG/DL (ref 8.7–10.5)
CHLORIDE SERPL-SCNC: 106 MMOL/L (ref 95–110)
CO2 SERPL-SCNC: 25 MMOL/L (ref 23–29)
CREAT SERPL-MCNC: 0.9 MG/DL (ref 0.5–1.4)
CRP SERPL-MCNC: 3 MG/L (ref 0–8.2)
DIFFERENTIAL METHOD BLD: ABNORMAL
EOSINOPHIL # BLD AUTO: 0.1 K/UL (ref 0–0.5)
EOSINOPHIL NFR BLD: 1 % (ref 0–8)
ERYTHROCYTE [DISTWIDTH] IN BLOOD BY AUTOMATED COUNT: 16.1 % (ref 11.5–14.5)
ERYTHROCYTE [SEDIMENTATION RATE] IN BLOOD BY PHOTOMETRIC METHOD: 12 MM/HR (ref 0–36)
EST. GFR  (NO RACE VARIABLE): >60 ML/MIN/1.73 M^2
GLUCOSE SERPL-MCNC: 145 MG/DL (ref 70–110)
HCT VFR BLD AUTO: 32 % (ref 37–48.5)
HGB BLD-MCNC: 9.8 G/DL (ref 12–16)
IMM GRANULOCYTES # BLD AUTO: 0.03 K/UL (ref 0–0.04)
IMM GRANULOCYTES NFR BLD AUTO: 0.4 % (ref 0–0.5)
LYMPHOCYTES # BLD AUTO: 2.2 K/UL (ref 1–4.8)
LYMPHOCYTES NFR BLD: 32.3 % (ref 18–48)
MCH RBC QN AUTO: 27 PG (ref 27–31)
MCHC RBC AUTO-ENTMCNC: 30.6 G/DL (ref 32–36)
MCV RBC AUTO: 88 FL (ref 82–98)
MONOCYTES # BLD AUTO: 0.5 K/UL (ref 0.3–1)
MONOCYTES NFR BLD: 7.5 % (ref 4–15)
NEUTROPHILS # BLD AUTO: 4 K/UL (ref 1.8–7.7)
NEUTROPHILS NFR BLD: 58.2 % (ref 38–73)
NRBC BLD-RTO: 0 /100 WBC
PLATELET # BLD AUTO: 216 K/UL (ref 150–450)
PMV BLD AUTO: 9.6 FL (ref 9.2–12.9)
POTASSIUM SERPL-SCNC: 4.5 MMOL/L (ref 3.5–5.1)
PROT SERPL-MCNC: 7.1 G/DL (ref 6–8.4)
RBC # BLD AUTO: 3.63 M/UL (ref 4–5.4)
SODIUM SERPL-SCNC: 137 MMOL/L (ref 136–145)
WBC # BLD AUTO: 6.81 K/UL (ref 3.9–12.7)

## 2024-06-17 PROCEDURE — 36415 COLL VENOUS BLD VENIPUNCTURE: CPT | Performed by: INTERNAL MEDICINE

## 2024-06-17 PROCEDURE — 80053 COMPREHEN METABOLIC PANEL: CPT | Performed by: INTERNAL MEDICINE

## 2024-06-17 PROCEDURE — 85025 COMPLETE CBC W/AUTO DIFF WBC: CPT | Performed by: INTERNAL MEDICINE

## 2024-06-17 PROCEDURE — 86140 C-REACTIVE PROTEIN: CPT | Performed by: INTERNAL MEDICINE

## 2024-06-17 PROCEDURE — 85652 RBC SED RATE AUTOMATED: CPT | Performed by: INTERNAL MEDICINE

## 2024-06-18 ENCOUNTER — OFFICE VISIT (OUTPATIENT)
Dept: RHEUMATOLOGY | Facility: CLINIC | Age: 44
End: 2024-06-18
Payer: COMMERCIAL

## 2024-06-18 DIAGNOSIS — Z79.899 HIGH RISK MEDICATION USE: ICD-10-CM

## 2024-06-18 DIAGNOSIS — Z79.899 DRUG-INDUCED IMMUNODEFICIENCY: ICD-10-CM

## 2024-06-18 DIAGNOSIS — D50.0 IRON DEFICIENCY ANEMIA DUE TO CHRONIC BLOOD LOSS: ICD-10-CM

## 2024-06-18 DIAGNOSIS — L40.50 PSORIATIC ARTHRITIS: Primary | ICD-10-CM

## 2024-06-18 DIAGNOSIS — E66.01 CLASS 2 SEVERE OBESITY DUE TO EXCESS CALORIES WITH SERIOUS COMORBIDITY AND BODY MASS INDEX (BMI) OF 36.0 TO 36.9 IN ADULT: ICD-10-CM

## 2024-06-18 DIAGNOSIS — D84.821 DRUG-INDUCED IMMUNODEFICIENCY: ICD-10-CM

## 2024-06-18 PROBLEM — E66.812 CLASS 2 SEVERE OBESITY DUE TO EXCESS CALORIES WITH SERIOUS COMORBIDITY AND BODY MASS INDEX (BMI) OF 36.0 TO 36.9 IN ADULT: Status: ACTIVE | Noted: 2024-06-18

## 2024-06-18 PROCEDURE — 1160F RVW MEDS BY RX/DR IN RCRD: CPT | Mod: CPTII,95,, | Performed by: INTERNAL MEDICINE

## 2024-06-18 PROCEDURE — 99214 OFFICE O/P EST MOD 30 MIN: CPT | Mod: 95,,, | Performed by: INTERNAL MEDICINE

## 2024-06-18 PROCEDURE — 1159F MED LIST DOCD IN RCRD: CPT | Mod: CPTII,95,, | Performed by: INTERNAL MEDICINE

## 2024-06-18 ASSESSMENT — ROUTINE ASSESSMENT OF PATIENT INDEX DATA (RAPID3)
TOTAL RAPID3 SCORE: 4.61
FATIGUE SCORE: 7.5
PSYCHOLOGICAL DISTRESS SCORE: 0
PAIN SCORE: 5.5
PATIENT GLOBAL ASSESSMENT SCORE: 7
MDHAQ FUNCTION SCORE: 0.4

## 2024-06-18 NOTE — PROGRESS NOTES
Subjective:       Patient ID: Dede Silver is a 43 y.o. female.    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 Right Dorsal Ulnar cutaneous neuropathy   2. Right Sural neuropathy   3. 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      Also saw hematology for kappa light chains 2021; normal CBC and other labs including CARMEN/RF/CCP     No family here    6 mo to 17 y/o     2021 started SSZ; 1500 bid  May 2022 HCQ added; then stopped Oct 2022   Oct 2022 stopped hydroxychloroquine and added methotrexate     Celebrex      The patient location is: home/[place]  The chief complaint leading to consultation is: PsA    Visit type: audiovisual    Face to Face time with patient: 22 min  30 minutes of total time spent on the encounter, which includes face to face time and non-face to face time preparing to see the patient (eg, review of tests), Obtaining and/or reviewing separately obtained history, Documenting clinical information in the electronic or other health record, Independently interpreting results (not separately reported) and communicating results to the patient/family/caregiver, or Care coordination (not separately reported).         Each patient to whom he or she provides medical services by telemedicine is:  (1) informed of the relationship between the physician and patient and the respective role of any other health care provider with respect to management of the patient; and (2) notified that he or she may decline to receive medical services by telemedicine and  may withdraw from such care at any time.    Notes:  Has continued methotrexate plus sulfasalazine   Currently 17.5 mg/week methotrexate   Sulfasalazine 3 twice daily  Celecoxib daily  Pantoprazole daily    No rash  No swelling  Joint pain ok on current regimen but was more symptomatic when too 12. 5 mg/week of methotrexate and currently has to take celcoxib daily    She has noted short of breath when walking and some fatigue   Lab 6/17/24  CBC WBC 6.81, plat 216; Hgb 9.8  CMP Cr 0.9; alb 3.8, AST/ALT 11/11  ESR 12  CRP 3      Review of Systems   Constitutional:  Positive for fatigue. Negative for fever and unexpected weight change.   HENT:  Negative for mouth sores and trouble swallowing.    Eyes:  Positive for redness.        Some eye soreness; not really red; eyelids feel heavy; tried anti-allergic drop yesterday   Respiratory:  Positive for shortness of breath. Negative for cough.    Cardiovascular:  Negative for chest pain.   Gastrointestinal:  Negative for constipation and diarrhea.   Genitourinary:  Negative for dysuria and genital sores.   Skin:  Negative for rash.   Neurological:  Negative for headaches.   Hematological:  Does not bruise/bleed easily.           6/18/2024     9:32 AM   Rapid3 Question Responses and Scores   MDHAQ Score 0.4   Psychologic Score 0   Pain Score 5.5   When you awakened in the morning OVER THE LAST WEEK, did you feel stiff? Yes   If Yes, please indicate the number of hours until you are as limber as you will be for the day 4   Fatigue Score 7.5   Global Health Score 7   RAPID3 Score 4.61      Objective:   There were no vitals taken for this visit.     Physical Exam      Assessment:       1. Psoriatic arthritis    2. Class 2 severe obesity due to excess calories with serious comorbidity and body mass index (BMI) of 36.0 to 36.9 in adult    3. Drug-induced immunodeficiency    4. Iron deficiency anemia due to chronic blood loss    5. High risk medication use            Plan:        Problem List Items Addressed This Visit          Active Problems    Psoriatic arthritis - Primary (Chronic)     Psoriatic arthritis sounds controlled on methotrexate plus sulfasalazine without adverse effects    She has had some eye symptoms that do not sound like iritis but she will consult eye doctor if symptoms persist    Otherwise her c/o is fatigue and discussed iron supplementation for anemia  She also had elevated random glucose and never had A1c that PCP ordered and has been reluctant to follow up with PCP so I will order these now and she will decide whom to see         High risk medication use     No treatment related adverse effects; will continue to monitor for drug toxicity           Drug-induced immunodeficiency     No interval infections         Iron deficiency anemia due to chronic blood loss     Anemia likely due to inadequate iron so she will try to increase dietary iron intake and I will supplement iron and recheck CBC          Relevant Orders    Iron and TIBC    Class 2 severe obesity due to excess calories with serious comorbidity and body mass index (BMI) of 36.0 to 36.9 in adult    Relevant Orders    Lipid Panel    Hemoglobin A1C

## 2024-06-18 NOTE — ASSESSMENT & PLAN NOTE
Anemia likely due to inadequate iron so she will try to increase dietary iron intake and I will supplement iron and recheck CBC

## 2024-06-18 NOTE — ASSESSMENT & PLAN NOTE
Psoriatic arthritis sounds controlled on methotrexate plus sulfasalazine without adverse effects    She has had some eye symptoms that do not sound like iritis but she will consult eye doctor if symptoms persist    Otherwise her c/o is fatigue and discussed iron supplementation for anemia  She also had elevated random glucose and never had A1c that PCP ordered and has been reluctant to follow up with PCP so I will order these now and she will decide whom to see

## 2024-06-21 ENCOUNTER — LAB VISIT (OUTPATIENT)
Dept: LAB | Facility: HOSPITAL | Age: 44
End: 2024-06-21
Attending: INTERNAL MEDICINE
Payer: COMMERCIAL

## 2024-06-21 DIAGNOSIS — D50.0 IRON DEFICIENCY ANEMIA DUE TO CHRONIC BLOOD LOSS: ICD-10-CM

## 2024-06-21 DIAGNOSIS — E66.01 CLASS 2 SEVERE OBESITY DUE TO EXCESS CALORIES WITH SERIOUS COMORBIDITY AND BODY MASS INDEX (BMI) OF 36.0 TO 36.9 IN ADULT: ICD-10-CM

## 2024-06-21 LAB
CHOLEST SERPL-MCNC: 238 MG/DL (ref 120–199)
CHOLEST/HDLC SERPL: 5.5 {RATIO} (ref 2–5)
ESTIMATED AVG GLUCOSE: 91 MG/DL (ref 68–131)
HBA1C MFR BLD: 4.8 % (ref 4–5.6)
HDLC SERPL-MCNC: 43 MG/DL (ref 40–75)
HDLC SERPL: 18.1 % (ref 20–50)
IRON SERPL-MCNC: 135 UG/DL (ref 30–160)
LDLC SERPL CALC-MCNC: 161.8 MG/DL (ref 63–159)
NONHDLC SERPL-MCNC: 195 MG/DL
SATURATED IRON: 25 % (ref 20–50)
TOTAL IRON BINDING CAPACITY: 536 UG/DL (ref 250–450)
TRANSFERRIN SERPL-MCNC: 362 MG/DL (ref 200–375)
TRIGL SERPL-MCNC: 166 MG/DL (ref 30–150)

## 2024-06-21 PROCEDURE — 83540 ASSAY OF IRON: CPT | Performed by: INTERNAL MEDICINE

## 2024-06-21 PROCEDURE — 36415 COLL VENOUS BLD VENIPUNCTURE: CPT | Performed by: INTERNAL MEDICINE

## 2024-06-21 PROCEDURE — 83036 HEMOGLOBIN GLYCOSYLATED A1C: CPT | Performed by: INTERNAL MEDICINE

## 2024-06-21 PROCEDURE — 80061 LIPID PANEL: CPT | Performed by: INTERNAL MEDICINE

## 2024-07-11 DIAGNOSIS — L40.9 PSORIASIS OF SCALP: ICD-10-CM

## 2024-07-11 DIAGNOSIS — L40.50 PSORIATIC ARTHRITIS: Chronic | ICD-10-CM

## 2024-07-11 RX ORDER — METHOTREXATE 2.5 MG/1
20 TABLET ORAL
Qty: 34 TABLET | Refills: 2 | Status: SHIPPED | OUTPATIENT
Start: 2024-07-11

## 2024-08-05 DIAGNOSIS — L40.50 PSORIATIC ARTHRITIS: Chronic | ICD-10-CM

## 2024-08-05 RX ORDER — SULFASALAZINE 500 MG/1
1500 TABLET ORAL 2 TIMES DAILY
Qty: 180 TABLET | Refills: 2 | Status: SHIPPED | OUTPATIENT
Start: 2024-08-05

## 2024-09-30 ENCOUNTER — LAB VISIT (OUTPATIENT)
Dept: LAB | Facility: HOSPITAL | Age: 44
End: 2024-09-30
Attending: INTERNAL MEDICINE
Payer: COMMERCIAL

## 2024-09-30 DIAGNOSIS — L40.50 PSORIATIC ARTHRITIS: Chronic | ICD-10-CM

## 2024-09-30 DIAGNOSIS — Z79.899 HIGH RISK MEDICATION USE: ICD-10-CM

## 2024-09-30 LAB
ALBUMIN SERPL BCP-MCNC: 4 G/DL (ref 3.5–5.2)
ALP SERPL-CCNC: 71 U/L (ref 55–135)
ALT SERPL W/O P-5'-P-CCNC: 14 U/L (ref 10–44)
ANION GAP SERPL CALC-SCNC: 10 MMOL/L (ref 8–16)
AST SERPL-CCNC: 11 U/L (ref 10–40)
BASOPHILS # BLD AUTO: 0.04 K/UL (ref 0–0.2)
BASOPHILS NFR BLD: 0.6 % (ref 0–1.9)
BILIRUB SERPL-MCNC: 0.2 MG/DL (ref 0.1–1)
BUN SERPL-MCNC: 9 MG/DL (ref 6–20)
CALCIUM SERPL-MCNC: 9.4 MG/DL (ref 8.7–10.5)
CHLORIDE SERPL-SCNC: 104 MMOL/L (ref 95–110)
CO2 SERPL-SCNC: 24 MMOL/L (ref 23–29)
CREAT SERPL-MCNC: 0.8 MG/DL (ref 0.5–1.4)
CRP SERPL-MCNC: 6.5 MG/L (ref 0–8.2)
DIFFERENTIAL METHOD BLD: ABNORMAL
EOSINOPHIL # BLD AUTO: 0.1 K/UL (ref 0–0.5)
EOSINOPHIL NFR BLD: 1.5 % (ref 0–8)
ERYTHROCYTE [DISTWIDTH] IN BLOOD BY AUTOMATED COUNT: 16.4 % (ref 11.5–14.5)
ERYTHROCYTE [SEDIMENTATION RATE] IN BLOOD BY PHOTOMETRIC METHOD: 14 MM/HR (ref 0–36)
EST. GFR  (NO RACE VARIABLE): >60 ML/MIN/1.73 M^2
GLUCOSE SERPL-MCNC: 147 MG/DL (ref 70–110)
HCT VFR BLD AUTO: 33.6 % (ref 37–48.5)
HGB BLD-MCNC: 10.3 G/DL (ref 12–16)
IMM GRANULOCYTES # BLD AUTO: 0.03 K/UL (ref 0–0.04)
IMM GRANULOCYTES NFR BLD AUTO: 0.4 % (ref 0–0.5)
LYMPHOCYTES # BLD AUTO: 2 K/UL (ref 1–4.8)
LYMPHOCYTES NFR BLD: 27.5 % (ref 18–48)
MCH RBC QN AUTO: 27.5 PG (ref 27–31)
MCHC RBC AUTO-ENTMCNC: 30.7 G/DL (ref 32–36)
MCV RBC AUTO: 90 FL (ref 82–98)
MONOCYTES # BLD AUTO: 0.5 K/UL (ref 0.3–1)
MONOCYTES NFR BLD: 7.2 % (ref 4–15)
NEUTROPHILS # BLD AUTO: 4.5 K/UL (ref 1.8–7.7)
NEUTROPHILS NFR BLD: 62.8 % (ref 38–73)
NRBC BLD-RTO: 0 /100 WBC
PLATELET # BLD AUTO: 245 K/UL (ref 150–450)
PMV BLD AUTO: 9.8 FL (ref 9.2–12.9)
POTASSIUM SERPL-SCNC: 4.1 MMOL/L (ref 3.5–5.1)
PROT SERPL-MCNC: 7.3 G/DL (ref 6–8.4)
RBC # BLD AUTO: 3.75 M/UL (ref 4–5.4)
SODIUM SERPL-SCNC: 138 MMOL/L (ref 136–145)
WBC # BLD AUTO: 7.2 K/UL (ref 3.9–12.7)

## 2024-09-30 PROCEDURE — 85652 RBC SED RATE AUTOMATED: CPT | Performed by: INTERNAL MEDICINE

## 2024-09-30 PROCEDURE — 85025 COMPLETE CBC W/AUTO DIFF WBC: CPT | Performed by: INTERNAL MEDICINE

## 2024-09-30 PROCEDURE — 86140 C-REACTIVE PROTEIN: CPT | Performed by: INTERNAL MEDICINE

## 2024-09-30 PROCEDURE — 36415 COLL VENOUS BLD VENIPUNCTURE: CPT | Performed by: INTERNAL MEDICINE

## 2024-09-30 PROCEDURE — 80053 COMPREHEN METABOLIC PANEL: CPT | Performed by: INTERNAL MEDICINE

## 2024-10-01 ENCOUNTER — PATIENT MESSAGE (OUTPATIENT)
Dept: RHEUMATOLOGY | Facility: CLINIC | Age: 44
End: 2024-10-01
Payer: COMMERCIAL

## 2024-10-02 DIAGNOSIS — L40.9 PSORIASIS OF SCALP: ICD-10-CM

## 2024-10-02 DIAGNOSIS — L40.9 SCALP PSORIASIS: ICD-10-CM

## 2024-10-02 DIAGNOSIS — L40.50 PSORIATIC ARTHRITIS: Chronic | ICD-10-CM

## 2024-10-02 RX ORDER — CELECOXIB 200 MG/1
200 CAPSULE ORAL 2 TIMES DAILY PRN
Qty: 60 CAPSULE | Refills: 2 | Status: SHIPPED | OUTPATIENT
Start: 2024-10-02

## 2024-10-02 RX ORDER — METHOTREXATE 2.5 MG/1
20 TABLET ORAL
Qty: 34 TABLET | Refills: 2 | Status: SHIPPED | OUTPATIENT
Start: 2024-10-02

## 2024-10-03 RX ORDER — CLOBETASOL PROPIONATE 0.5 MG/ML
SOLUTION TOPICAL 2 TIMES DAILY
Qty: 50 ML | Refills: 3 | OUTPATIENT
Start: 2024-10-03

## 2024-10-08 ENCOUNTER — TELEPHONE (OUTPATIENT)
Dept: OPHTHALMOLOGY | Facility: CLINIC | Age: 44
End: 2024-10-08
Payer: COMMERCIAL

## 2024-10-08 NOTE — TELEPHONE ENCOUNTER
----- Message from Cr sent at 10/8/2024  8:23 AM CDT -----  Regarding: appointment access  Contact: [phone]  Pt toddler kicked pt in left eye and pt eye is hurting and burning . Pt will like to be seen on today       Dede Silver  [phone]    Please contact pt with appointment .

## 2024-10-25 DIAGNOSIS — L40.9 SCALP PSORIASIS: ICD-10-CM

## 2024-10-25 RX ORDER — CLOBETASOL PROPIONATE 0.5 MG/ML
SOLUTION TOPICAL 2 TIMES DAILY
Qty: 50 ML | Refills: 3 | OUTPATIENT
Start: 2024-10-25

## 2024-11-18 ENCOUNTER — PATIENT MESSAGE (OUTPATIENT)
Dept: SPORTS MEDICINE | Facility: CLINIC | Age: 44
End: 2024-11-18
Payer: COMMERCIAL

## 2024-11-21 DIAGNOSIS — L40.9 SCALP PSORIASIS: ICD-10-CM

## 2024-11-27 RX ORDER — CLOBETASOL PROPIONATE 0.5 MG/ML
SOLUTION TOPICAL 2 TIMES DAILY
Qty: 50 ML | Refills: 3 | Status: SHIPPED | OUTPATIENT
Start: 2024-11-27

## 2024-12-18 DIAGNOSIS — L40.50 PSORIATIC ARTHRITIS: Chronic | ICD-10-CM

## 2024-12-18 DIAGNOSIS — L40.9 PSORIASIS OF SCALP: ICD-10-CM

## 2024-12-18 RX ORDER — METHOTREXATE 2.5 MG/1
20 TABLET ORAL
Qty: 34 TABLET | Refills: 2 | Status: CANCELLED | OUTPATIENT
Start: 2024-12-18

## 2024-12-19 DIAGNOSIS — L40.50 PSORIATIC ARTHRITIS: Chronic | ICD-10-CM

## 2024-12-19 DIAGNOSIS — L40.9 PSORIASIS OF SCALP: ICD-10-CM

## 2024-12-19 RX ORDER — METHOTREXATE 2.5 MG/1
20 TABLET ORAL
Qty: 34 TABLET | Refills: 2 | Status: CANCELLED | OUTPATIENT
Start: 2024-12-18

## 2024-12-26 ENCOUNTER — PATIENT MESSAGE (OUTPATIENT)
Dept: RHEUMATOLOGY | Facility: CLINIC | Age: 44
End: 2024-12-26
Payer: COMMERCIAL

## 2024-12-26 DIAGNOSIS — L40.9 PSORIASIS OF SCALP: ICD-10-CM

## 2024-12-26 DIAGNOSIS — L40.50 PSORIATIC ARTHRITIS: Chronic | ICD-10-CM

## 2024-12-26 RX ORDER — METHOTREXATE 2.5 MG/1
20 TABLET ORAL
Qty: 34 TABLET | Refills: 2 | Status: SHIPPED | OUTPATIENT
Start: 2024-12-26

## 2024-12-26 NOTE — TELEPHONE ENCOUNTER
Medication refill requested for methotrexate.  Patient last seen in office on 6/18/24 and labs done on 9/30/24.  Patient is scheduled for labs on 12/27/24 and has a virtual appointment on 3/11/25 at 10:00 am.  Order pended.

## 2024-12-27 ENCOUNTER — LAB VISIT (OUTPATIENT)
Dept: LAB | Facility: HOSPITAL | Age: 44
End: 2024-12-27
Attending: INTERNAL MEDICINE
Payer: COMMERCIAL

## 2024-12-27 DIAGNOSIS — L40.50 PSORIATIC ARTHRITIS: Chronic | ICD-10-CM

## 2024-12-27 DIAGNOSIS — Z79.899 HIGH RISK MEDICATION USE: ICD-10-CM

## 2024-12-27 LAB
ALBUMIN SERPL BCP-MCNC: 4.1 G/DL (ref 3.5–5.2)
ALP SERPL-CCNC: 70 U/L (ref 40–150)
ALT SERPL W/O P-5'-P-CCNC: 14 U/L (ref 10–44)
ANION GAP SERPL CALC-SCNC: 8 MMOL/L (ref 8–16)
AST SERPL-CCNC: 11 U/L (ref 10–40)
BASOPHILS # BLD AUTO: 0.02 K/UL (ref 0–0.2)
BASOPHILS NFR BLD: 0.3 % (ref 0–1.9)
BILIRUB SERPL-MCNC: 0.4 MG/DL (ref 0.1–1)
BUN SERPL-MCNC: 9 MG/DL (ref 6–20)
CALCIUM SERPL-MCNC: 9.3 MG/DL (ref 8.7–10.5)
CHLORIDE SERPL-SCNC: 108 MMOL/L (ref 95–110)
CO2 SERPL-SCNC: 22 MMOL/L (ref 23–29)
CREAT SERPL-MCNC: 0.8 MG/DL (ref 0.5–1.4)
CRP SERPL-MCNC: 6 MG/L (ref 0–8.2)
DIFFERENTIAL METHOD BLD: ABNORMAL
EOSINOPHIL # BLD AUTO: 0.1 K/UL (ref 0–0.5)
EOSINOPHIL NFR BLD: 1.3 % (ref 0–8)
ERYTHROCYTE [DISTWIDTH] IN BLOOD BY AUTOMATED COUNT: 15.8 % (ref 11.5–14.5)
ERYTHROCYTE [SEDIMENTATION RATE] IN BLOOD BY PHOTOMETRIC METHOD: 17 MM/HR (ref 0–36)
EST. GFR  (NO RACE VARIABLE): >60 ML/MIN/1.73 M^2
GLUCOSE SERPL-MCNC: 105 MG/DL (ref 70–110)
HCT VFR BLD AUTO: 32.4 % (ref 37–48.5)
HGB BLD-MCNC: 10.1 G/DL (ref 12–16)
IMM GRANULOCYTES # BLD AUTO: 0.02 K/UL (ref 0–0.04)
IMM GRANULOCYTES NFR BLD AUTO: 0.3 % (ref 0–0.5)
LYMPHOCYTES # BLD AUTO: 1.7 K/UL (ref 1–4.8)
LYMPHOCYTES NFR BLD: 27.7 % (ref 18–48)
MCH RBC QN AUTO: 27.9 PG (ref 27–31)
MCHC RBC AUTO-ENTMCNC: 31.2 G/DL (ref 32–36)
MCV RBC AUTO: 90 FL (ref 82–98)
MONOCYTES # BLD AUTO: 0.4 K/UL (ref 0.3–1)
MONOCYTES NFR BLD: 6.4 % (ref 4–15)
NEUTROPHILS # BLD AUTO: 3.8 K/UL (ref 1.8–7.7)
NEUTROPHILS NFR BLD: 64 % (ref 38–73)
NRBC BLD-RTO: 0 /100 WBC
PLATELET # BLD AUTO: 231 K/UL (ref 150–450)
PMV BLD AUTO: 9.8 FL (ref 9.2–12.9)
POTASSIUM SERPL-SCNC: 4.2 MMOL/L (ref 3.5–5.1)
PROT SERPL-MCNC: 7.5 G/DL (ref 6–8.4)
RBC # BLD AUTO: 3.62 M/UL (ref 4–5.4)
SODIUM SERPL-SCNC: 138 MMOL/L (ref 136–145)
WBC # BLD AUTO: 5.96 K/UL (ref 3.9–12.7)

## 2024-12-27 PROCEDURE — 86140 C-REACTIVE PROTEIN: CPT | Performed by: INTERNAL MEDICINE

## 2024-12-27 PROCEDURE — 80053 COMPREHEN METABOLIC PANEL: CPT | Performed by: INTERNAL MEDICINE

## 2024-12-27 PROCEDURE — 85025 COMPLETE CBC W/AUTO DIFF WBC: CPT | Performed by: INTERNAL MEDICINE

## 2024-12-27 PROCEDURE — 85652 RBC SED RATE AUTOMATED: CPT | Performed by: INTERNAL MEDICINE

## 2024-12-27 PROCEDURE — 36415 COLL VENOUS BLD VENIPUNCTURE: CPT | Performed by: INTERNAL MEDICINE

## 2025-03-06 DIAGNOSIS — L40.50 PSORIATIC ARTHRITIS: Chronic | ICD-10-CM

## 2025-03-06 DIAGNOSIS — L40.9 PSORIASIS OF SCALP: ICD-10-CM

## 2025-03-06 RX ORDER — SULFASALAZINE 500 MG/1
1500 TABLET ORAL 2 TIMES DAILY
Qty: 180 TABLET | Refills: 2 | Status: SHIPPED | OUTPATIENT
Start: 2025-03-06

## 2025-03-07 NOTE — TELEPHONE ENCOUNTER
Medication refill requested for methotrexate.  Last office visit on 6/18/24 and labs done on 12/27/24.  Order pended.

## 2025-03-10 RX ORDER — METHOTREXATE 2.5 MG/1
20 TABLET ORAL
Qty: 34 TABLET | Refills: 2 | Status: SHIPPED | OUTPATIENT
Start: 2025-03-10

## 2025-03-12 ENCOUNTER — TELEPHONE (OUTPATIENT)
Dept: RHEUMATOLOGY | Facility: CLINIC | Age: 45
End: 2025-03-12
Payer: COMMERCIAL

## 2025-03-12 NOTE — TELEPHONE ENCOUNTER
Left voicemail asking patient to contact the office back to confirmed offered appointment on March 28 @ 10:00.

## 2025-03-13 ENCOUNTER — TELEPHONE (OUTPATIENT)
Dept: RHEUMATOLOGY | Facility: CLINIC | Age: 45
End: 2025-03-13
Payer: COMMERCIAL

## 2025-03-13 NOTE — TELEPHONE ENCOUNTER
Reached out to patient but phone line is disconnected.         ----- Message from Ismael Winter sent at 3/12/2025  4:55 PM CDT -----  Regarding: FW: Scheduling Request  Contact: pt @ [phone] (home)    ----- Message -----  From: Zainab Coon  Sent: 3/12/2025  10:28 AM CDT  To: [facility]ese [facility]CADA Staff  Subject: Scheduling Request                               Scheduling Request Appt Type:  Virtual Appt Date/Time Preference: first available Treating Provider: Jese Caller Name: Carirenetta Adrianne Contact Preference: [phone] (home)  Comments/notes: pt is calling to get a virtual appt. Asking for a call back

## 2025-03-14 ENCOUNTER — PATIENT MESSAGE (OUTPATIENT)
Dept: RHEUMATOLOGY | Facility: CLINIC | Age: 45
End: 2025-03-14
Payer: COMMERCIAL

## 2025-03-21 ENCOUNTER — OFFICE VISIT (OUTPATIENT)
Dept: RHEUMATOLOGY | Facility: CLINIC | Age: 45
End: 2025-03-21
Payer: COMMERCIAL

## 2025-03-21 DIAGNOSIS — Z79.899 HIGH RISK MEDICATION USE: ICD-10-CM

## 2025-03-21 DIAGNOSIS — L40.50 PSORIATIC ARTHRITIS: Primary | ICD-10-CM

## 2025-03-21 RX ORDER — FOLIC ACID 1 MG/1
1 TABLET ORAL DAILY
Qty: 90 TABLET | Refills: 3 | Status: SHIPPED | OUTPATIENT
Start: 2025-03-21 | End: 2026-03-21

## 2025-03-21 RX ORDER — CELECOXIB 200 MG/1
200 CAPSULE ORAL 2 TIMES DAILY PRN
Qty: 60 CAPSULE | Refills: 2 | Status: SHIPPED | OUTPATIENT
Start: 2025-03-21

## 2025-03-21 RX ORDER — ADALIMUMAB-ADAZ 40 MG/.4ML
40 INJECTION, SOLUTION SUBCUTANEOUS
Qty: 2 PEN | Refills: 5 | Status: SHIPPED | OUTPATIENT
Start: 2025-03-21

## 2025-03-21 NOTE — ASSESSMENT & PLAN NOTE
Physical Therapy     Referred by: Yogesh Garza MD; Medical Diagnosis (from order):    Diagnosis Information      Diagnosis    715.15 (ICD-9-CM) - M16.12 (ICD-10-CM) - Primary osteoarthritis of left hip                Daily Treatment Note    Visit:  9     SUBJECTIVE                                                                                                               Was doing good until yesterday and had 15 hours in a chair.  Had to sit for work and has increase low back pain and groin pain   Pain / Symptoms:  Pain rating (out of 10): Current: 0 ; Best: 0; Worst: 4    OBJECTIVE                                                                                                                        TREATMENT                                                                                                                  Therapeutic Exercise:  Review and upgrade home exercise program see below  Upright bike level 2.5 5 minutes  Side lying hip abduction    Leg Press double leg 6 plates 10x2, single leg 3 plates 10x1  Dav hip flexor stretch 30 second hold   Hip flexor stretch off Leg press machine    Manual Therapy:  Left lateral hip scar mobilization   Soft tissue mobilization to piriformis, iliotibial band and quad  Side lying hip flexor stretch    Skilled input: verbal instruction/cues    Writer verbally educated and received verbal consent for hand placement, positioning of patient, and techniques to be performed today from patient for therapist position for techniques as described above and how they are pertinent to the patient's plan of care.    Home Exercise Program/Education Materials: Access Code: [email or web address]  URL: [email or web address]  Date: 01/04/2022  Prepared by: Shena Penn    Exercises  •Standing Hamstring Stretch with Step - 2 x daily - 7 x weekly - 1 sets - 2 reps - 30 hold  •Modified Dav Stretch - 2 x daily - 7 x weekly - 1 sets - 10 reps - . hold  •Prone Alternating  Psoriatic arthritis now more symptomatic in spite of methotrexate plus sulfasalazine and she is having difficulty with activities of daily living , , house chores and has had swelling of R hand and L foot    Will order anti-TNF to replace sulfasalazine and take in combination with methotrexate  History of pos PPD ; never treated as had history BCG vaccine and clear CXR; will get Quantiferon and other pre-DMARD labs  She no longer has medicaid but may be on her 's [employer]    I will sen adalimumab Rx to [facility]     Will need Return To Clinic me in 3 month with 4 labs     Arm and Leg Lifts - 1 x daily - 7 x weekly - 1 sets - 10 reps - . hold  •Clamshell with Resistance - 2 x daily - 7 x weekly - 10 reps - 2 sets  •Sidelying Hip Abduction - 2 x daily - 7 x weekly - 1 sets - 10 reps - . hold  •Supine March - 2 x daily - 7 x weekly - 10 reps - 2 sets           ASSESSMENT                                                                                                             Patient continues to be affected by tight hip flexors bilaterally. Pain levels are low, but increased with prolonged sitting yesterday. Primary impairments include weakness and tightness. Functional limitation include standing or sitting durations as before surgery. Today's session emphasized strength and flexibility.    Pain/symptoms after session (out of 10): 0  Patient Education:   Results of above outlined education: Verbalizes understanding and Demonstrates understanding      PLAN                                                                                                                           Suggestions for next session as indicated: Progress per plan of care, multi hip with light weight         Therapy procedure time and total treatment time can be found documented on the Time Entry flowsheet

## 2025-03-21 NOTE — PROGRESS NOTES
Subjective:       Patient ID: Dede Silver is a 44 y.o. female.    Chief Complaint: Disease Management    HPI  The patient location is: [facility]   The chief complaint leading to consultation is: psoriatic arthritis     Visit type: audiovisual    Face to Face time with patient: 28 min  35 minutes of total time spent on the encounter, which includes face to face time and non-face to face time preparing to see the patient (eg, review of tests), Obtaining and/or reviewing separately obtained history, Documenting clinical information in the electronic or other health record, Independently interpreting results (not separately reported) and communicating results to the patient/family/caregiver, or Care coordination (not separately reported).         Each patient to whom he or she provides medical services by telemedicine is:  (1) informed of the relationship between the physician and patient and the respective role of any other health care provider with respect to management of the patient; and (2) notified that he or she may decline to receive medical services by telemedicine and may withdraw from such care at any time.    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 Right Dorsal Ulnar cutaneous neuropathy   2. Right Sural neuropathy   3. 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      Also saw hematology for kappa light chains 2021; normal CBC and other labs including CARMEN/RF/CCP     No family here    6 mo to 17 y/o     2021 started SSZ; 1500 bid  May 2022 HCQ added; then stopped Oct 2022    Oct 2022 stopped hydroxychloroquine and added methotrexate     PMH  History pos PPD due to vaccine; clear chest    Currently   Sulfasalazine 3 twice daily  Methotrexate 8 tab/week  Celebrex    Today says medicine not working as well  LV June 2024  She reduced Celebrex to once a week but was worse so increased to twice a day  In Dec R foot became very swollen, as did R hand especially index and thumb, mcp swollen; unable to use hand to squeeze; ortho gave shot in hand that helped until last month    Difficulty combing hair  Hard to perform chores with hands  Asking help from friends    Lost Medicaid coverage  's income just exceeded the minimum for Medicaid, but he is covered by employer sponsored [employer]  She may apply for disability; last worked 2019  Having difficulty standing due to gluteus muscle problem      Review of Systems   Constitutional:  Negative for fever and unexpected weight change.   HENT:  Negative for mouth sores and trouble swallowing.    Eyes:  Negative for redness.   Respiratory:  Negative for cough and shortness of breath.    Cardiovascular:  Negative for chest pain.   Gastrointestinal:  Negative for constipation and diarrhea.   Genitourinary:  Negative for dysuria and genital sores.   Skin:  Negative for rash.        Acneiform rash on face  Scalp ok with topical solutions  No other rash at this time   Neurological:  Negative for headaches.   Hematological:  Does not bruise/bleed easily.           3/21/2025    12:35 PM   Rapid3 Question Responses and Scores   MDHAQ Score 0.9   Psychologic Score 1.1   Pain Score 6   When you awakened in the morning OVER THE LAST WEEK, did you feel stiff? Yes   If Yes, please indicate the number of hours until you are as limber as you will be for the day 3   Fatigue Score 7.5   Global Health Score 8   RAPID3 Score 5.67      Objective:   There were no vitals taken for this visit.     Physical Exam      Assessment:       1. Psoriatic arthritis    2.  Class 2 severe obesity due to excess calories with serious comorbidity and body mass index (BMI) of 36.0 to 36.9 in adult    3. High risk medication use            Plan:       Problem List Items Addressed This Visit          Active Problems    Psoriatic arthritis - Primary (Chronic)    Psoriatic arthritis now more symptomatic in spite of methotrexate plus sulfasalazine and she is having difficulty with activities of daily living , , house chores and has had swelling of R hand and L foot    Will order anti-TNF to replace sulfasalazine and take in combination with methotrexate  History of pos PPD ; never treated as had history BCG vaccine and clear CXR; will get Quantiferon and other pre-DMARD labs  She no longer has medicaid but may be on her 's [employer]    I will sen adalimumab Rx to [facility]     Will need Return To Clinic me in 3 month with 4 labs           Relevant Medications    adalimumab-adaz 40 mg/0.4 mL PnIj    celecoxib (CELEBREX) 200 MG capsule    Other Relevant Orders    Sedimentation rate    C-Reactive Protein    High risk medication use    No treatment related adverse effects; will continue to monitor for drug toxicity           Relevant Medications    folic acid (FOLVITE) 1 MG tablet    Other Relevant Orders    HIV 1/2 Ag/Ab (4th Gen)    Hepatitis B surface antigen    HBcAB    Hepatitis B surface antibody    Hepatitis C antibody    Hepatitis A antibody, IgG    Strongyloides IgG Antibodies    Quantiferon Gold TB    Treponema Pallidium Antibodies IgG, IgM    Varicella zoster antibody, IgG    Comprehensive Metabolic Panel    CBC Auto Differential    Class 2 severe obesity due to excess calories with serious comorbidity and body mass index (BMI) of 36.0 to 36.9 in adult

## 2025-03-26 ENCOUNTER — RESULTS FOLLOW-UP (OUTPATIENT)
Dept: RHEUMATOLOGY | Facility: CLINIC | Age: 45
End: 2025-03-26

## 2025-03-26 ENCOUNTER — LAB VISIT (OUTPATIENT)
Dept: LAB | Facility: HOSPITAL | Age: 45
End: 2025-03-26
Attending: INTERNAL MEDICINE
Payer: COMMERCIAL

## 2025-03-26 DIAGNOSIS — L40.50 PSORIATIC ARTHRITIS: ICD-10-CM

## 2025-03-26 DIAGNOSIS — Z79.899 HIGH RISK MEDICATION USE: ICD-10-CM

## 2025-03-26 LAB
ABSOLUTE EOSINOPHIL (OHS): 0.08 K/UL
ABSOLUTE MONOCYTE (OHS): 0.23 K/UL (ref 0.3–1)
ABSOLUTE NEUTROPHIL COUNT (OHS): 2.84 K/UL (ref 1.8–7.7)
ALBUMIN SERPL BCP-MCNC: 4.1 G/DL (ref 3.5–5.2)
ALP SERPL-CCNC: 61 UNIT/L (ref 40–150)
ALT SERPL W/O P-5'-P-CCNC: 27 UNIT/L (ref 10–44)
ANION GAP (OHS): 10 MMOL/L (ref 8–16)
AST SERPL-CCNC: 19 UNIT/L (ref 11–45)
BASOPHILS # BLD AUTO: 0.02 K/UL
BASOPHILS NFR BLD AUTO: 0.4 %
BILIRUB SERPL-MCNC: 0.5 MG/DL (ref 0.1–1)
BUN SERPL-MCNC: 13 MG/DL (ref 6–20)
CALCIUM SERPL-MCNC: 8.7 MG/DL (ref 8.7–10.5)
CHLORIDE SERPL-SCNC: 108 MMOL/L (ref 95–110)
CO2 SERPL-SCNC: 21 MMOL/L (ref 23–29)
CREAT SERPL-MCNC: 0.8 MG/DL (ref 0.5–1.4)
CRP SERPL-MCNC: 4.4 MG/L
ERYTHROCYTE [DISTWIDTH] IN BLOOD BY AUTOMATED COUNT: 15.8 % (ref 11.5–14.5)
ERYTHROCYTE [SEDIMENTATION RATE] IN BLOOD BY PHOTOMETRIC METHOD: 11 MM/HR
GFR SERPLBLD CREATININE-BSD FMLA CKD-EPI: >60 ML/MIN/1.73/M2
GLUCOSE SERPL-MCNC: 110 MG/DL (ref 70–110)
HAV AB SER QL IA: REACTIVE
HBV CORE AB SERPL QL IA: NORMAL
HBV SURFACE AB SER-ACNC: <3 MIU/ML
HBV SURFACE AB SERPL IA-ACNC: NORMAL M[IU]/ML
HBV SURFACE AG SERPL QL IA: NORMAL
HCT VFR BLD AUTO: 31.2 % (ref 37–48.5)
HCV AB SERPL QL IA: NORMAL
HGB BLD-MCNC: 10 GM/DL (ref 12–16)
HIV 1+2 AB+HIV1 P24 AG SERPL QL IA: NORMAL
IMM GRANULOCYTES # BLD AUTO: 0.02 K/UL (ref 0–0.04)
IMM GRANULOCYTES NFR BLD AUTO: 0.4 % (ref 0–0.5)
LYMPHOCYTES # BLD AUTO: 1.45 K/UL (ref 1–4.8)
MCH RBC QN AUTO: 29.5 PG (ref 27–50)
MCHC RBC AUTO-ENTMCNC: 32.1 G/DL (ref 32–36)
MCV RBC AUTO: 92 FL (ref 82–98)
NUCLEATED RBC (/100WBC) (OHS): 0 /100 WBC
PLATELET # BLD AUTO: 223 K/UL (ref 150–450)
PMV BLD AUTO: 9.7 FL (ref 9.2–12.9)
POTASSIUM SERPL-SCNC: 4.1 MMOL/L (ref 3.5–5.1)
PROT SERPL-MCNC: 7.4 GM/DL (ref 6–8.4)
RBC # BLD AUTO: 3.39 M/UL (ref 4–5.4)
RELATIVE EOSINOPHIL (OHS): 1.7 %
RELATIVE LYMPHOCYTE (OHS): 31.3 % (ref 18–48)
RELATIVE MONOCYTE (OHS): 5 % (ref 4–15)
RELATIVE NEUTROPHIL (OHS): 61.2 % (ref 38–73)
SODIUM SERPL-SCNC: 139 MMOL/L (ref 136–145)
T PALLIDUM IGG+IGM SER QL: NEGATIVE
WBC # BLD AUTO: 4.64 K/UL (ref 3.9–12.7)

## 2025-03-26 PROCEDURE — 85025 COMPLETE CBC W/AUTO DIFF WBC: CPT

## 2025-03-26 PROCEDURE — 86706 HEP B SURFACE ANTIBODY: CPT

## 2025-03-26 PROCEDURE — 87340 HEPATITIS B SURFACE AG IA: CPT

## 2025-03-26 PROCEDURE — 86704 HEP B CORE ANTIBODY TOTAL: CPT

## 2025-03-26 PROCEDURE — 86140 C-REACTIVE PROTEIN: CPT

## 2025-03-26 PROCEDURE — 86682 HELMINTH ANTIBODY: CPT

## 2025-03-26 PROCEDURE — 86787 VARICELLA-ZOSTER ANTIBODY: CPT

## 2025-03-26 PROCEDURE — 86480 TB TEST CELL IMMUN MEASURE: CPT

## 2025-03-26 PROCEDURE — 87389 HIV-1 AG W/HIV-1&-2 AB AG IA: CPT

## 2025-03-26 PROCEDURE — 36415 COLL VENOUS BLD VENIPUNCTURE: CPT

## 2025-03-26 PROCEDURE — 85652 RBC SED RATE AUTOMATED: CPT

## 2025-03-26 PROCEDURE — 80053 COMPREHEN METABOLIC PANEL: CPT

## 2025-03-26 PROCEDURE — 86803 HEPATITIS C AB TEST: CPT

## 2025-03-26 PROCEDURE — 86790 VIRUS ANTIBODY NOS: CPT

## 2025-03-26 PROCEDURE — 86593 SYPHILIS TEST NON-TREP QUANT: CPT

## 2025-03-27 ENCOUNTER — RESULTS FOLLOW-UP (OUTPATIENT)
Dept: RHEUMATOLOGY | Facility: CLINIC | Age: 45
End: 2025-03-27

## 2025-03-27 LAB
(RETIRING) TB2 AG MINUS NIL RESULT (OHS): 0.05
GAMMA INTERFERON BACKGROUND BLD IA-ACNC: 0.01 [IU]/ML
M TB IFN-G CD4+ BCKGRND COR BLD-ACNC: 0.02 [IU]/ML
MITOGEN IGNF BCKGRD COR BLD-ACNC: 7.58 [IU]/ML
QUANTIFERON GOLD TB INTERP: NEGATIVE
V.ZOSTER IGG INTERP (OHS): POSITIVE
VARICELLA ZOSTER IGG (OHS): 4.97 S/CO

## 2025-03-31 LAB — STRONGYLOIDES IGG SER QL IA: NEGATIVE

## 2025-04-02 ENCOUNTER — PATIENT MESSAGE (OUTPATIENT)
Dept: RHEUMATOLOGY | Facility: CLINIC | Age: 45
End: 2025-04-02
Payer: COMMERCIAL

## 2025-04-28 DIAGNOSIS — Z79.1 NSAID LONG-TERM USE: ICD-10-CM

## 2025-04-28 DIAGNOSIS — Z79.899 HIGH RISK MEDICATION USE: ICD-10-CM

## 2025-04-28 RX ORDER — PANTOPRAZOLE SODIUM 40 MG/1
40 TABLET, DELAYED RELEASE ORAL DAILY
Qty: 30 TABLET | Refills: 11 | Status: SHIPPED | OUTPATIENT
Start: 2025-04-28 | End: 2025-05-02 | Stop reason: SDUPTHER

## 2025-05-02 ENCOUNTER — PATIENT MESSAGE (OUTPATIENT)
Dept: RHEUMATOLOGY | Facility: CLINIC | Age: 45
End: 2025-05-02
Payer: COMMERCIAL

## 2025-05-02 DIAGNOSIS — Z79.1 NSAID LONG-TERM USE: ICD-10-CM

## 2025-05-02 DIAGNOSIS — L40.9 PSORIASIS OF SCALP: ICD-10-CM

## 2025-05-02 DIAGNOSIS — L40.50 PSORIATIC ARTHRITIS: ICD-10-CM

## 2025-05-02 DIAGNOSIS — Z79.899 HIGH RISK MEDICATION USE: ICD-10-CM

## 2025-05-02 RX ORDER — PANTOPRAZOLE SODIUM 40 MG/1
40 TABLET, DELAYED RELEASE ORAL DAILY
Qty: 30 TABLET | Refills: 11 | Status: SHIPPED | OUTPATIENT
Start: 2025-05-02 | End: 2026-05-02

## 2025-05-02 RX ORDER — METHOTREXATE 2.5 MG/1
20 TABLET ORAL
Qty: 34 TABLET | Refills: 2 | Status: SHIPPED | OUTPATIENT
Start: 2025-05-02

## 2025-05-02 RX ORDER — CELECOXIB 200 MG/1
200 CAPSULE ORAL 2 TIMES DAILY PRN
Qty: 60 CAPSULE | Refills: 2 | Status: SHIPPED | OUTPATIENT
Start: 2025-05-02

## 2025-05-02 NOTE — TELEPHONE ENCOUNTER
[place] and is requesting a refill for methotrexate, celebrex, and protonix.  Last office visit on 3/21/25 and labs done on 3/26/25.  Order pended.

## 2025-05-06 ENCOUNTER — TELEPHONE (OUTPATIENT)
Dept: SPORTS MEDICINE | Facility: CLINIC | Age: 45
End: 2025-05-06
Payer: COMMERCIAL

## 2025-05-06 DIAGNOSIS — M25.551 RIGHT HIP PAIN: Primary | ICD-10-CM

## 2025-05-06 NOTE — TELEPHONE ENCOUNTER
Called pt to determine laterality of thigh pain, LVM. Sent PM.    Eve Stover, MS, OTC  Clinical Assistant to Dr. Isidra Alfaro

## 2025-05-07 ENCOUNTER — HOSPITAL ENCOUNTER (OUTPATIENT)
Dept: RADIOLOGY | Facility: HOSPITAL | Age: 45
Discharge: HOME | End: 2025-05-07
Attending: NEUROMUSCULOSKELETAL MEDICINE & OMM
Payer: COMMERCIAL

## 2025-05-07 ENCOUNTER — OFFICE VISIT (OUTPATIENT)
Dept: SPORTS MEDICINE | Facility: CLINIC | Age: 45
End: 2025-05-07
Payer: COMMERCIAL

## 2025-05-07 ENCOUNTER — PATIENT MESSAGE (OUTPATIENT)
Dept: SPORTS MEDICINE | Facility: CLINIC | Age: 45
End: 2025-05-07

## 2025-05-07 VITALS — SYSTOLIC BLOOD PRESSURE: 131 MMHG | DIASTOLIC BLOOD PRESSURE: 86 MMHG

## 2025-05-07 DIAGNOSIS — M76.71 PERONEAL TENDONITIS OF RIGHT LOWER EXTREMITY: ICD-10-CM

## 2025-05-07 DIAGNOSIS — M25.571 RIGHT ANKLE PAIN, UNSPECIFIED CHRONICITY: ICD-10-CM

## 2025-05-07 DIAGNOSIS — M25.551 RIGHT HIP PAIN: ICD-10-CM

## 2025-05-07 DIAGNOSIS — L40.50 PSORIATIC ARTHRITIS: Chronic | ICD-10-CM

## 2025-05-07 DIAGNOSIS — M25.571 RIGHT ANKLE PAIN, UNSPECIFIED CHRONICITY: Primary | ICD-10-CM

## 2025-05-07 DIAGNOSIS — M25.571 PAIN IN JOINT INVOLVING RIGHT ANKLE AND FOOT: ICD-10-CM

## 2025-05-07 DIAGNOSIS — S76.011D TEAR OF RIGHT GLUTEUS MINIMUS TENDON, SUBSEQUENT ENCOUNTER: ICD-10-CM

## 2025-05-07 DIAGNOSIS — S93.491A SPRAIN OF ANTERIOR TALOFIBULAR LIGAMENT OF RIGHT ANKLE, INITIAL ENCOUNTER: ICD-10-CM

## 2025-05-07 PROCEDURE — 73502 X-RAY EXAM HIP UNI 2-3 VIEWS: CPT | Mod: TC,PO,RT

## 2025-05-07 PROCEDURE — 3075F SYST BP GE 130 - 139MM HG: CPT | Mod: CPTII,S$GLB,, | Performed by: NEUROMUSCULOSKELETAL MEDICINE & OMM

## 2025-05-07 PROCEDURE — 99215 OFFICE O/P EST HI 40 MIN: CPT | Mod: S$GLB,,, | Performed by: NEUROMUSCULOSKELETAL MEDICINE & OMM

## 2025-05-07 PROCEDURE — 73502 X-RAY EXAM HIP UNI 2-3 VIEWS: CPT | Mod: 26,RT,, | Performed by: RADIOLOGY

## 2025-05-07 PROCEDURE — 73610 X-RAY EXAM OF ANKLE: CPT | Mod: TC,PO,RT

## 2025-05-07 PROCEDURE — 73610 X-RAY EXAM OF ANKLE: CPT | Mod: 26,RT,, | Performed by: RADIOLOGY

## 2025-05-07 PROCEDURE — 1160F RVW MEDS BY RX/DR IN RCRD: CPT | Mod: CPTII,S$GLB,, | Performed by: NEUROMUSCULOSKELETAL MEDICINE & OMM

## 2025-05-07 PROCEDURE — 3079F DIAST BP 80-89 MM HG: CPT | Mod: CPTII,S$GLB,, | Performed by: NEUROMUSCULOSKELETAL MEDICINE & OMM

## 2025-05-07 PROCEDURE — 99999 PR PBB SHADOW E&M-EST. PATIENT-LVL IV: CPT | Mod: PBBFAC,,, | Performed by: NEUROMUSCULOSKELETAL MEDICINE & OMM

## 2025-05-07 PROCEDURE — 1159F MED LIST DOCD IN RCRD: CPT | Mod: CPTII,S$GLB,, | Performed by: NEUROMUSCULOSKELETAL MEDICINE & OMM

## 2025-05-07 NOTE — PROGRESS NOTES
Subjective:     Dede Silver     Chief Complaint   Patient presents with    Right Hip - Pain       HPI    Dede is a 44 y.o. female coming in today for right hip pain that began 3 month(s) ago. Pt. describes the pain as a 5/10 heaviness pain that does radiate. Pain is 7/10 at its worst. There was not a fall/injury/ or trauma associated with the onset of symptoms. The pain is better with rest, heat, and prior PT and worse with increased activity.  He has also recently started on Humira for her psoriatic arthritis with rheumatology 4 weeks ago.  Patient also notes acute right ankle pain, feeling a pop after getting up from sitting on the floor with her child, noting pain and swelling of the outside ankle and difficulty walking on her ankle.      PAST MEDICAL HISTORY:   Past Medical History:   Diagnosis Date    Arthritis     Fibromyalgia     Peripheral neuropathy 10/1/2021     PAST SURGICAL HISTORY: History reviewed. No pertinent surgical history.  FAMILY HISTORY:   Family History   Problem Relation Name Age of Onset    Melanoma Neg Hx       SOCIAL HISTORY: Social History[1]    MEDICATIONS: Current Medications[2]  ALLERGIES: Review of patient's allergies indicates:  No Known Allergies    Objective:     VITAL SIGNS: /86 (Patient Position: Sitting)   Pulse (P) 72    General    Vitals reviewed.  Constitutional: She is oriented to person, place, and time. She appears well-developed and well-nourished.   Neurological: She is alert and oriented to person, place, and time.   Psychiatric: She has a normal mood and affect. Her behavior is normal.               MUSCULOSKELETAL EXAM  HIP: right HIP  The affected hip is compared to the contralateral hip.    Observation:    There is no edema, erythema, or ecchymosis in the lumbosacral region.   There is no Trendelenburg sign on either side  No obvious pelvic obliquity while standing.    No thoracolumbar scoliosis observed.    No midline skin abnormalities.  No atrophy  noted in the lower limbs.  Gait: Right antalgic with Neutral ankle mechanics and Neutral medial arch    ROM (* = with pain):  Passive hip flexion to 120° on left and 120° on right*  Passive hip internal rotation to 45° on left and 45° on right*  Passive hip external rotation to 45° on left and 45° on right*  Passive hip abduction to 45° on left and 45° on right  All motions above are without pain.    Tenderness To Palpation:  No tenderness at the ASIS, AIIS, PSIS, PIIS, iliac crest, pubic bones, ischial tuberosity.  No tenderness throughout the lumbar spine, iliolumbar region, or posterior pelvis.  No tenderness throughout the sacrum or piriformis  No tenderness over the greater trochanteric bursa or greater/lesser trochanters.  + tenderness at the glut attachments on the greater trochanter, most notably the anterior aspect of the greater trochanter  No tenderness over proximal IT band or hip flexor musculature.    Strength Testing (* = with pain):  Hip flexion - 5/5 on left and 5/5 on right  Hip extension - 5/5 on left and 5/5 on right  Hip adduction - 5/5 on left and 5/5 on right  Hip abduction - 5/5 on left and 5/5 on right  Knee flexion - 5/5 on left and 5/5 on right  Knee extension - 5/5 on left and 5/5 on right  Glutaeus medius - 5-/5 on left and 4+/5 on right    Special Tests:  Standing Trendelenburg test - negative    Seated straight leg raise - negative  Supine straight leg raise - negative  Hamstring flexibility symmetric    Log roll - negative  DEIRDRE - positive  FADIR - positive  Scour test - positive  No pain with posterior hip capsule compression    ASIS compression test - negative  SI drawer test - negative   Thigh thrust test - negative     Piriformis test (Bonnet's) - negative  Ely's test - negative  Quadriceps flexibility symmetric.  Vikash test - negative  Mateo compression test - negative    Fulcrum test - negative    Neurovascular Exam:  2+ femoral, DP, and PT pulses BL.  No skin changes, no  abnormal hair distribution.  Sensation intact to light touch throughout the obturator and medial/lateral/posterior femoral cutaneous nerves.  2+/4 reflexes at L4 and S1 dermatomes  Capillary refill intact to <2 seconds in all lower limb digits.    ANKLE: right ANKLE  The affected ankle is compared to the contralateral ankle.    Observation:    + right lateral ankle edema  There is no erythema, or ecchymosis.   Shoes reveal a normal wear pattern.  No structural deformities including pes planus/cavus, hallux valgus, or toe deformities.  Negative too-many toes sign.    Normal callus pattern on the feet bilaterally.    Achilles tendon and calcaneal insertion reveals no deformities  No leg or intrinsic foot muscle atrophy.  + R antalgic gait    ROM (* = with pain):  Active dorsiflexion to 20° on left and 20° on right  Active plantarflexion to 50° on left and 50° on right    Active ankle inversion to 35° on left and 35° on right  Active ankle eversion to 15° on left and 10° on right*  Full active flexion/extension of the toes bilaterally.   Heel cords without tightness bilaterally.    Tenderness To Palpation:  + tenderness at the ATFL  No tenderness at the CFL, PTFL, or deltoid ligaments  No tenderness over the distal anterior syndesmosis, distal tibia/fibula, fibular head/shaft  No tenderness at medial or lateral malleoli   No tenderness at navicular, cuboid, cuneiforms, talus, or calcaneous  No tenderness along the metatarsals or phalanges  No tenderness at the Achilles tendon calcaneal insertion  No tenderness at the posterior tibial   + tenderness at the peroneal tendons    Strength Testing (* = with pain):  Dorsiflexion - 5/5 on left and 5/5 on right  Platarflexion - 5/5 on left and 5/5 on right  Resisted Inversion - 5/5 on left and 5/5 on right  Resisted Eversion - 5/5 on left and 5-/5 on right  Great Toe Extension - 5/5 on left and 5/5 on right  Great Toe Flexion - 5/5 on left and 5/5 on right  + weakness with  coupled resisted ankle eversion and plantar flexion    Special Tests:  Anterior talar drawer - negative and without dimpling, + pain  Talar tilt - negative  Reverse Talar tilt - negative    Heel tap test - negative  Distal tib/fib squeeze test - positive  External rotation stress (Kleiger) test - negative  Early squeeze test - negative    Metatarsal squeeze test - negative  Midfoot stress test - negative  Calcaneal squeeze test - negative    No subluxation of the peroneal tendons with resisted eversion.    Vascular/Sensory Exam:  DP and PT pulses intact bilaterally  No skin changes, no abnormal hair distribution  Sensation intact to light touch throughout the saphenous, sural, superficial peroneal, deep peroneal, and tibial nerve distributions  Negative Tinel's test over tarsal tunnel  2+/4 reflexes at L4 and S1 dermatomes  Capillary refill intact <2 seconds in all toes bilaterally.    IMAGIN. X-ray ordered due to right hip pain. (AP pelvis and frogleg lateral  right views) taken today.   2. X-ray images were reviewed personally by me and then directly with patient.  3. FINDINGS: X-ray images obtained demonstrate that the bones of the hips and pelvis are intact. No bony destruction is seen. Intrauterine contraceptive device is noted in place   4. IMPRESSION: No pathology or irregularities appreciated.       IMAGIN. X-ray ordered due to right ankle. (AP, lateral, and oblique views) taken today.   2. X-ray images were reviewed personally by me and then directly with patient.  3. FINDINGS: X-ray images obtained demonstrate that the ankle mortise is preserved. Bony structures are intact. No significant abnormalities are noted.   4. IMPRESSION: No pathology or irregularities appreciated.       Assessment:      Encounter Diagnoses   Name Primary?    acute right ankle pain Yes    Pain in joint involving right ankle and foot     Sprain of anterior talofibular ligament of right ankle, initial encounter      Peroneal tendonitis of right lower extremity     Tear of right gluteus minimus tendon, subsequent encounter     Right hip pain     Psoriatic arthritis           Plan:   1. Chronic right hip pain likely secondary to underlying right glutaeus minimus partial tearing and chronic anterior superior labral tear, as noted on previous right hip MRI.  Over pain complicated by underlying psoriatic arthritis as well.  Pain improved with formal physical therapy in the past.  - Referral to outpatient PT (external referral) for increase pelvic stability, eccentric exercise program, and neuromuscular retraining.  Patient plans to proceed with this while she is away visiting family in [place] over the next 2 months.  - also discussed with patient option of ultrasound-guided platelet rich plasma injection for her underlying gluteus minimus partial tearing.  Patient is going to look into this procedure in [place] as well, as it is more affordable there.  - continue follow-up with Rheumatology, as planned  - continue Celebrex 200 mg p.o. b.i.d. with food needed for pain control.  -  MRI images of right hip taken 8/19/23 showed No evidence for AVN of the hip or occult fracture. Partial-thickness tear RIGHT gluteus minimus insertion upon the anterior facet greater trochanter without retraction.  Associated adjacent bone marrow edema.  Chronic anterior superior labral tear.  No significant intra-articular hip DJD changes.  -  X-ray images of right hip taken today (AP pelvis and frogleg lateral  right views) showed no abnormalities. Images were personally reviewed with patient.    2. Acute right ankle pain likely secondary to lateral ankle sprain with associated peroneal tendinitis, however concern for a possible underlying partial peroneal tendon tear, given significant weakness on exam today.  Right ankle MRI ordered for further evaluation  - continue Celebrex 200 mg p.o. b.i.d. with food needed for pain control as well as Ice up to 20  minutes at a time  - DME order placed for walking boot for increased support of right ankle wear for the next 1-2 weeks, doing ankle dorsiflexion and plantar flexion exercises 3 times a day to prevent ankle stiffness.  Order also given for knee scooter for ease of transportation, especially with her upcoming trip to [place].  - Referral to outpatient PT (external referral) for increase ankle stability, ankle proprioception, and peroneal tendon strengthening.  Patient plans to proceed with this while she is away visiting family in [place] over the next 2 months.  -  X-ray images of right ankle taken today (AP, lateral, and oblique views) showed no acute abnormalities. Images were personally reviewed with patient.    3. Follow-up via virtual visit to review upcoming right ankle MRI with plans for follow-up in both the right hip and right ankle upon return    4. Patient agreeable to today's plan and all questions were answered    This note is dictated using the M*Modal Fluency Direct word recognition program. There are word recognition mistakes that are occasionally missed on review.    Total time spent face-to face with patient counseling or coordinating care including prognosis, differential diagnosis, risks and benefits of treatment, instructions, compliance risk reductions as well as non-face-to-face time personally spent reviewing medial record, medical documentation, and coordination of care.     EST      MINUTES X   36336 10-19    98282 20-29    33137 30-39    72234 40-54 x   NEW     57231 15-29    42174 30-44    59209 45-59    [id number] 60-74    AUDIO ONLY (MDM)    EST     83333 11-20 (straightforward)    86736 20-30(Low)    67828 30-40(moderate)    30193 40+ (high)    NEW     28689 15-30  (straightforward)    42692 30-45 (Low)    29683 45-60(moderate)    07245 60+ (high)                   [1]   Social History  Socioeconomic History    Marital status:    Tobacco Use    Smoking status: Never    Smokeless tobacco:  "Never   Substance and Sexual Activity    Alcohol use: No     Social Drivers of Health     Financial Resource Strain: Low Risk  (3/21/2025)    Overall Financial Resource Strain (CARDIA)     Difficulty of Paying Living Expenses: Not very hard   Food Insecurity: No Food Insecurity (3/21/2025)    Hunger Vital Sign     Worried About Running Out of Food in the Last Year: Never true     Ran Out of Food in the Last Year: Never true   Transportation Needs: No Transportation Needs (3/21/2025)    PRAPARE - Transportation     Lack of Transportation (Medical): No     Lack of Transportation (Non-Medical): No   Physical Activity: Inactive (3/21/2025)    Exercise Vital Sign     Days of Exercise per Week: 0 days     Minutes of Exercise per Session: 20 min   Stress: No Stress Concern Present (3/21/2025)    [employer] - Occupational Stress Questionnaire     Feeling of Stress : Only a little   Housing Stability: Low Risk  (3/21/2025)    Housing Stability Vital Sign     Unable to Pay for Housing in the Last Year: No     Number of Times Moved in the Last Year: 0     Homeless in the Last Year: No   [2]   Current Outpatient Medications:     adalimumab-adaz 40 mg/0.4 mL PnIj, Inject 0.4 mLs (40 mg total) into the skin every 14 (fourteen) days., Disp: 2 pen , Rfl: 5    celecoxib (CELEBREX) 200 MG capsule, Take 1 capsule (200 mg total) by mouth 2 (two) times daily as needed (arthritis)., Disp: 60 capsule, Rfl: 2    clobetasoL (TEMOVATE) 0.05 % external solution, Apply topically 2 (two) times daily., Disp: 50 mL, Rfl: 3    folic acid (FOLVITE) 1 MG tablet, Take 1 tablet (1 mg total) by mouth once daily., Disp: 90 tablet, Rfl: 3    insulin syringe-needle U-100 (BD INSULIN SYRINGE) 1 mL 25 gauge x 5/8" Syrg, 0.6 mLs by Misc.(Non-Drug; Combo Route) route once a week. (Patient not taking: Reported on 4/8/2025), Disp: 12 each, Rfl: 3    ketoconazole (NIZORAL) 2 % shampoo, Apply topically twice a week. To scalp x 5 " minutes, then rinse, Disp: 120 mL, Rfl: 5    methotrexate 2.5 MG Tab, Take 8 tablets (20 mg total) by mouth every 7 days., Disp: 34 tablet, Rfl: 2    pantoprazole (PROTONIX) 40 MG tablet, Take 1 tablet (40 mg total) by mouth once daily., Disp: 30 tablet, Rfl: 11

## 2025-05-16 ENCOUNTER — TELEPHONE (OUTPATIENT)
Dept: SPORTS MEDICINE | Facility: CLINIC | Age: 45
End: 2025-05-16
Payer: COMMERCIAL

## 2025-05-16 NOTE — TELEPHONE ENCOUNTER
Attempted to contact pt in regards to virtual appt with Dr. Alfaro on 5/19. Informed pt that Dr. Alfaro would need her external imaging so the appointment would have to be in person, earlier that morning. Let the pt know if this works or if they'd like to r/s to a later date to give us a call back and let us know. [phone].

## 2025-08-12 DIAGNOSIS — L40.50 PSORIATIC ARTHRITIS: ICD-10-CM

## 2025-08-12 DIAGNOSIS — L40.9 PSORIASIS OF SCALP: ICD-10-CM

## 2025-08-12 DIAGNOSIS — L40.9 SCALP PSORIASIS: ICD-10-CM

## 2025-08-12 RX ORDER — CELECOXIB 200 MG/1
200 CAPSULE ORAL 2 TIMES DAILY PRN
Qty: 60 CAPSULE | Refills: 2 | Status: SHIPPED | OUTPATIENT
Start: 2025-08-12

## 2025-08-12 RX ORDER — KETOCONAZOLE 20 MG/ML
SHAMPOO, SUSPENSION TOPICAL
Qty: 120 ML | Refills: 5 | OUTPATIENT
Start: 2025-08-14

## 2025-08-12 RX ORDER — METHOTREXATE 2.5 MG/1
20 TABLET ORAL
Qty: 34 TABLET | Refills: 0 | Status: SHIPPED | OUTPATIENT
Start: 2025-08-12